# Patient Record
Sex: FEMALE | Race: WHITE | HISPANIC OR LATINO | Employment: UNEMPLOYED | URBAN - METROPOLITAN AREA
[De-identification: names, ages, dates, MRNs, and addresses within clinical notes are randomized per-mention and may not be internally consistent; named-entity substitution may affect disease eponyms.]

---

## 2018-10-29 ENCOUNTER — NON-PROVIDER VISIT (OUTPATIENT)
Dept: OBGYN | Facility: CLINIC | Age: 28
End: 2018-10-29
Payer: MEDICAID

## 2018-10-29 DIAGNOSIS — Z32.01 PREGNANCY EXAMINATION OR TEST, POSITIVE RESULT: ICD-10-CM

## 2018-10-29 LAB
INT CON NEG: NEGATIVE
INT CON POS: POSITIVE
POC URINE PREGNANCY TEST: POSITIVE

## 2018-10-29 PROCEDURE — 81025 URINE PREGNANCY TEST: CPT | Performed by: OBSTETRICS & GYNECOLOGY

## 2018-12-19 ENCOUNTER — INITIAL PRENATAL (OUTPATIENT)
Dept: OBGYN | Facility: CLINIC | Age: 28
End: 2018-12-19
Payer: MEDICAID

## 2018-12-19 ENCOUNTER — HOSPITAL ENCOUNTER (OUTPATIENT)
Facility: MEDICAL CENTER | Age: 28
End: 2018-12-19
Attending: NURSE PRACTITIONER
Payer: COMMERCIAL

## 2018-12-19 VITALS — WEIGHT: 164 LBS | DIASTOLIC BLOOD PRESSURE: 66 MMHG | SYSTOLIC BLOOD PRESSURE: 108 MMHG | BODY MASS INDEX: 30 KG/M2

## 2018-12-19 DIAGNOSIS — O09.299 HISTORY OF MACROSOMIA IN INFANT IN PRIOR PREGNANCY, CURRENTLY PREGNANT: ICD-10-CM

## 2018-12-19 DIAGNOSIS — Z34.80 SUPERVISION OF OTHER NORMAL PREGNANCY, ANTEPARTUM: ICD-10-CM

## 2018-12-19 DIAGNOSIS — Z98.891 HISTORY OF CESAREAN DELIVERY: ICD-10-CM

## 2018-12-19 DIAGNOSIS — Z34.80 SUPERVISION OF OTHER NORMAL PREGNANCY, ANTEPARTUM: Primary | ICD-10-CM

## 2018-12-19 LAB
APPEARANCE UR: CLEAR
BILIRUB UR STRIP-MCNC: NORMAL MG/DL
COLOR UR AUTO: YELLOW
GLUCOSE UR STRIP.AUTO-MCNC: NEGATIVE MG/DL
KETONES UR STRIP.AUTO-MCNC: NEGATIVE MG/DL
LEUKOCYTE ESTERASE UR QL STRIP.AUTO: NORMAL
NITRITE UR QL STRIP.AUTO: NEGATIVE
PH UR STRIP.AUTO: 7.5 [PH] (ref 5–8)
PROT UR QL STRIP: NORMAL MG/DL
RBC UR QL AUTO: NEGATIVE
SP GR UR STRIP.AUTO: 1.02
UROBILINOGEN UR STRIP-MCNC: NORMAL MG/DL

## 2018-12-19 PROCEDURE — 81002 URINALYSIS NONAUTO W/O SCOPE: CPT | Performed by: NURSE PRACTITIONER

## 2018-12-19 PROCEDURE — 59402 PR NEW OB HIGH RISK: CPT | Performed by: NURSE PRACTITIONER

## 2018-12-19 NOTE — LETTER
Cystic Fibrosis Carrier Testing  Sue Baig    The following information is about a blood test that can be done to determine if you and/or your partner carry the gene for cystic fibrosis.    WHAT IS CYSTIC FIBROSIS?  · Cystic fibrosis (CF) is an inherited disease that affects more than 25,000 American children and young adults.  · Symptoms of CF vary but include lung congestion, pneumonia, diarrhea and poor growth.  Most people with CF have severe medical problems and some die at a young age.  Others have so few symptoms they are unaware they have CF.  · CF does not affect intelligence.  · Although there is no cure for CF at this time, scientists are making progress in improving treatment and in searching for a cure.  In the past many people with CF  at a very young age.  Today, many are living into their 20’s and 30’s.    IS THERE A CHANCE MY BABY COULD HAVE CYSTIC FIBROSIS?  · You can have a child with CF even if there is no history in your family (see chart below).  · CF testing can help determine if you are a carrier and at risk to have a child with CF.  Note: if both parents are carriers, there is a 1 in 4 (25%) chance with each pregnancy that they will have a child with CF.  · Carriers have one normal CF gene and one altered CF gene.  · People with CF have two altered CF genes.  · Most people have two normal copies of the CF gene.    Approximate risk that a couple with no family history of cystic fibrosis will have a child with cystic fibrosis:    Ethnic background / Risk     couple:  1 in 2,500   couple:  1 in 15,000            couple:  1 in 8,000     American couple:  1 in 32,000     WHAT TESTING IS AVAILABLE?  · There is a blood test that can be done to find out if you or your partner is a carrier.  · It is important to understand that CF carrier testing does not detect all CF carriers.  · If the test shows that you are both CF carriers, you unborn  baby can be tested to find out if the baby has CF.    HOW MUCH DOES IT COST TO HAVE CYSTIC FIBROSIS CARRIER TESTING?  · Cost and insurance coverage for CF carrier testing vary depending upon the laboratory used and your insurance policy.  · The average cost for CF carrier testing is $300 per person.  · Your genetic counselor can provide you with more information about cystic fibrosis carrier testing.    _____  Yes, I am interested in discussing carrier testing with a genetic counselor.    _____  No, I am not interested in CF carrier testing or in receiving more information about CF carrier testing.      Client signature: ________________________________________  12/19/2018

## 2018-12-19 NOTE — PROGRESS NOTES
Pt. Here for NOB visit today.  #  First prenatal care  Pt. states  Pharmacy verified.   Chaperone offered and provided  ? Flu vaccine  ? AFP

## 2018-12-19 NOTE — PROGRESS NOTES
S:  Sue Baig is a 28 y.o.  who presents for her new OB exam.  She is 17w6d with and SUSAN of Estimated Date of Delivery: 19 by sure LMP. She has no complaints.  She is currently not working outside the home. No heavy lifting or chemical exposure. No ER visits or previous care in this pregnancy. She has had one VAVD and one c/s for FTP with macrosomia.     desires AFP.  declines CF.  Denies VB, LOF, or cramping.  Denies dysuria, vaginal DC. Reports feeling fetal movement.     Pt is  and lives with FOB.  Pregnancy is desired.      History reviewed. No pertinent past medical history.  History reviewed. No pertinent family history.  Social History     Social History   • Marital status: Single     Spouse name: N/A   • Number of children: N/A   • Years of education: N/A     Occupational History   • Not on file.     Social History Main Topics   • Smoking status: Never Smoker   • Smokeless tobacco: Never Used   • Alcohol use No   • Drug use: No   • Sexual activity: No      Comment: None     Other Topics Concern   • Not on file     Social History Narrative   • No narrative on file     OB History    Para Term  AB Living   4 2 2   1 2   SAB TAB Ectopic Molar Multiple Live Births   1         2      # Outcome Date GA Lbr Nacho/2nd Weight Sex Delivery Anes PTL Lv   4 Current            3 Term 12/05/15 39w5d  4.111 kg (9 lb 1 oz) M CS-Unspec Spinal N ISAIAH   2 SAB  10w1d    SAB         Complications: Molar pregnancy      Birth Comments: Molar pregnancy with D&C   1 Term 07 39w0d  3.572 kg (7 lb 14 oz) F Vag-Vacuum EPI N ISAIAH      Birth Comments: Pt. states no complications.           History of HSV I or II in self or partner: no  History of Thyroid problems: no    O:    Vitals:    18 1338   BP: 108/66   Weight: 74.4 kg (164 lb)      Normal prenatal physical on flow sheet prenatal physical section  Wet mount: none      A:   1.  IUP @ 17w6d per lmp        2.  S=D         3.  See problem list below        4.  History of macrosomia       Patient Active Problem List    Diagnosis Date Noted   • History of  delivery 2018   • History of macrosomia in infant in prior pregnancy, currently pregnant 2018   • History of molar pregnancy in first trimester, antepartum 2015         P:  1.  GC/CT & pap done        2.  Prenatal labs ordered - lab slip given including early glucose and AFP        3.  Discussed PNV, diet, avoidances and adequate water intake        4.  NOB packet given        5.  Return to office in 4 wks        6.  Complete OB US in 2 wks        7.  Patient desires , consent signed. Op report on file.  policy of the clinic reviewed with patient.     No orders of the defined types were placed in this encounter.

## 2018-12-19 NOTE — PROGRESS NOTES
NOB today  LMP: 08/16/2018  Patient is taking PNV  Last pap: 3 years ago, WNL no hx of abnormal paps per patient   Chaperone offered and provided.  Phone # [phone]  Pharmacy confirmed  C/o: None   Flu vaccine offered and declined.

## 2018-12-20 LAB
C TRACH DNA GENITAL QL NAA+PROBE: NEGATIVE
CYTOLOGY REG CYTOL: NORMAL
N GONORRHOEA DNA GENITAL QL NAA+PROBE: NEGATIVE
SPECIMEN SOURCE: NORMAL

## 2018-12-21 ENCOUNTER — APPOINTMENT (OUTPATIENT)
Dept: OBGYN | Facility: CLINIC | Age: 28
End: 2018-12-21

## 2019-01-09 ENCOUNTER — APPOINTMENT (OUTPATIENT)
Dept: RADIOLOGY | Facility: IMAGING CENTER | Age: 29
End: 2019-01-09
Attending: NURSE PRACTITIONER

## 2019-01-09 ENCOUNTER — DATING (OUTPATIENT)
Dept: OBGYN | Facility: CLINIC | Age: 29
End: 2019-01-09

## 2019-01-09 DIAGNOSIS — Z34.80 SUPERVISION OF OTHER NORMAL PREGNANCY, ANTEPARTUM: ICD-10-CM

## 2019-01-09 PROCEDURE — 76805 OB US >/= 14 WKS SNGL FETUS: CPT | Performed by: OBSTETRICS & GYNECOLOGY

## 2019-01-16 ENCOUNTER — ROUTINE PRENATAL (OUTPATIENT)
Dept: OBGYN | Facility: CLINIC | Age: 29
End: 2019-01-16

## 2019-01-16 VITALS — SYSTOLIC BLOOD PRESSURE: 110 MMHG | DIASTOLIC BLOOD PRESSURE: 54 MMHG | WEIGHT: 167 LBS | BODY MASS INDEX: 30.54 KG/M2

## 2019-01-16 DIAGNOSIS — Z3A.21 21 WEEKS GESTATION OF PREGNANCY: ICD-10-CM

## 2019-01-16 PROCEDURE — 90040 PR PRENATAL FOLLOW UP: CPT | Performed by: NURSE PRACTITIONER

## 2019-01-16 PROCEDURE — 90686 IIV4 VACC NO PRSV 0.5 ML IM: CPT | Performed by: NURSE PRACTITIONER

## 2019-01-16 PROCEDURE — 90471 IMMUNIZATION ADMIN: CPT | Performed by: NURSE PRACTITIONER

## 2019-01-16 NOTE — PROGRESS NOTES
Pt here today for OB follow up  Reports +FM  WT: 167 lb  BP: 110/54  Had US on 01/09/2019  Pt states no complaints concerns today  PNP, AFP and early 1 hr gtt not done yet. Pt states she will get blood work done tomorrow. Lab slips reprinted for patient and instructions given.  Desires the influenza vaccine  Good # [phone]    Influenza vaccine given. Left Deltoid. VIS given and screening check list reviewed with pt.  Influenza vaccine verified by Elizabet [place] ([place])

## 2019-01-16 NOTE — PROGRESS NOTES
SUBJECTIVE:  Pt is a 28 y.o.   at 21w6d  gestation. Presents today for follow-up prenatal care. Reports no issues at this time.  Reports fetal movement. Denies cramping/contractions, bleeding or leaking of fluid. Denies dysuria, headaches, N/V, or other issues at this time. Generally feels well today.     OBJECTIVE:  - See prenatal vitals flow  -   Vitals:    19 1440   BP: 110/54   Weight: 75.8 kg (167 lb)                 ASSESSMENT:   - IUP at 21w6d    - S=D   -   Patient Active Problem List    Diagnosis Date Noted   • History of  delivery 2018   • History of macrosomia in infant in prior pregnancy, currently pregnant 2018   • History of molar pregnancy in first trimester, antepartum 2015         PLAN:  - S/sx pregnancy and labor warning signs vs general discomforts discussed  - Fetal movements and kick counts reviewed   - Adequate hydration reinforced  - Nutrition/exercise/vitamin education; continued PNV  - Encouraged tour of LnD/childbirth education classes: contact info provided   - Reports will get labs done tomorrow   - S/p Flu vacc today  - Anticipatory guidance given  - RTC in 4 weeks for follow-up prenatal care

## 2019-02-06 ENCOUNTER — HOSPITAL ENCOUNTER (OUTPATIENT)
Dept: LAB | Facility: MEDICAL CENTER | Age: 29
End: 2019-02-06
Attending: NURSE PRACTITIONER
Payer: MEDICAID

## 2019-02-06 DIAGNOSIS — Z34.80 SUPERVISION OF OTHER NORMAL PREGNANCY, ANTEPARTUM: ICD-10-CM

## 2019-02-06 DIAGNOSIS — O09.299 HISTORY OF MACROSOMIA IN INFANT IN PRIOR PREGNANCY, CURRENTLY PREGNANT: ICD-10-CM

## 2019-02-06 LAB
ABO GROUP BLD: NORMAL
BACTERIA #/AREA URNS HPF: ABNORMAL /HPF
BLD GP AB SCN SERPL QL: NORMAL
EPI CELLS #/AREA URNS HPF: ABNORMAL /HPF
GLUCOSE 1H P 50 G GLC PO SERPL-MCNC: 175 MG/DL (ref 70–139)
HYALINE CASTS #/AREA URNS LPF: ABNORMAL /LPF
RBC # URNS HPF: ABNORMAL /HPF
RH BLD: NORMAL
WBC #/AREA URNS HPF: ABNORMAL /HPF

## 2019-02-06 PROCEDURE — 86901 BLOOD TYPING SEROLOGIC RH(D): CPT

## 2019-02-06 PROCEDURE — 81511 FTL CGEN ABNOR FOUR ANAL: CPT

## 2019-02-06 PROCEDURE — 81001 URINALYSIS AUTO W/SCOPE: CPT

## 2019-02-06 PROCEDURE — 86762 RUBELLA ANTIBODY: CPT

## 2019-02-06 PROCEDURE — 87389 HIV-1 AG W/HIV-1&-2 AB AG IA: CPT

## 2019-02-06 PROCEDURE — 85025 COMPLETE CBC W/AUTO DIFF WBC: CPT

## 2019-02-06 PROCEDURE — 86850 RBC ANTIBODY SCREEN: CPT

## 2019-02-06 PROCEDURE — 36415 COLL VENOUS BLD VENIPUNCTURE: CPT

## 2019-02-06 PROCEDURE — 86780 TREPONEMA PALLIDUM: CPT

## 2019-02-06 PROCEDURE — 86900 BLOOD TYPING SEROLOGIC ABO: CPT

## 2019-02-06 PROCEDURE — 87340 HEPATITIS B SURFACE AG IA: CPT

## 2019-02-06 PROCEDURE — 82950 GLUCOSE TEST: CPT

## 2019-02-06 PROCEDURE — 87086 URINE CULTURE/COLONY COUNT: CPT

## 2019-02-07 DIAGNOSIS — Z3A.25 25 WEEKS GESTATION OF PREGNANCY: ICD-10-CM

## 2019-02-07 LAB
APPEARANCE UR: ABNORMAL
BASOPHILS # BLD AUTO: 0.6 % (ref 0–1.8)
BASOPHILS # BLD: 0.05 K/UL (ref 0–0.12)
BILIRUB UR QL STRIP.AUTO: NEGATIVE
COLOR UR: YELLOW
EOSINOPHIL # BLD AUTO: 0.02 K/UL (ref 0–0.51)
EOSINOPHIL NFR BLD: 0.2 % (ref 0–6.9)
ERYTHROCYTE [DISTWIDTH] IN BLOOD BY AUTOMATED COUNT: 46.2 FL (ref 35.9–50)
GLUCOSE UR STRIP.AUTO-MCNC: NEGATIVE MG/DL
HBV SURFACE AG SER QL: NEGATIVE
HCT VFR BLD AUTO: 34.3 % (ref 37–47)
HGB BLD-MCNC: 10.9 G/DL (ref 12–16)
HIV 1+2 AB+HIV1 P24 AG SERPL QL IA: NON REACTIVE
IMM GRANULOCYTES # BLD AUTO: 0.07 K/UL (ref 0–0.11)
IMM GRANULOCYTES NFR BLD AUTO: 0.8 % (ref 0–0.9)
KETONES UR STRIP.AUTO-MCNC: NEGATIVE MG/DL
LEUKOCYTE ESTERASE UR QL STRIP.AUTO: ABNORMAL
LYMPHOCYTES # BLD AUTO: 1.73 K/UL (ref 1–4.8)
LYMPHOCYTES NFR BLD: 19.3 % (ref 22–41)
MCH RBC QN AUTO: 27.8 PG (ref 27–33)
MCHC RBC AUTO-ENTMCNC: 31.8 G/DL (ref 33.6–35)
MCV RBC AUTO: 87.5 FL (ref 81.4–97.8)
MICRO URNS: ABNORMAL
MONOCYTES # BLD AUTO: 0.29 K/UL (ref 0–0.85)
MONOCYTES NFR BLD AUTO: 3.2 % (ref 0–13.4)
NEUTROPHILS # BLD AUTO: 6.8 K/UL (ref 2–7.15)
NEUTROPHILS NFR BLD: 75.9 % (ref 44–72)
NITRITE UR QL STRIP.AUTO: NEGATIVE
NRBC # BLD AUTO: 0 K/UL
NRBC BLD-RTO: 0 /100 WBC
PH UR STRIP.AUTO: 7.5 [PH]
PLATELET # BLD AUTO: 267 K/UL (ref 164–446)
PMV BLD AUTO: 11.3 FL (ref 9–12.9)
PROT UR QL STRIP: NEGATIVE MG/DL
RBC # BLD AUTO: 3.92 M/UL (ref 4.2–5.4)
RBC UR QL AUTO: NEGATIVE
RUBV AB SER QL: 267.1 IU/ML
SP GR UR STRIP.AUTO: 1.02
TREPONEMA PALLIDUM IGG+IGM AB [PRESENCE] IN SERUM OR PLASMA BY IMMUNOASSAY: NON REACTIVE
UROBILINOGEN UR STRIP.AUTO-MCNC: 0.2 MG/DL
WBC # BLD AUTO: 9 K/UL (ref 4.8–10.8)

## 2019-02-08 ENCOUNTER — TELEPHONE (OUTPATIENT)
Dept: OBGYN | Facility: CLINIC | Age: 29
End: 2019-02-08

## 2019-02-08 LAB
BACTERIA UR CULT: NORMAL
SIGNIFICANT IND 70042: NORMAL
SITE SITE: NORMAL
SOURCE SOURCE: NORMAL

## 2019-02-08 NOTE — TELEPHONE ENCOUNTER
----- Message from SABINE Garcia sent at 2/7/2019  8:05 AM PST -----  Pt needs to do three hour glucose test asap.    Pt notified of abnormal 1hr gtt and need to do 3hr gtt this time. Pt instructed to fast 8-10 hrs   pt only allow to drink plain water during fasting time. Pt will call lab to schedule an appt. Advised to bring a snack for after the test is done. Pt notified will be staying in the labs for the 3hr. Pt agreed to do it on 2/9/19. Pt verbalized understanding.

## 2019-02-11 LAB
# FETUSES US: NORMAL
AFP MOM SERPL: 1
AFP SERPL-MCNC: 119 NG/ML
AGE - REPORTED: 27.9 YR
CURRENT SMOKER: NO
FAMILY MEMBER DISEASES HX: NO
GA METHOD: NORMAL
GA: NORMAL WK
HCG MOM SERPL: 0.45
HCG SERPL-ACNC: 7394 IU/L
HX OF HEREDITARY DISORDERS: NO
IDDM PATIENT QL: NO
INHIBIN A MOM SERPL: 0.66
INHIBIN A SERPL-MCNC: 203 PG/ML
INTEGRATED SCN PATIENT-IMP: NORMAL
PATHOLOGY STUDY: NORMAL
SPECIMEN DRAWN SERPL: NORMAL
U ESTRIOL MOM SERPL: 0.76
U ESTRIOL SERPL-MCNC: 2.76 NG/ML

## 2019-02-14 ENCOUNTER — ROUTINE PRENATAL (OUTPATIENT)
Dept: OBGYN | Facility: CLINIC | Age: 29
End: 2019-02-14
Payer: MEDICAID

## 2019-02-14 VITALS — SYSTOLIC BLOOD PRESSURE: 98 MMHG | DIASTOLIC BLOOD PRESSURE: 60 MMHG | BODY MASS INDEX: 31.09 KG/M2 | WEIGHT: 170 LBS

## 2019-02-14 DIAGNOSIS — Z34.82 ENCOUNTER FOR SUPERVISION OF OTHER NORMAL PREGNANCY, SECOND TRIMESTER: Primary | ICD-10-CM

## 2019-02-14 PROCEDURE — 90040 PR PRENATAL FOLLOW UP: CPT | Performed by: NURSE PRACTITIONER

## 2019-02-14 NOTE — PROGRESS NOTES
OB f/u. + fetal movement.  No VB, LOF or UC's.  Wt:170lb       BP:98/60  Good phone # [phone]  Preferred pharmacy confirmed.  3hrs GTT not done yet planing to go tomorrow.  C/o: leakage on her breast

## 2019-02-14 NOTE — PROGRESS NOTES
S) Pt is a 28 y.o.   at 26w0d  gestation. Routine prenatal care today. Pt having a little bit of milky colored discharge from her nipples occasionally.  Discussed her elevated 1 hr GTT, she is going to go tomorrow for 3 hr test fasting.   Fetal movement Normal  Cramping no  VB no  LOF no   Denies dysuria. Generally feels well today. Good self-care activities identified. Denies headaches, swelling, visual changes, or epigastric pain .     O) Last menstrual period 2018, currently breastfeeding.        Labs:       PNL: WNL       GCT: 175, needs 3 hr       AFP: normal       GBS: N/A       Pertinent ultrasound - 19 RJ 17.70, survey WNL, c/w previous dating           A) IUP at 26w0d       S=D         Patient Active Problem List    Diagnosis Date Noted   • History of  delivery 2018   • History of macrosomia in infant in prior pregnancy, currently pregnant 2018   • History of molar pregnancy in first trimester, antepartum 2015          SVE: deferred         TDAP: no       FLU: yes        BTL: no       : no       C/S Consent: no       IOL or C/S scheduled: no       LAST PAP: 18 negative         P) s/s ptl vs general discomforts. Fetal movements reviewed. General ed and anticipatory guidance. Nutrition/exercise/vitamin. Plans breast Plans pp contraception- Condoms    To do 3 hr GTT tomorrow.    RTC 2 weeks or PRN

## 2019-02-28 ENCOUNTER — HOSPITAL ENCOUNTER (OUTPATIENT)
Dept: LAB | Facility: MEDICAL CENTER | Age: 29
End: 2019-02-28
Attending: NURSE PRACTITIONER
Payer: MEDICAID

## 2019-02-28 DIAGNOSIS — Z3A.25 25 WEEKS GESTATION OF PREGNANCY: ICD-10-CM

## 2019-02-28 LAB
GLUCOSE 1H P CHAL SERPL-MCNC: 125 MG/DL (ref 65–180)
GLUCOSE 2H P CHAL SERPL-MCNC: 76 MG/DL (ref 65–155)
GLUCOSE 3H P CHAL SERPL-MCNC: 75 MG/DL (ref 65–140)
GLUCOSE BS SERPL-MCNC: 84 MG/DL (ref 65–95)

## 2019-02-28 PROCEDURE — 82951 GLUCOSE TOLERANCE TEST (GTT): CPT

## 2019-02-28 PROCEDURE — 82952 GTT-ADDED SAMPLES: CPT

## 2019-02-28 PROCEDURE — 36415 COLL VENOUS BLD VENIPUNCTURE: CPT

## 2019-03-01 ENCOUNTER — ROUTINE PRENATAL (OUTPATIENT)
Dept: OBGYN | Facility: CLINIC | Age: 29
End: 2019-03-01
Payer: MEDICAID

## 2019-03-01 VITALS — BODY MASS INDEX: 30.54 KG/M2 | DIASTOLIC BLOOD PRESSURE: 60 MMHG | SYSTOLIC BLOOD PRESSURE: 104 MMHG | WEIGHT: 167 LBS

## 2019-03-01 DIAGNOSIS — R12 HEARTBURN IN PREGNANCY IN THIRD TRIMESTER: ICD-10-CM

## 2019-03-01 DIAGNOSIS — O09.93 SUPERVISION OF HIGH RISK PREGNANCY IN THIRD TRIMESTER: ICD-10-CM

## 2019-03-01 DIAGNOSIS — O26.893 HEARTBURN IN PREGNANCY IN THIRD TRIMESTER: ICD-10-CM

## 2019-03-01 PROCEDURE — 90715 TDAP VACCINE 7 YRS/> IM: CPT | Performed by: PHYSICIAN ASSISTANT

## 2019-03-01 PROCEDURE — 90471 IMMUNIZATION ADMIN: CPT | Performed by: PHYSICIAN ASSISTANT

## 2019-03-01 PROCEDURE — 90040 PR PRENATAL FOLLOW UP: CPT | Performed by: PHYSICIAN ASSISTANT

## 2019-03-01 NOTE — LETTER
"Count Your Baby's Movements  Another step to a healthy delivery    Sesar Hong             Dept: [phone]    How Many Weeks Pregnant 28w1d    Date to Begin Counting: 3/1/19              How to use this chart    One way for your physician to keep track of your baby's health is by knowing how often the baby moves (or \"kicks\") in your womb.  You can help your physician to do this by using this chart every day.    Every day, you should see how many hours it takes for your baby to move 10 times.  Start in the morning, as soon as you get up.    · First, write down the time your baby moves until you get to 10.  · Check off one box every time your baby moves until you get to 10.  · Write down the time you finished counting in the last column.  · Total how long it took to count up all 10 movements.  · Finally, fill in the box that shows how long this took.  After counting 10 movements, you no longer have to count any more that day.  The next morning, just start counting again as soon as you get up.    What should you call a \"movement\"?  It is hard to say, because it will feel different from one mother to another and from one pregnancy to the next.  The important thing is that you count the movements the same way throughout your pregnancy.  If you have more questions, you should ask your physician.    Count carefully every day!  SAMPLE:  Week 28    How many hours did it take to feel 10 movements?       Start  Time     1     2     3     4     5     6     7     8     9     10   Finish Time   Mon 8:20 ·  ·  ·  ·  ·  ·  ·  ·  ·  ·  11:40   Tue Wed Thu Fri               Sat               Sun                 IMPORTANT: You should contact your physician if it takes more than two hours for you to feel 10 movements.  Each morning, write down the time and start to count the movements of your baby.  Keep track by checking off one box every time you feel one movement.  When you have felt " "10 \"kicks\", write down the time you finished counting in the last column.  Then fill in the   box (over the check norris) for the number of hours it took.  Be sure to read the complete instructions on the previous page.            "

## 2019-03-02 NOTE — PROGRESS NOTES
Pt has no complaints with cramping, UCs, Vb, LOF. +FM - FKC sheet given today. TDaP given today. 1hr , but 3hr GTT wnl - pt notified of results. Pt considering BTL, but unsure, so will let us knwo next visit. Wants TOLAC. PTL precautions reviewed. Pt has had worsening heartburn, info given, pt to try Zantac prn. RTC 2-3 wk or sooner prn.

## 2019-03-02 NOTE — NON-PROVIDER
TDap given to patient. R  Deltoid. Screening checklist reviewed with patient. VIS given. Verified by TC

## 2019-03-02 NOTE — PROGRESS NOTES
OB f/u. + fetal movement.  No VB, LOF or UC's.  Wt: 167lb      BP:104/60  Good phone # [phone]  Preferred pharmacy confirmed.  Kick count sheet given today.  TDap today  BTL not sure will let us know next visit

## 2019-03-22 ENCOUNTER — ROUTINE PRENATAL (OUTPATIENT)
Dept: OBGYN | Facility: CLINIC | Age: 29
End: 2019-03-22
Payer: MEDICAID

## 2019-03-22 VITALS — BODY MASS INDEX: 31.28 KG/M2 | DIASTOLIC BLOOD PRESSURE: 64 MMHG | SYSTOLIC BLOOD PRESSURE: 120 MMHG | WEIGHT: 171 LBS

## 2019-03-22 DIAGNOSIS — O09.93 SUPERVISION OF HIGH RISK PREGNANCY IN THIRD TRIMESTER: Primary | ICD-10-CM

## 2019-03-22 PROCEDURE — 90040 PR PRENATAL FOLLOW UP: CPT | Performed by: NURSE PRACTITIONER

## 2019-03-22 NOTE — PROGRESS NOTES
OB F/U  Denies VB, LOF, or UC  +FM  Phone#: [phone]  Pharmacy Confirmed.  C/O: possible dc or LOF, patient unsure   3hr WNL  BTL, declined

## 2019-03-22 NOTE — PROGRESS NOTES
S) Pt is a 28 y.o.   at 31w1d  gestation. Routine prenatal care today. Pt concerned as having some increased moisture in her underwear in the morning and she noticed increased discharge when she cleans.  Denies constant leaking or gushes when she is coughing, standing sitting.  She has not had to wear a pad.  Reassurance provided increased discharge is normal.    Fetal movement Normal  Cramping no  VB no  LOF no   Denies dysuria. Generally feels well today. Good self-care activities identified. Denies headaches, swelling, visual changes, or epigastric pain .     O) Blood pressure 120/64, weight 77.6 kg (171 lb), last menstrual period 2018, currently breastfeeding.        Labs:       PNL: WNL       GCT: elevated 1 hr, normal 3 hr       AFP: normal       GBS: N/A       Pertinent ultrasound -19 RJ 17.70, survey WNL, c/w previous dating            A) IUP at 31w1d       S=D         Patient Active Problem List    Diagnosis Date Noted   • Supervision of high risk pregnancy in third trimester 2019   • Heartburn in pregnancy in third trimester 2019   • History of C/S x 1 - desires TOLAC, considering BTL 2018   • History of macrosomia in infant in prior pregnancy, currently pregnant 2018   • History of molar pregnancy in first trimester, antepartum 2015          SVE: deferred         TDAP: no       FLU: yes        BTL: no       : no       C/S Consent: no       IOL or C/S scheduled: no       LAST PAP: 18 negative         P) s/s ptl vs general discomforts. Fetal movements reviewed. General ed and anticipatory guidance. Nutrition/exercise/vitamin. Plans breast Plans pp contraception- condoms  Plan for IOL at 39 weeks d/t hx macrosomia  RTC 2 weeks or PRN

## 2019-04-05 ENCOUNTER — ROUTINE PRENATAL (OUTPATIENT)
Dept: OBGYN | Facility: CLINIC | Age: 29
End: 2019-04-05
Payer: MEDICAID

## 2019-04-05 VITALS — SYSTOLIC BLOOD PRESSURE: 108 MMHG | WEIGHT: 170 LBS | BODY MASS INDEX: 31.09 KG/M2 | DIASTOLIC BLOOD PRESSURE: 64 MMHG

## 2019-04-05 DIAGNOSIS — Z98.891 HISTORY OF CESAREAN DELIVERY: ICD-10-CM

## 2019-04-05 DIAGNOSIS — O09.93 SUPERVISION OF HIGH RISK PREGNANCY IN THIRD TRIMESTER: ICD-10-CM

## 2019-04-05 DIAGNOSIS — O26.893 HEARTBURN IN PREGNANCY IN THIRD TRIMESTER: ICD-10-CM

## 2019-04-05 DIAGNOSIS — R12 HEARTBURN IN PREGNANCY IN THIRD TRIMESTER: ICD-10-CM

## 2019-04-05 DIAGNOSIS — O09.299 HISTORY OF MACROSOMIA IN INFANT IN PRIOR PREGNANCY, CURRENTLY PREGNANT: ICD-10-CM

## 2019-04-05 PROCEDURE — 90040 PR PRENATAL FOLLOW UP: CPT | Performed by: OBSTETRICS & GYNECOLOGY

## 2019-04-05 NOTE — PROGRESS NOTES
S: Pt presents for routine OB follow up.  Patient is doing well without any complaints.  Reports good fetal movements.  No contractions, vaginal bleeding, or leakage of fluid.  Patient has had a vaginal delivery followed by  and desires trial of labor after  in this pregnancy.  Patient was counseled on risks.  Patient understands that if she does not go into labor by her due date that she will have a scheduled repeat  and patient agrees.  We discussed GBS culture at follow-up visit.  Questions answered.    O: /64   Wt 77.1 kg (170 lb)   LMP 2018   BMI 31.09 kg/m²   Patients' weight gain, fluid intake and exercise level discussed.  Vitals, fundal height , fetal position, and FHR reviewed on flowsheet    Lab:No results found for this or any previous visit (from the past 336 hour(s)).    A/P:  28 y.o.  at 33w1d presents for routine obstetric follow-up.  Size equals dates  Encounter Diagnoses   Name Primary?   • Supervision of high risk pregnancy in third trimester    • Heartburn in pregnancy in third trimester    • History of macrosomia in infant in prior pregnancy, currently pregnant    • History of C/S x 1 - desires TOLAC, considering BTL        1.  Continue prenatal vitamins.  2.  Fetal kick counts daily.  3.  Exercise at least 30 minutes daily.  4.  Drink at least 2L of water daily  5.  Pregnancy and  labor precautions were discussed  6.  Follow-up in 2 weeks.  7.  GBS follow-up visit  8.  Precautions and plan of care were reviewed

## 2019-04-05 NOTE — PROGRESS NOTES
Pt here today for OB follow up  Pt states no complaints   Reports +FM  Good # [phone]  Pharmacy Confirmed.

## 2019-04-19 ENCOUNTER — ROUTINE PRENATAL (OUTPATIENT)
Dept: OBGYN | Facility: CLINIC | Age: 29
End: 2019-04-19
Payer: MEDICAID

## 2019-04-19 ENCOUNTER — HOSPITAL ENCOUNTER (OUTPATIENT)
Facility: MEDICAL CENTER | Age: 29
End: 2019-04-19
Attending: NURSE PRACTITIONER
Payer: MEDICAID

## 2019-04-19 VITALS — SYSTOLIC BLOOD PRESSURE: 100 MMHG | WEIGHT: 172 LBS | DIASTOLIC BLOOD PRESSURE: 62 MMHG | BODY MASS INDEX: 31.46 KG/M2

## 2019-04-19 DIAGNOSIS — O09.93 ENCOUNTER FOR SUPERVISION OF HIGH RISK PREGNANCY IN THIRD TRIMESTER, ANTEPARTUM: ICD-10-CM

## 2019-04-19 DIAGNOSIS — O09.93 ENCOUNTER FOR SUPERVISION OF HIGH RISK PREGNANCY IN THIRD TRIMESTER, ANTEPARTUM: Primary | ICD-10-CM

## 2019-04-19 PROCEDURE — 87150 DNA/RNA AMPLIFIED PROBE: CPT

## 2019-04-19 PROCEDURE — 87081 CULTURE SCREEN ONLY: CPT

## 2019-04-19 PROCEDURE — 90040 PR PRENATAL FOLLOW UP: CPT | Performed by: NURSE PRACTITIONER

## 2019-04-19 NOTE — PROGRESS NOTES
S:  Pt is  at 35w1d here for routine OB follow up.  Reports occas CTX.  Reports good FM.  Denies VB, LOF, RUCs, or vaginal DC.     O:  Please see above vitals.        FHTs: 138        Fundal ht: 35 cm.        S=D        Fetal position: vertex.    A:  IUP at 35w1d  Patient Active Problem List    Diagnosis Date Noted   • Supervision of high risk pregnancy in third trimester 2019   • Heartburn in pregnancy in third trimester 2019   • History of C/S x 1 - desires TOLAC, considering BTL 2018   • History of macrosomia in infant in prior pregnancy, currently pregnant 2018   • Hx of molar pregnancy, antepartum 2015       P:  1.  GBS obtained.          2.  Labor precautions given.  Instructions given on where to go.  Pt receptive to              education.          3.  Questions answered.          4.  D/w pt policies about GBS.        5.  Continue FKCs.          6.  Encouraged adequate water intake        7.  F/u 1 wk.

## 2019-04-19 NOTE — PROGRESS NOTES
Pt here today for OB follow up  Pt states having contractions off and on   Reports +FM  Good # [phone]  Pharmacy Confirmed.  Chaperone offered and not indicated.   GBS today.

## 2019-04-22 LAB — GP B STREP DNA SPEC QL NAA+PROBE: NEGATIVE

## 2019-04-26 ENCOUNTER — ROUTINE PRENATAL (OUTPATIENT)
Dept: OBGYN | Facility: CLINIC | Age: 29
End: 2019-04-26
Payer: MEDICAID

## 2019-04-26 VITALS — SYSTOLIC BLOOD PRESSURE: 92 MMHG | DIASTOLIC BLOOD PRESSURE: 60 MMHG | WEIGHT: 170 LBS | BODY MASS INDEX: 31.09 KG/M2

## 2019-04-26 DIAGNOSIS — O09.93 SUPERVISION OF HIGH RISK PREGNANCY IN THIRD TRIMESTER: Primary | ICD-10-CM

## 2019-04-26 PROCEDURE — 90040 PR PRENATAL FOLLOW UP: CPT | Performed by: NURSE PRACTITIONER

## 2019-04-26 NOTE — PROGRESS NOTES
Pt here today for OB follow up  Pt states no complaints  Reports +  Good # [phone]  Pharmacy Confirmed.  Chaperone offered and provided.

## 2019-04-26 NOTE — PROGRESS NOTES
S) Pt is a 28 y.o.   at 36w1d  gestation. Routine prenatal care today. Pt without concerns today.    Fetal movement Normal  Cramping no  VB no  LOF no   Denies dysuria. Generally feels well today. Good self-care activities identified. Denies headaches, swelling, visual changes, or epigastric pain .     O) There were no vitals taken for this visit.        Labs:       PNL: WNL       GCT: elvated 1 hr, normal 3hr       AFP: normal       GBS: N/A       Pertinent ultrasound -  19 RJ 17.70, survey WNL, c/w previous dating            A) IUP at 36w1d       S=D         Patient Active Problem List    Diagnosis Date Noted   • Supervision of high risk pregnancy in third trimester 2019   • Heartburn in pregnancy in third trimester 2019   • History of C/S x 1 - desires TOLAC, considering BTL 2018   • History of macrosomia in infant in prior pregnancy, currently pregnant 2018   • Hx of molar pregnancy, antepartum 2015          SVE: deferred         TDAP: no       FLU: yes        BTL: no       : no       C/S Consent: no       IOL or C/S scheduled: no       LAST PAP: 18 negative         P) s/s ptl vs general discomforts. Fetal movements reviewed. General ed and anticipatory guidance. Nutrition/exercise/vitamin. Plans breast Plans pp contraception- BTL  Continue PNV.   Discussed GBS neg  Encouraged walking   RTC 1 week or PRN

## 2019-05-02 ENCOUNTER — ROUTINE PRENATAL (OUTPATIENT)
Dept: OBGYN | Facility: CLINIC | Age: 29
End: 2019-05-02
Payer: MEDICAID

## 2019-05-02 VITALS — WEIGHT: 172 LBS | BODY MASS INDEX: 31.46 KG/M2 | DIASTOLIC BLOOD PRESSURE: 64 MMHG | SYSTOLIC BLOOD PRESSURE: 112 MMHG

## 2019-05-02 DIAGNOSIS — Z98.891 HISTORY OF CESAREAN DELIVERY: ICD-10-CM

## 2019-05-02 DIAGNOSIS — O09.93 SUPERVISION OF HIGH RISK PREGNANCY IN THIRD TRIMESTER: Primary | ICD-10-CM

## 2019-05-02 PROCEDURE — 90040 PR PRENATAL FOLLOW UP: CPT | Performed by: NURSE PRACTITIONER

## 2019-05-02 NOTE — PROGRESS NOTES
Pt here today for OB follow up  Negative GBS, pt aware  Reports +FM  WT: 172 lb  BP: 112/64  Pt states he hips have been  Hurting. States no other complaints.   Good # [phone]

## 2019-05-02 NOTE — PROGRESS NOTES
S) Pt is a 28 y.o.   at 37w0d  gestation. Routine prenatal care today. No complaints today. Discussed SVE at next visit. Possible membrane sweep. Labor precautions reviewed, all questions answered. GBS negative results discussed.    Fetal movement Normal  Cramping no  VB no  LOF no   Denies dysuria. Generally feels well today. Good self-care activities identified. Denies headaches, swelling, visual changes, or epigastric pain .     O) /64   Wt 78 kg (172 lb)         Labs:       PNL: WNL       GCT: 175, but 3 hour WNL        AFP: Normal       GBS: negative       Pertinent ultrasound -        19- Survey WNL, RJ 17.70cm, c/w prev dating    A) IUP at 37w0d       S=D         Patient Active Problem List    Diagnosis Date Noted   • Supervision of high risk pregnancy in third trimester 2019   • Heartburn in pregnancy in third trimester 2019   • History of C/S x 1 - desires TOLAC, considering BTL 2018   • History of macrosomia in infant in prior pregnancy, currently pregnant 2018   • Hx of molar pregnancy, antepartum 2015          SVE: deferred       Chaperone offered: n/a         TDAP: yes       FLU: yes        BTL: no       : yes       C/S Consent: no       IOL or C/S scheduled: no- referral placed today for repeat       LAST PAP: 18- Negative         P) s/s ptl vs general discomforts. Fetal movements reviewed. General ed and anticipatory guidance. Nutrition/exercise/vitamin. Plans breast Plans pp contraception- unsure  Continue PNV.

## 2019-05-09 ENCOUNTER — ROUTINE PRENATAL (OUTPATIENT)
Dept: OBGYN | Facility: CLINIC | Age: 29
End: 2019-05-09
Payer: MEDICAID

## 2019-05-09 VITALS — WEIGHT: 170 LBS | BODY MASS INDEX: 31.09 KG/M2 | DIASTOLIC BLOOD PRESSURE: 64 MMHG | SYSTOLIC BLOOD PRESSURE: 110 MMHG

## 2019-05-09 DIAGNOSIS — O09.93 SUPERVISION OF HIGH RISK PREGNANCY IN THIRD TRIMESTER: Primary | ICD-10-CM

## 2019-05-09 PROCEDURE — 90040 PR PRENATAL FOLLOW UP: CPT | Performed by: NURSE PRACTITIONER

## 2019-05-09 NOTE — PROGRESS NOTES
S) Pt is a 28 y.o.   at 38w0d  gestation. Routine prenatal care today. Pt without concerns today.  Really hoping to go into labour.    Fetal movement Normal  Cramping no  VB no  LOF no   Denies dysuria. Generally feels well today. Good self-care activities identified. Denies headaches, swelling, visual changes, or epigastric pain .     O) /64   Wt 77.1 kg (170 lb)         Labs:       PNL: WNL       GCT: elevated 1 hr, normal 3 hr       AFP: normal       GBS: negative       Pertinent ultrasound - 19 RJ 17.70, survey WNL, c/w previous dating            A) IUP at 38w0d       S=D         Patient Active Problem List    Diagnosis Date Noted   • Supervision of high risk pregnancy in third trimester 2019   • Heartburn in pregnancy in third trimester 2019   • History of C/S x 1 - desires TOLAC, considering BTL 2018   • History of macrosomia in infant in prior pregnancy, currently pregnant 2018   • Hx of molar pregnancy, antepartum 2015          SVE: closed/50/-3 external os 3cm         TDAP: yes       FLU: yes        BTL: yes       : yes       C/S Consent: yes       IOL or C/S scheduled: yes - Lois will call tomorrow with date       LAST PAP: 18 negative         P) Labour precautions discussed.   Fetal movements reviewed. General ed and anticipatory guidance. Nutrition/exercise/vitamin. Plans breast Plans pp contraception- unsure    Encouraged walking daily.  RTC 1 week or PRN

## 2019-05-09 NOTE — PROGRESS NOTES
Pt. Here for OB/FU. Reports Good FM.   Good # [phone]  Pt states having contractions that come and go.   Informed pt that Rachel our surgery scheduler will be calling her tomorrow 5/10 with C/S date.   Pharmacy verified.   Chaperone offered not needed.   GBS neg

## 2019-05-17 ENCOUNTER — ROUTINE PRENATAL (OUTPATIENT)
Dept: OBGYN | Facility: CLINIC | Age: 29
End: 2019-05-17
Payer: MEDICAID

## 2019-05-17 VITALS — DIASTOLIC BLOOD PRESSURE: 78 MMHG | SYSTOLIC BLOOD PRESSURE: 120 MMHG | BODY MASS INDEX: 32.19 KG/M2 | WEIGHT: 176 LBS

## 2019-05-17 DIAGNOSIS — Z98.891 HISTORY OF CESAREAN DELIVERY: ICD-10-CM

## 2019-05-17 PROCEDURE — 90040 PR PRENATAL FOLLOW UP: CPT | Performed by: OBSTETRICS & GYNECOLOGY

## 2019-05-17 NOTE — PROGRESS NOTES
Pt here today for OB follow up / Pre OP  Reports +FM  WT: 176 lb  BP: 120/78  Pt states had having brownish off and on  X 4 days, states no spotting today. Pt also reports having cramping and sporadic contractions. States no other complaints.   C/s scheduled for 5/24/19 at 0930. C/s check scheduled for 5/31//19 at 0815. Pt notified.   Keegan # [phone]

## 2019-05-17 NOTE — PROGRESS NOTES
OB Followup;    39w1d    Patient Active Problem List    Diagnosis Date Noted   • Supervision of high risk pregnancy in third trimester 2019   • Heartburn in pregnancy in third trimester 2019   • History of C/S x 1 - desires TOLAC, considering BTL 2018   • History of macrosomia in infant in prior pregnancy, currently pregnant 2018   • Hx of molar pregnancy, antepartum 2015       Vitals:    19 1412   BP: 120/78       Patient presents for followup of OB care. Currently doing well . Good fetal movement no leakage of fluid no contractions or vaginal bleeding        Size equals dates, normal fetal heart rate      Patient is preop for repeat  section on 2019 at 0930 hrs. if she is unsuccessful by  prior to that date.    Preoperative counseling performed with medical assistant present  Discussed the risks of pain bleeding infection damage to any or all internal organs including but not limited to bowel intestines bladder uterus tubes nerves blood vessels skin baby possible wound infection possible uterine infection possible blood transfusion with inherent risk of AIDS hepatitis.  All questions    Labor precautions given  Increase oral hydration  Discussed proper weight gain during pregnancy  Continue prenatal vitamins  Increase water intake  Reviewed our group's policy on prenatal visits with all providers in the group    Followup in  1 weeks

## 2019-05-17 NOTE — PROGRESS NOTES
Chief complaint;  This patient is a 28 y.o. female , Patient's last menstrual period was 2018. presents complaining of dysfunctional uterine bleeding.    Review of systems-denies; chest pain, shortness of breath, fever, chills, abdominal pain  Past OB history-  OB History    Para Term  AB Living   4 2 2   1 2   SAB TAB Ectopic Molar Multiple Live Births   1         2      # Outcome Date GA Lbr Nacho/2nd Weight Sex Delivery Anes PTL Lv   4 Current            3 Term 12/05/15 39w5d  4.111 kg (9 lb 1 oz) M CS-Unspec Spinal N ISAIAH   2 SAB  10w1d    SAB         Complications: Molar pregnancy      Birth Comments: Molar pregnancy with D&C   1 Term 07 39w0d  3.572 kg (7 lb 14 oz) F Vag-Vacuum EPI N ISAIAH      Birth Comments: Pt. states no complications.         Past surgical history-   Past Surgical History:   Procedure Laterality Date   • PRIMARY C SECTION  2015    Procedure: PRIMARY C SECTION;  Surgeon: Lindsey Stein M.D.;  Location: LABOR AND DELIVERY;  Service:    • DILATION AND CURETTAGE  3/14/2013    Performed by Kj Castrejon M.D. at SURGERY [facility] ORS   • FOOT SURGERY      right foot surgery age 7     Past medical history- No past medical history on file.  Allergies- Patient has no known allergies.  Present medications-   Outpatient Encounter Prescriptions as of 2019   Medication Sig Dispense Refill   • Prenatal MV-Min-Fe Fum-FA-DHA (PRENATAL 1 PO) Take  by mouth.       • oxycodone-acetaminophen (PERCOCET) 5-325 MG Tab Take 1 Tab by mouth every four hours as needed for Moderate Pain ((Pain Scale 4-6)). (Patient not taking: Reported on 2018) 30 Tab 0   • ibuprofen (MOTRIN) 800 MG Tab Take 1 Tab by mouth every 8 hours as needed (Cramping). (Patient not taking: Reported on 2018) 30 Tab 3   • ferrous fumarate (PAKO-SEQUEL) 50 MG Tab CR Take 1 Tab by mouth with dinner. (Patient not taking: Reported on 2018) 30 Tab 3   • docusate sodium 100 MG Cap  Take 100 mg by mouth 2 times a day as needed for Constipation. (Patient not taking: Reported on 12/19/2018) 60 Cap 3     No facility-administered encounter medications on file as of 5/17/2019.      Family history- no history of breast or ovarian cancer  Social history-   Social History     Social History   • Marital status: Single     Spouse name: N/A   • Number of children: N/A   • Years of education: N/A     Occupational History   • Not on file.     Social History Main Topics   • Smoking status: Never Smoker   • Smokeless tobacco: Never Used   • Alcohol use No   • Drug use: No   • Sexual activity: No      Comment: None     Other Topics Concern   • Not on file     Social History Narrative   • No narrative on file         Physical examination;   Alert and oriented x3  General-well-developed well-nourished female in no apparent distress  There were no vitals filed for this visit.  Skin is warm and dry   HEENT-normocephalic,nontraumatic,PERRLA,EOMI  Throat- no edema or erythema  Neck-supple  Cardiovascular-regular rate and rhythm, normal S1-S2 without murmurs or gallops  Lungs-clear to auscultation bilaterally  Back-negative CVA tenderness  Abdomen-nondistended positive bowel sounds soft nontender without masses or hepatosplenomegaly  Pelvic examination;  External genitalia-without lesions  Vagina-no blood, no discharge  Cervix-closed,no gross lesions  Uterus-  *** weeks size, nontender  Adnexa- without mass or tenderness  Extremities-without cyanosis clubbing or edema  Neurologic-grossly intact    Transvaginal ultrasound; as performed and read by myself; ***    Impression;  Dysfunctional uterine bleeding- ***    Plan;   SS- ***  Labs today   Patient to followup ***      *** Minutes total spent with the patient in face-to-face contact,5 minutes of total time utilized to perform  Ultrasound, greater than 50% of the time has been spent on counseling and coordination of care. Many questions answered regarding possible  miscarriage.

## 2019-05-19 ENCOUNTER — APPOINTMENT (OUTPATIENT)
Dept: RADIOLOGY | Facility: MEDICAL CENTER | Age: 29
End: 2019-05-19
Attending: STUDENT IN AN ORGANIZED HEALTH CARE EDUCATION/TRAINING PROGRAM
Payer: MEDICAID

## 2019-05-19 ENCOUNTER — HOSPITAL ENCOUNTER (INPATIENT)
Facility: MEDICAL CENTER | Age: 29
LOS: 3 days | End: 2019-05-22
Attending: OBSTETRICS & GYNECOLOGY | Admitting: OBSTETRICS & GYNECOLOGY
Payer: MEDICAID

## 2019-05-19 ENCOUNTER — ANESTHESIA (OUTPATIENT)
Dept: OBGYN | Facility: MEDICAL CENTER | Age: 29
End: 2019-05-19
Payer: MEDICAID

## 2019-05-19 ENCOUNTER — ANESTHESIA EVENT (OUTPATIENT)
Dept: OBGYN | Facility: MEDICAL CENTER | Age: 29
End: 2019-05-19
Payer: MEDICAID

## 2019-05-19 LAB — HOLDING TUBE BB 8507: NORMAL

## 2019-05-19 PROCEDURE — 85027 COMPLETE CBC AUTOMATED: CPT

## 2019-05-19 PROCEDURE — 304964 HCHG RECOVERY ROOM TIME 1HR: Performed by: OBSTETRICS & GYNECOLOGY

## 2019-05-19 PROCEDURE — 700111 HCHG RX REV CODE 636 W/ 250 OVERRIDE (IP): Performed by: ANESTHESIOLOGY

## 2019-05-19 PROCEDURE — 59514 CESAREAN DELIVERY ONLY: CPT

## 2019-05-19 PROCEDURE — 700111 HCHG RX REV CODE 636 W/ 250 OVERRIDE (IP)

## 2019-05-19 PROCEDURE — 302135 SEQUENTIAL COMPRESSION MACHINE: Performed by: OBSTETRICS & GYNECOLOGY

## 2019-05-19 PROCEDURE — 770002 HCHG ROOM/CARE - OB PRIVATE (112)

## 2019-05-19 PROCEDURE — 306828 HCHG ANES-TIME GENERAL: Performed by: OBSTETRICS & GYNECOLOGY

## 2019-05-19 PROCEDURE — 700105 HCHG RX REV CODE 258: Performed by: OBSTETRICS & GYNECOLOGY

## 2019-05-19 PROCEDURE — 700102 HCHG RX REV CODE 250 W/ 637 OVERRIDE(OP): Performed by: ANESTHESIOLOGY

## 2019-05-19 PROCEDURE — 305385 HCHG SURGICAL SERVICES 1/4 HOUR: Performed by: OBSTETRICS & GYNECOLOGY

## 2019-05-19 PROCEDURE — 36415 COLL VENOUS BLD VENIPUNCTURE: CPT

## 2019-05-19 PROCEDURE — 59514 CESAREAN DELIVERY ONLY: CPT | Mod: AS | Performed by: NURSE PRACTITIONER

## 2019-05-19 PROCEDURE — 700111 HCHG RX REV CODE 636 W/ 250 OVERRIDE (IP): Performed by: OBSTETRICS & GYNECOLOGY

## 2019-05-19 PROCEDURE — 700105 HCHG RX REV CODE 258

## 2019-05-19 PROCEDURE — 700101 HCHG RX REV CODE 250: Performed by: ANESTHESIOLOGY

## 2019-05-19 PROCEDURE — 76816 OB US FOLLOW-UP PER FETUS: CPT

## 2019-05-19 PROCEDURE — 700111 HCHG RX REV CODE 636 W/ 250 OVERRIDE (IP): Performed by: STUDENT IN AN ORGANIZED HEALTH CARE EDUCATION/TRAINING PROGRAM

## 2019-05-19 PROCEDURE — 85025 COMPLETE CBC W/AUTO DIFF WBC: CPT | Mod: 91

## 2019-05-19 PROCEDURE — 700105 HCHG RX REV CODE 258: Performed by: ANESTHESIOLOGY

## 2019-05-19 RX ORDER — DIPHENHYDRAMINE HYDROCHLORIDE 50 MG/ML
25 INJECTION INTRAMUSCULAR; INTRAVENOUS EVERY 6 HOURS PRN
Status: DISCONTINUED | OUTPATIENT
Start: 2019-05-19 | End: 2019-05-20

## 2019-05-19 RX ORDER — SODIUM CHLORIDE, SODIUM LACTATE, POTASSIUM CHLORIDE, CALCIUM CHLORIDE 600; 310; 30; 20 MG/100ML; MG/100ML; MG/100ML; MG/100ML
INJECTION, SOLUTION INTRAVENOUS CONTINUOUS
Status: DISCONTINUED | OUTPATIENT
Start: 2019-05-19 | End: 2019-05-22 | Stop reason: HOSPADM

## 2019-05-19 RX ORDER — OXYCODONE HYDROCHLORIDE 5 MG/1
5 TABLET ORAL EVERY 4 HOURS PRN
Status: DISCONTINUED | OUTPATIENT
Start: 2019-05-19 | End: 2019-05-19

## 2019-05-19 RX ORDER — OXYCODONE HYDROCHLORIDE 5 MG/1
5 TABLET ORAL EVERY 4 HOURS PRN
Status: DISCONTINUED | OUTPATIENT
Start: 2019-05-19 | End: 2019-05-20

## 2019-05-19 RX ORDER — KETOROLAC TROMETHAMINE 30 MG/ML
INJECTION, SOLUTION INTRAMUSCULAR; INTRAVENOUS
Status: COMPLETED
Start: 2019-05-19 | End: 2019-05-19

## 2019-05-19 RX ORDER — ACETAMINOPHEN 500 MG
1000 TABLET ORAL EVERY 6 HOURS
Status: COMPLETED | OUTPATIENT
Start: 2019-05-19 | End: 2019-05-20

## 2019-05-19 RX ORDER — KETOROLAC TROMETHAMINE 30 MG/ML
30 INJECTION, SOLUTION INTRAMUSCULAR; INTRAVENOUS ONCE
Status: COMPLETED | OUTPATIENT
Start: 2019-05-19 | End: 2019-05-19

## 2019-05-19 RX ORDER — CEFAZOLIN SODIUM 1 G/3ML
INJECTION, POWDER, FOR SOLUTION INTRAMUSCULAR; INTRAVENOUS PRN
Status: DISCONTINUED | OUTPATIENT
Start: 2019-05-19 | End: 2019-05-19 | Stop reason: SURG

## 2019-05-19 RX ORDER — HYDROMORPHONE HYDROCHLORIDE 1 MG/ML
0.2 INJECTION, SOLUTION INTRAMUSCULAR; INTRAVENOUS; SUBCUTANEOUS
Status: DISCONTINUED | OUTPATIENT
Start: 2019-05-19 | End: 2019-05-20

## 2019-05-19 RX ORDER — MORPHINE SULFATE 0.5 MG/ML
INJECTION, SOLUTION EPIDURAL; INTRATHECAL; INTRAVENOUS PRN
Status: DISCONTINUED | OUTPATIENT
Start: 2019-05-19 | End: 2019-05-19 | Stop reason: SURG

## 2019-05-19 RX ORDER — KETOROLAC TROMETHAMINE 30 MG/ML
30 INJECTION, SOLUTION INTRAMUSCULAR; INTRAVENOUS EVERY 6 HOURS
Status: DISPENSED | OUTPATIENT
Start: 2019-05-19 | End: 2019-05-20

## 2019-05-19 RX ORDER — OXYCODONE HYDROCHLORIDE 10 MG/1
10 TABLET ORAL EVERY 4 HOURS PRN
Status: DISCONTINUED | OUTPATIENT
Start: 2019-05-19 | End: 2019-05-20

## 2019-05-19 RX ORDER — SODIUM CHLORIDE, SODIUM GLUCONATE, SODIUM ACETATE, POTASSIUM CHLORIDE AND MAGNESIUM CHLORIDE 526; 502; 368; 37; 30 MG/100ML; MG/100ML; MG/100ML; MG/100ML; MG/100ML
500 INJECTION, SOLUTION INTRAVENOUS CONTINUOUS
Status: DISCONTINUED | OUTPATIENT
Start: 2019-05-19 | End: 2019-05-19

## 2019-05-19 RX ORDER — HYDROMORPHONE HYDROCHLORIDE 1 MG/ML
0.4 INJECTION, SOLUTION INTRAMUSCULAR; INTRAVENOUS; SUBCUTANEOUS
Status: DISCONTINUED | OUTPATIENT
Start: 2019-05-19 | End: 2019-05-20

## 2019-05-19 RX ORDER — SIMETHICONE 80 MG
80 TABLET,CHEWABLE ORAL 4 TIMES DAILY PRN
Status: DISCONTINUED | OUTPATIENT
Start: 2019-05-19 | End: 2019-05-22 | Stop reason: HOSPADM

## 2019-05-19 RX ORDER — SODIUM CHLORIDE, SODIUM LACTATE, POTASSIUM CHLORIDE, CALCIUM CHLORIDE 600; 310; 30; 20 MG/100ML; MG/100ML; MG/100ML; MG/100ML
INJECTION, SOLUTION INTRAVENOUS CONTINUOUS
Status: DISCONTINUED | OUTPATIENT
Start: 2019-05-19 | End: 2019-05-19

## 2019-05-19 RX ORDER — ONDANSETRON 2 MG/ML
4 INJECTION INTRAMUSCULAR; INTRAVENOUS
Status: DISCONTINUED | OUTPATIENT
Start: 2019-05-19 | End: 2019-05-19 | Stop reason: HOSPADM

## 2019-05-19 RX ORDER — METHYLERGONOVINE MALEATE 0.2 MG/ML
0.2 INJECTION INTRAVENOUS
Status: DISCONTINUED | OUTPATIENT
Start: 2019-05-19 | End: 2019-05-22 | Stop reason: HOSPADM

## 2019-05-19 RX ORDER — SODIUM CHLORIDE, SODIUM GLUCONATE, SODIUM ACETATE, POTASSIUM CHLORIDE AND MAGNESIUM CHLORIDE 526; 502; 368; 37; 30 MG/100ML; MG/100ML; MG/100ML; MG/100ML; MG/100ML
1500 INJECTION, SOLUTION INTRAVENOUS ONCE
Status: COMPLETED | OUTPATIENT
Start: 2019-05-19 | End: 2019-05-19

## 2019-05-19 RX ORDER — DOCUSATE SODIUM 100 MG/1
100 CAPSULE, LIQUID FILLED ORAL 2 TIMES DAILY PRN
Status: DISCONTINUED | OUTPATIENT
Start: 2019-05-19 | End: 2019-05-22 | Stop reason: HOSPADM

## 2019-05-19 RX ORDER — BUPIVACAINE HYDROCHLORIDE 7.5 MG/ML
INJECTION, SOLUTION INTRASPINAL PRN
Status: DISCONTINUED | OUTPATIENT
Start: 2019-05-19 | End: 2019-05-19 | Stop reason: SURG

## 2019-05-19 RX ORDER — IBUPROFEN 600 MG/1
600 TABLET ORAL EVERY 6 HOURS PRN
Status: DISCONTINUED | OUTPATIENT
Start: 2019-05-19 | End: 2019-05-19

## 2019-05-19 RX ORDER — CITRIC ACID/SODIUM CITRATE 334-500MG
30 SOLUTION, ORAL ORAL ONCE
Status: COMPLETED | OUTPATIENT
Start: 2019-05-19 | End: 2019-05-19

## 2019-05-19 RX ORDER — ONDANSETRON 2 MG/ML
4 INJECTION INTRAMUSCULAR; INTRAVENOUS EVERY 6 HOURS PRN
Status: DISCONTINUED | OUTPATIENT
Start: 2019-05-19 | End: 2019-05-20

## 2019-05-19 RX ORDER — MISOPROSTOL 200 UG/1
800 TABLET ORAL
Status: DISCONTINUED | OUTPATIENT
Start: 2019-05-19 | End: 2019-05-19 | Stop reason: HOSPADM

## 2019-05-19 RX ORDER — MEPERIDINE HYDROCHLORIDE 25 MG/ML
12.5 INJECTION INTRAMUSCULAR; INTRAVENOUS; SUBCUTANEOUS
Status: DISCONTINUED | OUTPATIENT
Start: 2019-05-19 | End: 2019-05-19 | Stop reason: HOSPADM

## 2019-05-19 RX ORDER — ONDANSETRON 2 MG/ML
4 INJECTION INTRAMUSCULAR; INTRAVENOUS EVERY 6 HOURS PRN
Status: DISCONTINUED | OUTPATIENT
Start: 2019-05-19 | End: 2019-05-19

## 2019-05-19 RX ORDER — METOCLOPRAMIDE HYDROCHLORIDE 5 MG/ML
10 INJECTION INTRAMUSCULAR; INTRAVENOUS ONCE
Status: COMPLETED | OUTPATIENT
Start: 2019-05-19 | End: 2019-05-19

## 2019-05-19 RX ORDER — ONDANSETRON 4 MG/1
4 TABLET, ORALLY DISINTEGRATING ORAL EVERY 6 HOURS PRN
Status: DISCONTINUED | OUTPATIENT
Start: 2019-05-19 | End: 2019-05-19

## 2019-05-19 RX ORDER — DIPHENHYDRAMINE HYDROCHLORIDE 50 MG/ML
12.5 INJECTION INTRAMUSCULAR; INTRAVENOUS EVERY 6 HOURS PRN
Status: DISCONTINUED | OUTPATIENT
Start: 2019-05-19 | End: 2019-05-20

## 2019-05-19 RX ORDER — MISOPROSTOL 200 UG/1
600 TABLET ORAL
Status: DISCONTINUED | OUTPATIENT
Start: 2019-05-19 | End: 2019-05-22 | Stop reason: HOSPADM

## 2019-05-19 RX ORDER — SODIUM CHLORIDE, SODIUM GLUCONATE, SODIUM ACETATE, POTASSIUM CHLORIDE AND MAGNESIUM CHLORIDE 526; 502; 368; 37; 30 MG/100ML; MG/100ML; MG/100ML; MG/100ML; MG/100ML
INJECTION, SOLUTION INTRAVENOUS
Status: DISCONTINUED | OUTPATIENT
Start: 2019-05-19 | End: 2019-05-19 | Stop reason: SURG

## 2019-05-19 RX ORDER — DIPHENHYDRAMINE HYDROCHLORIDE 50 MG/ML
12.5 INJECTION INTRAMUSCULAR; INTRAVENOUS
Status: DISCONTINUED | OUTPATIENT
Start: 2019-05-19 | End: 2019-05-19 | Stop reason: HOSPADM

## 2019-05-19 RX ORDER — SODIUM CHLORIDE, SODIUM LACTATE, POTASSIUM CHLORIDE, CALCIUM CHLORIDE 600; 310; 30; 20 MG/100ML; MG/100ML; MG/100ML; MG/100ML
INJECTION, SOLUTION INTRAVENOUS PRN
Status: DISCONTINUED | OUTPATIENT
Start: 2019-05-19 | End: 2019-05-22 | Stop reason: HOSPADM

## 2019-05-19 RX ORDER — HALOPERIDOL 5 MG/ML
1 INJECTION INTRAMUSCULAR
Status: DISCONTINUED | OUTPATIENT
Start: 2019-05-19 | End: 2019-05-19 | Stop reason: HOSPADM

## 2019-05-19 RX ORDER — SODIUM CHLORIDE, SODIUM GLUCONATE, SODIUM ACETATE, POTASSIUM CHLORIDE AND MAGNESIUM CHLORIDE 526; 502; 368; 37; 30 MG/100ML; MG/100ML; MG/100ML; MG/100ML; MG/100ML
INJECTION, SOLUTION INTRAVENOUS
Status: COMPLETED
Start: 2019-05-19 | End: 2019-05-19

## 2019-05-19 RX ORDER — SODIUM CHLORIDE, SODIUM LACTATE, POTASSIUM CHLORIDE, CALCIUM CHLORIDE 600; 310; 30; 20 MG/100ML; MG/100ML; MG/100ML; MG/100ML
INJECTION, SOLUTION INTRAVENOUS
Status: DISCONTINUED
Start: 2019-05-19 | End: 2019-05-19

## 2019-05-19 RX ORDER — VITAMIN A ACETATE, BETA CAROTENE, ASCORBIC ACID, CHOLECALCIFEROL, .ALPHA.-TOCOPHEROL ACETATE, DL-, THIAMINE MONONITRATE, RIBOFLAVIN, NIACINAMIDE, PYRIDOXINE HYDROCHLORIDE, FOLIC ACID, CYANOCOBALAMIN, CALCIUM CARBONATE, FERROUS FUMARATE, ZINC OXIDE, CUPRIC OXIDE 3080; 12; 120; 400; 1; 1.84; 3; 20; 22; 920; 25; 200; 27; 10; 2 [IU]/1; UG/1; MG/1; [IU]/1; MG/1; MG/1; MG/1; MG/1; MG/1; [IU]/1; MG/1; MG/1; MG/1; MG/1; MG/1
1 TABLET, FILM COATED ORAL EVERY MORNING
Status: DISCONTINUED | OUTPATIENT
Start: 2019-05-20 | End: 2019-05-22 | Stop reason: HOSPADM

## 2019-05-19 RX ADMIN — FAMOTIDINE 20 MG: 10 INJECTION INTRAVENOUS at 19:50

## 2019-05-19 RX ADMIN — Medication 2000 ML/HR: at 21:20

## 2019-05-19 RX ADMIN — KETOROLAC TROMETHAMINE 30 MG: 30 INJECTION, SOLUTION INTRAMUSCULAR; INTRAVENOUS at 22:45

## 2019-05-19 RX ADMIN — SODIUM CHLORIDE, POTASSIUM CHLORIDE, SODIUM LACTATE AND CALCIUM CHLORIDE: 600; 310; 30; 20 INJECTION, SOLUTION INTRAVENOUS at 17:00

## 2019-05-19 RX ADMIN — BUPIVACAINE HYDROCHLORIDE IN DEXTROSE 1.7 ML: 7.5 INJECTION, SOLUTION SUBARACHNOID at 20:45

## 2019-05-19 RX ADMIN — Medication 125 ML/HR: at 22:47

## 2019-05-19 RX ADMIN — MORPHINE SULFATE 0.5 MG: 0.5 INJECTION, SOLUTION EPIDURAL; INTRATHECAL; INTRAVENOUS at 20:45

## 2019-05-19 RX ADMIN — KETOROLAC TROMETHAMINE 30 MG: 30 INJECTION, SOLUTION INTRAMUSCULAR at 22:45

## 2019-05-19 RX ADMIN — CEFAZOLIN 1 G: 330 INJECTION, POWDER, FOR SOLUTION INTRAMUSCULAR; INTRAVENOUS at 20:45

## 2019-05-19 RX ADMIN — SODIUM CHLORIDE, SODIUM GLUCONATE, SODIUM ACETATE, POTASSIUM CHLORIDE AND MAGNESIUM CHLORIDE 1500 ML: 526; 502; 368; 37; 30 INJECTION, SOLUTION INTRAVENOUS at 19:43

## 2019-05-19 RX ADMIN — OXYTOCIN 1000 ML: 10 INJECTION, SOLUTION INTRAMUSCULAR; INTRAVENOUS at 21:21

## 2019-05-19 RX ADMIN — METOCLOPRAMIDE 10 MG: 5 INJECTION, SOLUTION INTRAMUSCULAR; INTRAVENOUS at 19:51

## 2019-05-19 RX ADMIN — SODIUM CITRATE AND CITRIC ACID MONOHYDRATE 30 ML: 500; 334 SOLUTION ORAL at 19:51

## 2019-05-19 RX ADMIN — SODIUM CHLORIDE, POTASSIUM CHLORIDE, SODIUM LACTATE AND CALCIUM CHLORIDE: 600; 310; 30; 20 INJECTION, SOLUTION INTRAVENOUS at 18:26

## 2019-05-19 RX ADMIN — Medication 1 MILLI-UNITS/MIN: at 16:56

## 2019-05-19 RX ADMIN — SODIUM CHLORIDE, SODIUM GLUCONATE, SODIUM ACETATE, POTASSIUM CHLORIDE AND MAGNESIUM CHLORIDE: 526; 502; 368; 37; 30 INJECTION, SOLUTION INTRAVENOUS at 20:45

## 2019-05-19 ASSESSMENT — LIFESTYLE VARIABLES
EVER_SMOKED: NEVER
ALCOHOL_USE: NO

## 2019-05-19 ASSESSMENT — PATIENT HEALTH QUESTIONNAIRE - PHQ9
1. LITTLE INTEREST OR PLEASURE IN DOING THINGS: NOT AT ALL
2. FEELING DOWN, DEPRESSED, IRRITABLE, OR HOPELESS: NOT AT ALL
SUM OF ALL RESPONSES TO PHQ9 QUESTIONS 1 AND 2: 0

## 2019-05-19 NOTE — PROGRESS NOTES
counseling performed with Arielle ZEPEDA present-discussed the risks of uterine rupture which may result in neurologic sequela to the infant including cerebral palsy and possible fetal death.  Possible hysterectomy possible hemorrhage the risk of uterine rupture is 1%.  Patient has signed consent form and all questions were answered in detail.    Estimated fetal weight on ultrasound just now 7 pounds 2 ounces    Contractions every 2 to 4 minutes    Grade 1 fetal heart rate trace    Patient may need minimal Pitocin augmentation

## 2019-05-19 NOTE — H&P
History and Physical      Sue Fraser is a 28 y.o. year old female  at 39w3d dated by LMP/2nd Tri US who presents due to leakage of fluids.  Patient reports that at approximately 1320, she felt a large gush of fluid.  She reports she has been feeling a few contractions since yesterday.  She has a history of one vaginal delivery of a 7lb 14 oz baby which required vacuum assistance, and a history of a  for a 9lb1oz ounce baby due to failure to progress.  She desires a trial of labor after .    PNC with the [facility]PC starting at 17 weks    Subjective:   positive  For CTXS.   negative Feels pain   positive for LOF  negative for vaginal bleeding.   positive for fetal movement    ROS: Patient denies fever, chills, nausea, vomiting , headache, visual disturbance, swelling of hands/face and dysuria.    PMH: None    Past Surgical History:   Procedure Laterality Date   • PRIMARY C SECTION  2015    Procedure: PRIMARY C SECTION;  Surgeon: Lindsey Stein M.D.;  Location: LABOR AND DELIVERY;  Service:    • DILATION AND CURETTAGE  3/14/2013    Performed by Kj Castrejon M.D. at SURGERY [facility] ORS   • FOOT SURGERY      right foot surgery age 7     OB History    Para Term  AB Living   4 2 2   1 2   SAB TAB Ectopic Molar Multiple Live Births   1         2      # Outcome Date GA Lbr Nacho/2nd Weight Sex Delivery Anes PTL Lv   4 Current            3 Term 12/05/15 39w5d  4.111 kg (9 lb 1 oz) M CS-Unspec Spinal N ISAIAH   2 SAB  10w1d    SAB         Complications: Molar pregnancy      Birth Comments: Molar pregnancy with D&C   1 Term 07 39w0d  3.572 kg (7 lb 14 oz) F Vag-Vacuum EPI N ISAIAH      Birth Comments: Pt. states no complications.         GYN History:  Denies history of herpes    Social History     Social History   • Marital status: Single     Spouse name: N/A   • Number of children: N/A   • Years of education: N/A     Occupational History   • Not on file.      Social History Main Topics   • Smoking status: Never Smoker   • Smokeless tobacco: Never Used   • Alcohol use No   • Drug use: No   • Sexual activity: No      Comment: None     Other Topics Concern   • Not on file     Social History Narrative   • No narrative on file     Allergies: Patient has no known allergies.  No current facility-administered medications on file prior to encounter.      Current Outpatient Prescriptions on File Prior to Encounter   Medication Sig Dispense Refill   • oxycodone-acetaminophen (PERCOCET) 5-325 MG Tab Take 1 Tab by mouth every four hours as needed for Moderate Pain ((Pain Scale 4-6)). (Patient not taking: Reported on 12/19/2018) 30 Tab 0   • ibuprofen (MOTRIN) 800 MG Tab Take 1 Tab by mouth every 8 hours as needed (Cramping). (Patient not taking: Reported on 12/19/2018) 30 Tab 3   • ferrous fumarate (PAKO-SEQUEL) 50 MG Tab CR Take 1 Tab by mouth with dinner. (Patient not taking: Reported on 12/19/2018) 30 Tab 3   • docusate sodium 100 MG Cap Take 100 mg by mouth 2 times a day as needed for Constipation. (Patient not taking: Reported on 12/19/2018) 60 Cap 3   • Prenatal MV-Min-Fe Fum-FA-DHA (PRENATAL 1 PO) Take  by mouth.             Objective:      /83   Pulse 100   Ht 1.524 m (5')   Wt 79.8 kg (176 lb)     General: No acute distress, resting comfortably in bed.  HEENT: normocephalic, nontraumatic, EOMI  Cardiovascular: Heart RRR with no murmurs, rubs or gallops. Distal Pulses 2+  Respiratory: symmetric chest expansion, lungs CTAB, with no wheezes, rales, rhonci  Abdomen: gravid, nontender  Musculoskeletal: strength 5/5 in four extremities  Neuro: non focal with no numbness, tingling or changes in sensation  EFW: 3900g  Byng: Uterine Contractions Q4-5 minutes.   FHRM: Baseline 130s, Accels to 160s, no decels, moderate variability  Presentation: cephalic  Cervix:  3cm/70%/-2 leaking copious fluid with thick meconium per RN exam      Lab Review  Lab:   Blood type: O +    HBsAg: non-reactive   HIV: non-reactive   GC: negative   Chlamydia: negative    Rubella: immune    GBS: neg  1 hr GTT:125    Recent Labs      19   1046   ABOGROUP  O   RUBELLAIGG  267.10   URINECULT  Mixed skin augustina >100,000 cfu/mL   HEPBSAG  Negative         Assessment/Plan:   Sue Fraser is a 28 y.o. year old female  at 39w3d dated by LMP/2nd Tri US who presents with SROM with thick meconium. Desires TOLAC.      - Admit to L&D  - Anticipate    - IOL: will start pitocin  - GBS negative, PNL wnl

## 2019-05-20 LAB
BASOPHILS # BLD AUTO: 0.3 % (ref 0–1.8)
BASOPHILS # BLD AUTO: 0.3 % (ref 0–1.8)
BASOPHILS # BLD: 0.02 K/UL (ref 0–0.12)
BASOPHILS # BLD: 0.02 K/UL (ref 0–0.12)
EOSINOPHIL # BLD AUTO: 0.02 K/UL (ref 0–0.51)
EOSINOPHIL # BLD AUTO: 0.02 K/UL (ref 0–0.51)
EOSINOPHIL NFR BLD: 0.3 % (ref 0–6.9)
EOSINOPHIL NFR BLD: 0.3 % (ref 0–6.9)
ERYTHROCYTE [DISTWIDTH] IN BLOOD BY AUTOMATED COUNT: 46.2 FL (ref 35.9–50)
ERYTHROCYTE [DISTWIDTH] IN BLOOD BY AUTOMATED COUNT: 46.3 FL (ref 35.9–50)
ERYTHROCYTE [DISTWIDTH] IN BLOOD BY AUTOMATED COUNT: 47.5 FL (ref 35.9–50)
HCT VFR BLD AUTO: 29.4 % (ref 37–47)
HCT VFR BLD AUTO: 30.5 % (ref 37–47)
HCT VFR BLD AUTO: 34 % (ref 37–47)
HGB BLD-MCNC: 8.8 G/DL (ref 12–16)
HGB BLD-MCNC: 9.2 G/DL (ref 12–16)
HGB BLD-MCNC: 9.9 G/DL (ref 12–16)
IMM GRANULOCYTES # BLD AUTO: 0.02 K/UL (ref 0–0.11)
IMM GRANULOCYTES NFR BLD AUTO: 0.3 % (ref 0–0.9)
LYMPHOCYTES # BLD AUTO: 1.28 K/UL (ref 1–4.8)
LYMPHOCYTES # BLD AUTO: 1.37 K/UL (ref 1–4.8)
LYMPHOCYTES NFR BLD: 18 % (ref 22–41)
LYMPHOCYTES NFR BLD: 21.4 % (ref 22–41)
MCH RBC QN AUTO: 22.4 PG (ref 27–33)
MCH RBC QN AUTO: 23.2 PG (ref 27–33)
MCH RBC QN AUTO: 23.5 PG (ref 27–33)
MCHC RBC AUTO-ENTMCNC: 29.1 G/DL (ref 33.6–35)
MCHC RBC AUTO-ENTMCNC: 29.9 G/DL (ref 33.6–35)
MCHC RBC AUTO-ENTMCNC: 30.2 G/DL (ref 33.6–35)
MCV RBC AUTO: 76.9 FL (ref 81.4–97.8)
MCV RBC AUTO: 77 FL (ref 81.4–97.8)
MCV RBC AUTO: 78.6 FL (ref 81.4–97.8)
MONOCYTES # BLD AUTO: 0.33 K/UL (ref 0–0.85)
MONOCYTES # BLD AUTO: 0.35 K/UL (ref 0–0.85)
MONOCYTES NFR BLD AUTO: 4.6 % (ref 0–13.4)
MONOCYTES NFR BLD AUTO: 5.5 % (ref 0–13.4)
NEUTROPHILS # BLD AUTO: 4.3 K/UL (ref 2–7.15)
NEUTROPHILS # BLD AUTO: 5.84 K/UL (ref 2–7.15)
NEUTROPHILS NFR BLD: 72.2 % (ref 44–72)
NEUTROPHILS NFR BLD: 76.8 % (ref 44–72)
NRBC # BLD AUTO: 0 K/UL
NRBC BLD-RTO: 0 /100 WBC
PLATELET # BLD AUTO: 156 K/UL (ref 164–446)
PLATELET # BLD AUTO: 179 K/UL (ref 164–446)
PLATELET # BLD AUTO: 208 K/UL (ref 164–446)
PMV BLD AUTO: 12 FL (ref 9–12.9)
PMV BLD AUTO: 12.6 FL (ref 9–12.9)
PMV BLD AUTO: 13.1 FL (ref 9–12.9)
RBC # BLD AUTO: 3.74 M/UL (ref 4.2–5.4)
RBC # BLD AUTO: 3.96 M/UL (ref 4.2–5.4)
RBC # BLD AUTO: 4.42 M/UL (ref 4.2–5.4)
WBC # BLD AUTO: 6 K/UL (ref 4.8–10.8)
WBC # BLD AUTO: 7.6 K/UL (ref 4.8–10.8)
WBC # BLD AUTO: 9.8 K/UL (ref 4.8–10.8)

## 2019-05-20 PROCEDURE — A9270 NON-COVERED ITEM OR SERVICE: HCPCS | Performed by: OBSTETRICS & GYNECOLOGY

## 2019-05-20 PROCEDURE — 36415 COLL VENOUS BLD VENIPUNCTURE: CPT

## 2019-05-20 PROCEDURE — 700105 HCHG RX REV CODE 258: Performed by: OBSTETRICS & GYNECOLOGY

## 2019-05-20 PROCEDURE — 700102 HCHG RX REV CODE 250 W/ 637 OVERRIDE(OP): Performed by: ANESTHESIOLOGY

## 2019-05-20 PROCEDURE — 85027 COMPLETE CBC AUTOMATED: CPT

## 2019-05-20 PROCEDURE — 770002 HCHG ROOM/CARE - OB PRIVATE (112)

## 2019-05-20 PROCEDURE — A9270 NON-COVERED ITEM OR SERVICE: HCPCS | Performed by: ANESTHESIOLOGY

## 2019-05-20 PROCEDURE — 700111 HCHG RX REV CODE 636 W/ 250 OVERRIDE (IP): Performed by: ANESTHESIOLOGY

## 2019-05-20 PROCEDURE — 700102 HCHG RX REV CODE 250 W/ 637 OVERRIDE(OP): Performed by: OBSTETRICS & GYNECOLOGY

## 2019-05-20 PROCEDURE — 700112 HCHG RX REV CODE 229: Performed by: OBSTETRICS & GYNECOLOGY

## 2019-05-20 RX ORDER — OXYCODONE HYDROCHLORIDE AND ACETAMINOPHEN 5; 325 MG/1; MG/1
1 TABLET ORAL EVERY 4 HOURS PRN
Status: DISCONTINUED | OUTPATIENT
Start: 2019-05-20 | End: 2019-05-22 | Stop reason: HOSPADM

## 2019-05-20 RX ORDER — OXYCODONE HYDROCHLORIDE 10 MG/1
10 TABLET ORAL EVERY 4 HOURS PRN
Status: DISCONTINUED | OUTPATIENT
Start: 2019-05-20 | End: 2019-05-22 | Stop reason: HOSPADM

## 2019-05-20 RX ORDER — IBUPROFEN 600 MG/1
600 TABLET ORAL EVERY 6 HOURS PRN
Status: DISCONTINUED | OUTPATIENT
Start: 2019-05-20 | End: 2019-05-22 | Stop reason: HOSPADM

## 2019-05-20 RX ORDER — ACETAMINOPHEN 325 MG/1
325 TABLET ORAL EVERY 4 HOURS PRN
Status: DISCONTINUED | OUTPATIENT
Start: 2019-05-20 | End: 2019-05-22 | Stop reason: HOSPADM

## 2019-05-20 RX ORDER — MORPHINE SULFATE 4 MG/ML
4 INJECTION, SOLUTION INTRAMUSCULAR; INTRAVENOUS
Status: DISCONTINUED | OUTPATIENT
Start: 2019-05-20 | End: 2019-05-22 | Stop reason: HOSPADM

## 2019-05-20 RX ADMIN — SIMETHICONE CHEW TAB 80 MG 80 MG: 80 TABLET ORAL at 00:18

## 2019-05-20 RX ADMIN — KETOROLAC TROMETHAMINE 30 MG: 30 INJECTION, SOLUTION INTRAMUSCULAR; INTRAVENOUS at 05:31

## 2019-05-20 RX ADMIN — DOCUSATE SODIUM 100 MG: 100 CAPSULE, LIQUID FILLED ORAL at 00:18

## 2019-05-20 RX ADMIN — ACETAMINOPHEN 1000 MG: 500 TABLET ORAL at 11:54

## 2019-05-20 RX ADMIN — DIPHENHYDRAMINE HYDROCHLORIDE 12.5 MG: 50 INJECTION INTRAMUSCULAR; INTRAVENOUS at 00:19

## 2019-05-20 RX ADMIN — ACETAMINOPHEN 1000 MG: 500 TABLET ORAL at 00:18

## 2019-05-20 RX ADMIN — ACETAMINOPHEN 1000 MG: 500 TABLET ORAL at 05:31

## 2019-05-20 RX ADMIN — SODIUM CHLORIDE, POTASSIUM CHLORIDE, SODIUM LACTATE AND CALCIUM CHLORIDE 1 ML: 600; 310; 30; 20 INJECTION, SOLUTION INTRAVENOUS at 05:37

## 2019-05-20 RX ADMIN — KETOROLAC TROMETHAMINE 30 MG: 30 INJECTION, SOLUTION INTRAMUSCULAR; INTRAVENOUS at 18:18

## 2019-05-20 RX ADMIN — Medication 1 TABLET: at 05:31

## 2019-05-20 RX ADMIN — ACETAMINOPHEN 1000 MG: 500 TABLET ORAL at 18:18

## 2019-05-20 RX ADMIN — ONDANSETRON 4 MG: 2 INJECTION INTRAMUSCULAR; INTRAVENOUS at 05:31

## 2019-05-20 RX ADMIN — KETOROLAC TROMETHAMINE 30 MG: 30 INJECTION, SOLUTION INTRAMUSCULAR; INTRAVENOUS at 11:54

## 2019-05-20 NOTE — LACTATION NOTE
This note was copied from a baby's chart.  Mother reports that she is breastfeeding her  without difficulty or discomfort.

## 2019-05-20 NOTE — PROGRESS NOTES
0900 Assessment completed. Fundus firm, lochia light. ABD incision with dressing in place. Plan of care reviewed, verbalized understanding. Denies pain at this time, will call if pain meds needed. Ambulated to restroom, tolerated well.   1400 Alford removed.   1530 Patient self ambulated to restroom. Voided 100ml without discomfort.

## 2019-05-20 NOTE — PROGRESS NOTES
1900-Report from ERA Shrestha RN. Pt resting comfortably in bed. POC discussed  1911-DIANNA Fields CNM at bedside, SVE by provider  1934-POC discussed with Dr. Frazier and pt, since SVE unchanged, c/s called. Pt verbalized understanding and informed consent obtained. Started preop tasks  2028-Call from Meredith in hematology. Verbal lab results read, Hgb 9.2, Hct 30.5, Platelets 179. Results read back to Meredith in hematology  2044-in OR1  2119-Delivery of viable female infant, apgars 7/9  2158-in PACU, pt stable  2300-Pt transferred to  via [facility]. Report given to Rachel ZEPEDA

## 2019-05-20 NOTE — CARE PLAN
Problem: Pain  Goal: Alleviation of Pain or a reduction in pain to the patient's comfort goal  Outcome: PROGRESSING AS EXPECTED  Pain POC discussed. Pt coping through contractions. Epidural discussed.    Problem: Risk for Infection, Impaired Wound Healing  Goal: Remain free from signs and symptoms of infection  Outcome: PROGRESSING AS EXPECTED  Pt afebrile, no s/s of infection

## 2019-05-20 NOTE — PROGRESS NOTES
1530- report received from JAMEY Mandujano RN, accepted care of pt. Pt does not desire an epidural at this time. Orders for US for EFW to determine if pt with have c/s or TOLAC.   1602- US at bedside  1628- US results reported to Dr. Rose, Dr. Frazier notified of EFW. Orders for pt to TOLAC.   1643- Dr. Frazier at bedside to consent pt for TOLAC.

## 2019-05-20 NOTE — CARE PLAN
Problem: Altered physiologic condition related to postoperative  delivery  Goal: Patient physiologically stable as evidenced by normal lochia, palpable uterine involution and vital signs within normal limits  Outcome: PROGRESSING AS EXPECTED  Fundus firm, lochia light     Problem: Alteration in comfort related to surgical incision and/or after birth pains  Goal: Patient is able to ambulate, care for self and infant with acceptable pain level  Outcome: PROGRESSING AS EXPECTED  Patient ambulating, caring for self and infant.

## 2019-05-20 NOTE — PROGRESS NOTES
SECTION POSTPARTUM PROGRESS NOTE    PATIENT ID:  NAME:  Sue Fraser  MRN:               [id number]  YOB: 1990     28 y.o. female  at 39w3d POD#1 s/p RLTCS for Failed       Subjective: No acute events. Tolerating po Well. Ambulating minimally. Lochia moderate but improving. Voiding, but no Flatus or BM yet.    Objective:    Vitals:    19 0200 19 0300 19 0400 19 0500   BP: 125/73      Pulse: 92 86 70 81   Resp: 18 15 16 18   Temp: 36.9 °C (98.4 °F)      TempSrc: Temporal      SpO2: 96% 98% 94% 97%   Weight:       Height:         General: No acute distress, resting comfortably in bed.  HEENT: normocephalic, nontraumatic, PERRLA, EOMI  Cardiovascular: Heart RRR with no murmurs, rubs or gallops. Distal Pulses 2+  Respiratory: symmetric chest expansion, lungs CTA bilaterally with no wheezes rales or rhonci  Abdomen: soft, mildly tender around incision which is clean, dry and intact, fundus firm, +BS  Genitourinary: lochia light, denies excessive vaginal bleeding  Musculoskeletal: strength 5/5 in four extremities  Neuro: non focal with no numbness, tingling or changes in sensation      No results for input(s): WBC, RBC, HEMOGLOBIN, HEMATOCRIT, MCV, MCH, RDW, PLATELETCT, MPV, NEUTSPOLYS, LYMPHOCYTES, MONOCYTES, EOSINOPHILS, BASOPHILS, RBCMORPHOLO in the last 72 hours.  No results for input(s): SODIUM, POTASSIUM, CHLORIDE, CO2, GLUCOSE, BUN, CPKTOTAL in the last 72 hours.    Current Meds:   Current Facility-Administered Medications   Medication Dose Frequency Provider Last Rate Last Dose   • lactated ringers (LR) infusion   Continuous Eulalio Frazier M.D.       • LR infusion   Continuous Eulalio Frazier M.D.       • oxytocin (PITOCIN) infusion (for postpartum)   mL/hr Continuous Eulalio Frazier M.D. 125 mL/hr at 19 2315 125 mL/hr at 19 2315   • acetaminophen (TYLENOL) tablet 1,000 mg  1,000 mg Q6HR Aleksander Keyes M.D.   1,000 mg at 19  31   • ketorolac (TORADOL) injection 30 mg  30 mg Q6HRS Aleksander Keyes M.D.   30 mg at 19 0531   • oxyCODONE immediate-release (ROXICODONE) tablet 5 mg  5 mg Q4HRS PRN Aleksander Keyes M.D.       • oxyCODONE immediate release (ROXICODONE) tablet 10 mg  10 mg Q4HRS PRN Aleksander Keyes M.D.       • HYDROmorphone pf (DILAUDID) injection 0.2 mg  0.2 mg Q2HRS PRN Aleksander Keyes M.D.       • HYDROmorphone pf (DILAUDID) injection 0.4 mg  0.4 mg Q2HRS PRN Aleksander Keyes M.D.       • ondansetron (ZOFRAN) syringe/vial injection 4 mg  4 mg Q6HRS PRN Aleksander Keyes M.D.   4 mg at 1931   • diphenhydrAMINE (BENADRYL) injection 12.5 mg  12.5 mg Q6HRS PRN Aleksander Keyes M.D.       • diphenhydrAMINE (BENADRYL) injection 12.5 mg  12.5 mg Q6HRS PRN Aleksander Keyes M.D.   12.5 mg at 19 0019    Or   • diphenhydrAMINE (BENADRYL) injection 25 mg  25 mg Q6HRS PRN Aleksander Keyes M.D.        Or   • naloxone (NARCAN) 0.4 mg in NS 1,000 mL infusion  0.4 mg PRN Aleksander Keyes M.D.       • LR infusion   PRN Eulalio Frazier M.D. 125 mL/hr at 19 0537 1 mL at 1937   • PRN oxytocin (PITOCIN) (20 Units/1000 mL) PRN for excessive uterine bleeding - See Admin Instr  125-999 mL/hr Once PRN Eulalio Frazier M.D.       • miSOPROStol (CYTOTEC) tablet 600 mcg  600 mcg Once PRN Eulalio Frazier M.D.       • methylergonovine (METHERGINE) injection 0.2 mg  0.2 mg Once PRN Eulalio Frazier M.D.       • docusate sodium (COLACE) capsule 100 mg  100 mg BID PRN Eulalio Frazier M.D.   100 mg at 198   • simethicone (MYLICON) chewable tab 80 mg  80 mg 4X/DAY PRN Eulalio Frazier M.D.   80 mg at 198   • prenatal plus vitamin (STUARTNATAL 1+1) 27-1 MG tablet 1 Tab  1 Tab QAM Eulalio Frazier M.D.   1 Tab at 19 0531     Last reviewed on 2019  8:06 PM by Nasrin Christian R.N.        Assessment:  28 y.o. female  at 39w3d POD#1 s/p RLTCS for failed      Plan:   1. Routine post-op care  2. Encourage  ambulation  3. Encourage breastfeeding  4. Disposition: Likely DC home on POD#3

## 2019-05-20 NOTE — OP REPORT
DATE OF SERVICE:  2019    PREOPERATIVE DIAGNOSES:  Intrauterine pregnancy at 39+ weeks with arrest of   dilatation secondary to cephalopelvic disproportion with history of previous    section, failed vaginal birth after  section.    POSTOPERATIVE DIAGNOSES:  Intrauterine pregnancy at 39+ weeks with arrest of   dilatation secondary to cephalopelvic disproportion with history of previous    section, failed vaginal birth after  section.    SURGEON:  Eulalio Frazier MD    ASSISTANT:  Zita Fields CNM.    PROCEDURE:  Repeat low transverse uterine  section.    COMPLICATIONS:  None.    ESTIMATED BLOOD LOSS:  500 mL    DRAINS:  Alford catheter.    SPECIMENS:  Cord gas sent to pathology department.    ANESTHESIA:  Spinal.    ANESTHESIOLOGIST:  Aleksander Keyes MD    INDICATIONS:  This patient is a 28-year-old female  4, para 2-0-1-2 at   39+ weeks intrauterine pregnancy.  The patient's OB history is complicated by   previous  section for arrest of dilatation.  The patient was admitted   3-4 cm dilated with no further dilatation despite adequate labor over several   hours.  She has a history of previous successful vaginal delivery prior to her    section.    FINDINGS:  Cephalic presentation, meconium stained amniotic fluid.  Cord gas   results are pending.  Apgar scores 8 and 9, normal pelvis.  Infant's weight   3910 g.    OPERATION:  After adequately being counseled, the patient was taken to the   operating room and placed in supine position.  Spinal anesthetic was placed.    Fetal heart tones were normal before and after placement of spinal.  She was   prepped and draped in the usual sterile fashion.  Alford catheter placed.  A   Pfannenstiel skin incision was made over the previous one, taken down to the   fascia.  The fascia was incised with scalpel and the fascial incision taken   laterally on both sides with Perry scissors.  Rectus fascia was dissected  off   the underlying rectus muscles both superiorly and inferiorly and the rectus   muscles were split in the midline.  The peritoneal cavity was entered bluntly   with digits and the peritoneal incision taken superiorly and inferiorly with   Metzenbaum scissors.  Bladder blade placed over the bladder.  Next, a bladder   flap was developed both sharply and bluntly and a bladder blade replaced over   the bladder.  Next, a low transverse uterine incision was made with a scalpel.    The infant was delivered, bulb suctioned, umbilical cord clamped and cut,   and the infant handed off to pediatrics.  Placenta was removed from the   uterus.  Uterine cavity was cleansed with a moist laparotomy sponge.  Uterus   was exteriorized and the uterine incision was closed in 2 layers using 0   Vicryl in a running locked fashion.  Second layer was an imbricating layer.    Next, hemostasis was confirmed.  The uterus was returned to abdominal pelvic   cavity and hemostasis confirmed in all surgical sites.  Peritoneal lining was   closed using running nonlocked stitch of 0 Vicryl.  Rectus muscles were   reapproximated using interrupted stitch of 0 Vicryl and the rectus fascia   closed using running nonlocked stitch of 0 Vicryl.  Subcutaneous tissues were   irrigated and suctioned, electrocautery used for hemostasis.  Several   subcuticular stitches of 0 Vicryl used to reapproximate subcutaneous tissues.    Skin was closed using surgical staples and a pressure dressing was applied.    The patient was taken to recovery room in good condition.  No complications   noted.       ____________________________________     MD GLYNN CHAPPELL / LUISANA    DD:  05/19/2019 22:00:26  DT:  05/19/2019 22:37:42    D#:  [id number]  Job#:  [id number]

## 2019-05-20 NOTE — PROGRESS NOTES
2315: Patient oriented to room and unit. Report received from Nasrin Labor and Delivery RN. ID bands verified. Assessment complete.

## 2019-05-20 NOTE — ANESTHESIA PROCEDURE NOTES
Spinal Block  Performed by: JOVITA GOMEZ  Authorized by: JOVITA GOMEZ     Reason for Block: primary anesthetic    patient identified, IV checked, site marked, risks and benefits discussed, surgical consent, monitors and equipment checked, pre-op evaluation and timeout performed    Patient Position:  Sitting  Prep: ChloraPrep, patient draped and sterile technique    Monitoring:  Blood pressure, continuous pulse oximetry and heart rate  Approach:  Midline  Location:  L4-5  Injection Technique:  Single-shot  Skin infiltration:  Lidocaine  Strength:  1%  Dose:  3ml  Needle Type:  Pencan  Needle Gauge:  25 G  CSF flowing pre/post injection:  Yes  Sensory Level:  T6

## 2019-05-20 NOTE — CARE PLAN
Problem: Altered physiologic condition related to postoperative  delivery  Goal: Patient physiologically stable as evidenced by normal lochia, palpable uterine involution and vital signs within normal limits  Outcome: PROGRESSING AS EXPECTED  Patient stable and resting comfortably. Physical assessment WDL. (See flowsheet).Vital signs WDL for postpartum state.    Problem: Potential for postpartum infection related to surgical incision, compromised uterine condition, urinary tract or respiratory compromise  Goal: Patient will be afebrile and free from signs and symptoms of infection  Outcome: PROGRESSING AS EXPECTED  No signs or symptoms of infection.

## 2019-05-20 NOTE — ANESTHESIA PREPROCEDURE EVALUATION
Relevant Problems   (+) History of C/S x 1 - desires TOLAC, considering BTL       Physical Exam    Airway   Mallampati: II  TM distance: >3 FB  Neck ROM: full       Cardiovascular - normal exam  Rhythm: regular  Rate: normal  (-) murmur     Dental - normal exam         Pulmonary - normal exam  Breath sounds clear to auscultation     Abdominal    Neurological - normal exam                 Anesthesia Plan    ASA 3 - emergent   ASA physical status emergent criteria: non-reassuring fetal heart tones    Plan - spinal   Neuraxial block will be primary anesthetic  (Pregnancy)          Postoperative Plan: Postoperative administration of opioids is intended.    Pertinent diagnostic labs and testing reviewed    Informed Consent:    Anesthetic plan and risks discussed with patient.

## 2019-05-20 NOTE — ANESTHESIA POSTPROCEDURE EVALUATION
Patient: Sue Fraser    Procedure Summary     Date:  19 Room / Location:  [facility] OR 01 / LABOR AND DELIVERY    Anesthesia Start:   Anesthesia Stop:      Procedure:   SECTION, REPEAT (Abdomen) Diagnosis:       Delivery of pregnancy by  section      (same, del)    Surgeon:  Eulalio Frazier M.D. Responsible Provider:  Aleksander Keyes M.D.    Anesthesia Type:  spinal ASA Status:  3 - Emergent          Final Anesthesia Type: spinal  Last vitals  BP   Blood Pressure: 110/77    Temp   37.6 °C (99.6 °F)    Pulse   Pulse: 82   Resp        SpO2          Anesthesia Post Evaluation    Patient location during evaluation: PACU  Patient participation: complete - patient participated  Level of consciousness: awake and alert    Airway patency: patent  Anesthetic complications: no  Cardiovascular status: hemodynamically stable  Respiratory status: acceptable  Hydration status: euvolemic    PONV: none

## 2019-05-20 NOTE — ANESTHESIA TIME REPORT
Addended by: DONNELL DÍAZ on: 1/4/2019 02:24 PM     Modules accepted: Orders     Anesthesia Start and Stop Event Times     Date Time Event    2019 Anesthesia Start      Anesthesia Stop        Responsible Staff  19    Name Role Begin End    Aleksander Keyes M.D. Anesth 2045        Preop Diagnosis (Free Text):  Pre-op Diagnosis     Failed TOLAC         Preop Diagnosis (Codes):  Diagnosis Information     Diagnosis Code(s): Delivery of pregnancy by  section [O82]        Post op Diagnosis  Pregnancy      Premium Reason  E. Weekend    Comments:

## 2019-05-20 NOTE — ANESTHESIA QCDR
2019 [facility] Clinical Data Registry (for Quality Improvement)     Postoperative nausea/vomiting risk protocol (Adult = 18 yrs and Pediatric 3-17 yrs)- (430 and 463)  General inhalation anesthetic (NOT TIVA) with PONV risk factors: No  Provision of anti-emetic therapy with at least 2 different classes of agents: N/A  Patient DID NOT receive anti-emetic therapy and reason is documented in Medical Record: N/A    Multimodal Pain Management- (AQI59)  Patient undergoing Elective Surgery (i.e. Outpatient, or ASC, or Prescheduled Surgery prior to Hospital Admission): Yes  Use of Multimodal Pain Management, two or more drugs and/or interventions, NOT including systemic opioids: Yes   Exception: Documented allergy to multiple classes of analgesics:  N/A    PACU assessment of acute postoperative pain prior to Anesthesia Care End- Applies to Patients Age = 18- (ABG7)  Initial PACU pain score is which of the following: < 7/10  Patient unable to report pain score: N/A    Post-anesthetic transfer of care checklist/protocol to PACU/ICU- (426 and 427)  Upon conclusion of case, patient transferred to which of the following locations: PACU/Non-ICU  Use of transfer checklist/protocol: Yes  Exclusion: Service Performed in Patient Hospital Room (and thus did not require transfer): N/A    PACU Reintubation- (AQI31)  General anesthesia requiring endotracheal intubation (ETT) along with subsequent extubation in OR or PACU: No  Required reintubation in the PACU: N/A  Extubation was a planned trial documented in the medical record prior to removal of the original airway device: N/A    Unplanned admission to ICU related to anesthesia service up through end of PACU care- (MD51)  Unplanned admission to ICU (not initially anticipated at anesthesia start time): No

## 2019-05-20 NOTE — PROGRESS NOTES
Cervix 3 to 4 cm / 70% effaced no change x5 hours    Discussed options with patient including continued labor versus repeat  section.  Patient opts for repeat  section.    Prep of counseling performed; discussed the risks of pain bleeding infection damage to any or all internal organs including but not limited to bowel intestines bladder uterus tubes ovaries nerves blood vessels skin and baby possible wound hematoma or infection possible blood transfusion with inherent risk of AIDS hepatitis and possible hysterectomy.  Patient states she understands all the risks she is fluent in English.    .

## 2019-05-21 PROCEDURE — 700102 HCHG RX REV CODE 250 W/ 637 OVERRIDE(OP): Performed by: OBSTETRICS & GYNECOLOGY

## 2019-05-21 PROCEDURE — A9270 NON-COVERED ITEM OR SERVICE: HCPCS | Performed by: NURSE PRACTITIONER

## 2019-05-21 PROCEDURE — A9270 NON-COVERED ITEM OR SERVICE: HCPCS | Performed by: OBSTETRICS & GYNECOLOGY

## 2019-05-21 PROCEDURE — 700102 HCHG RX REV CODE 250 W/ 637 OVERRIDE(OP): Performed by: NURSE PRACTITIONER

## 2019-05-21 PROCEDURE — 770002 HCHG ROOM/CARE - OB PRIVATE (112)

## 2019-05-21 RX ORDER — FERROUS SULFATE 325(65) MG
325 TABLET ORAL
Status: DISCONTINUED | OUTPATIENT
Start: 2019-05-21 | End: 2019-05-22 | Stop reason: HOSPADM

## 2019-05-21 RX ADMIN — IBUPROFEN 600 MG: 600 TABLET ORAL at 19:31

## 2019-05-21 RX ADMIN — OXYCODONE HYDROCHLORIDE AND ACETAMINOPHEN 1 TABLET: 5; 325 TABLET ORAL at 23:38

## 2019-05-21 RX ADMIN — OXYCODONE HYDROCHLORIDE AND ACETAMINOPHEN 1 TABLET: 5; 325 TABLET ORAL at 19:31

## 2019-05-21 RX ADMIN — OXYCODONE HYDROCHLORIDE AND ACETAMINOPHEN 1 TABLET: 5; 325 TABLET ORAL at 07:58

## 2019-05-21 RX ADMIN — Medication 1 TABLET: at 06:38

## 2019-05-21 RX ADMIN — FERROUS SULFATE TAB 325 MG (65 MG ELEMENTAL FE) 325 MG: 325 (65 FE) TAB at 07:58

## 2019-05-21 RX ADMIN — IBUPROFEN 600 MG: 600 TABLET ORAL at 07:58

## 2019-05-21 ASSESSMENT — EDINBURGH POSTNATAL DEPRESSION SCALE (EPDS)
I HAVE BEEN SO UNHAPPY THAT I HAVE BEEN CRYING: NO, NEVER
I HAVE BEEN ABLE TO LAUGH AND SEE THE FUNNY SIDE OF THINGS: AS MUCH AS I ALWAYS COULD
I HAVE BLAMED MYSELF UNNECESSARILY WHEN THINGS WENT WRONG: NO, NEVER
I HAVE LOOKED FORWARD WITH ENJOYMENT TO THINGS: AS MUCH AS I EVER DID
I HAVE FELT SAD OR MISERABLE: NO, NOT AT ALL
THINGS HAVE BEEN GETTING ON TOP OF ME: NO, I HAVE BEEN COPING AS WELL AS EVER
I HAVE FELT SCARED OR PANICKY FOR NO GOOD REASON: NO, NOT AT ALL
I HAVE BEEN ANXIOUS OR WORRIED FOR NO GOOD REASON: NO, NOT AT ALL
THE THOUGHT OF HARMING MYSELF HAS OCCURRED TO ME: NEVER
I HAVE BEEN SO UNHAPPY THAT I HAVE HAD DIFFICULTY SLEEPING: NOT AT ALL

## 2019-05-21 NOTE — PROGRESS NOTES
1900: Report received from Dasha ZEPEDA. Patient stable.    2000:Assessment complete.   GI    PEG cancelled as no palliative consult as per hospital policy  no clinical changes  awake but not aware  NGT remains in place, on TF  no n/v or evidence of bleeding per RN   +BM    ROS: not able to obtain    Physical Exam:  Vital Signs Last 24 Hrs  T(C): 37.2 (06 May 2019 16:46), Max: 37.2 (06 May 2019 16:46)  T(F): 98.9 (06 May 2019 16:46), Max: 98.9 (06 May 2019 16:46)  HR: 78 (06 May 2019 16:46) (76 - 104)  BP: 127/74 (06 May 2019 16:46) (106/93 - 127/74)  BP(mean): --  RR: 18 (06 May 2019 16:46) (18 - 20)  SpO2: 98% (06 May 2019 16:46) (94% - 98%)    · Constitutional	detailed exam  · Constitutional Details	moaning, eyes open  NGT in place  · Eyes	detailed exam  · Eyes Details	PERRL  · ENMT	detailed exam  · Neck	detailed exam  · Neck Details	normal  · Back	No deformity or limitation of movement  · Respiratory	detailed exam  · Respiratory Details	respirations labored; rhonchi  -GI: NTND no mass, +BS normal  · Neurological	detailed exam  · Neurological Details	responds to pain  · Skin	detailed exam  · Skin Details	warm and dry  · Musculoskeletal	No joint pain, swelling or deformity; no limitation of movement

## 2019-05-21 NOTE — LACTATION NOTE
This note was copied from a baby's chart.  Physical assessment of baby and mother provided. Introduction to basics of initiating breastfeeding shown at this time to include posture, angle of latch, hand expression, skin to skin and normal  feeding patterns and expectations.    We were able to achieve a latch that mother reports was much more comfortable.

## 2019-05-21 NOTE — PROGRESS NOTES
Obstetrics & Gynecology Post-Delivery Progress Note    Date of Service  2019    28 y.o.  2 s/p , Low Transverse  Failed TOLAC  Delivery date: 19  Breastfeeding: Yes    Events  No events    Subjective  Pain: Yes,  controlled  Bleeding: lochia minimal  PO's: taking regular diet  Voiding: without difficulty  Ambulating: yes  Feeding: breastfeeding well    Objective  Temp:  [36.6 °C (97.8 °F)-37.2 °C (98.9 °F)] 37.2 °C (98.9 °F)  Pulse:  [74-88] 80  Resp:  [16-20] 17  BP: (109-118)/(59-70) 118/68  SpO2:  [93 %-98 %] 96 %    Physical Exam  General: well and resting  Chest/Breasts: nipples intact and breasts soft  Fundus: firm and below umbilicus  Incision: dressing clean, dry, intact  Perineum: well approximated  Extremities: symmetric    Lab Results   Component Value Date    RBC 3.74 (L) 2019    ABOGROUP O 2019    RH POS 2019     Immunization History   Administered Date(s) Administered   • Influenza Vaccine Quad Inj (Pf) 2019   • Influenza Vaccine Quad Inj (Preserved) 2015   • Tdap Vaccine 2015, 2019       Assessment/Plan  Sue Fraser is a 28 y.o.  postop day 2 s/p , Low Transverse .    1. Post care: meeting all goals  2. Hemodynamics: stable  3. Pain: controlled  4. PNL:   Lab Results   Component Value Date    RH POS 2019    RUBELLAIGG 267.10 2019     5. Method of Feeding: plans to breastfeed  6. Method of Contraception: Will follow up  7. Vaccinations:   Immunization History   Administered Date(s) Administered   • Influenza Vaccine Quad Inj (Pf) 2019   • Influenza Vaccine Quad Inj (Preserved) 2015   • Tdap Vaccine 2015, 2019     8. Disposition: likely home postop day 3    No new Assessment & Plan notes have been filed under this hospital service since the last note was generated.  Service: Obstetrics & Gynecology       VTE prophylaxis: patient is ambulatory    Zita Fields D.N.P.

## 2019-05-22 VITALS
TEMPERATURE: 97.6 F | RESPIRATION RATE: 16 BRPM | OXYGEN SATURATION: 98 % | BODY MASS INDEX: 34.55 KG/M2 | HEIGHT: 60 IN | WEIGHT: 176 LBS | HEART RATE: 63 BPM | SYSTOLIC BLOOD PRESSURE: 107 MMHG | DIASTOLIC BLOOD PRESSURE: 79 MMHG

## 2019-05-22 PROCEDURE — 700102 HCHG RX REV CODE 250 W/ 637 OVERRIDE(OP): Performed by: OBSTETRICS & GYNECOLOGY

## 2019-05-22 PROCEDURE — A9270 NON-COVERED ITEM OR SERVICE: HCPCS | Performed by: NURSE PRACTITIONER

## 2019-05-22 PROCEDURE — 700102 HCHG RX REV CODE 250 W/ 637 OVERRIDE(OP): Performed by: NURSE PRACTITIONER

## 2019-05-22 PROCEDURE — A9270 NON-COVERED ITEM OR SERVICE: HCPCS | Performed by: OBSTETRICS & GYNECOLOGY

## 2019-05-22 RX ORDER — OXYCODONE HYDROCHLORIDE AND ACETAMINOPHEN 5; 325 MG/1; MG/1
1 TABLET ORAL EVERY 4 HOURS PRN
Qty: 15 TAB | Refills: 0 | Status: SHIPPED | OUTPATIENT
Start: 2019-05-22 | End: 2019-06-05

## 2019-05-22 RX ORDER — FERROUS SULFATE 325(65) MG
325 TABLET ORAL
Qty: 30 TAB | Refills: 5 | Status: SHIPPED | OUTPATIENT
Start: 2019-05-22 | End: 2020-11-19

## 2019-05-22 RX ORDER — PSEUDOEPHEDRINE HCL 30 MG
100 TABLET ORAL 2 TIMES DAILY PRN
Qty: 30 CAP | Refills: 5 | Status: SHIPPED | OUTPATIENT
Start: 2019-05-22 | End: 2020-11-19

## 2019-05-22 RX ORDER — IBUPROFEN 800 MG/1
800 TABLET ORAL EVERY 6 HOURS PRN
Qty: 30 TAB | Refills: 5 | Status: ON HOLD | OUTPATIENT
Start: 2019-05-22 | End: 2020-09-05

## 2019-05-22 RX ADMIN — IBUPROFEN 600 MG: 600 TABLET ORAL at 10:19

## 2019-05-22 RX ADMIN — OXYCODONE HYDROCHLORIDE AND ACETAMINOPHEN 1 TABLET: 5; 325 TABLET ORAL at 05:58

## 2019-05-22 RX ADMIN — IBUPROFEN 600 MG: 600 TABLET ORAL at 02:15

## 2019-05-22 RX ADMIN — FERROUS SULFATE TAB 325 MG (65 MG ELEMENTAL FE) 325 MG: 325 (65 FE) TAB at 10:18

## 2019-05-22 RX ADMIN — Medication 1 TABLET: at 05:58

## 2019-05-22 RX ADMIN — OXYCODONE HYDROCHLORIDE AND ACETAMINOPHEN 1 TABLET: 5; 325 TABLET ORAL at 10:19

## 2019-05-22 NOTE — PROGRESS NOTES
Assessment complete. Fundus firm, lochia light. VSS. Tolerating diet. Voiding without difficulty. Incision dressing CDI. Pt states passing gas. Pain controlled with prn medications per mar. Will offer pain medications as they become available.  POC discussed with pt. Encouraged to call with needs. Call light in place.

## 2019-05-22 NOTE — CARE PLAN
Problem: Altered physiologic condition related to postoperative  delivery  Goal: Patient physiologically stable as evidenced by normal lochia, palpable uterine involution and vital signs within normal limits  Outcome: PROGRESSING AS EXPECTED  Fundus firm, lochia light    Problem: Alteration in comfort related to surgical incision and/or after birth pains  Goal: Patient is able to ambulate, care for self and infant with acceptable pain level  Outcome: PROGRESSING AS EXPECTED  Patient ambulating, caring for self and infant

## 2019-05-22 NOTE — LACTATION NOTE
Met with MOB for a lactation follow up visit.  Lactation assistance offered, but MOB declined.  MOB stated infant is latching deep onto the breast and is feeding well. MOB denied pain and tissue damage to nipples and areolas with latch.    MOB stated is not interested in applying for the [facility] program.    MOB informed of the outpatient lactation assistance available to her through the [facility] and was invited to attend the Breastfeeding [place].    Breastfeeding Plan:  Continue to breastfeed on demand per feeding cues and within three hours from the last feed for a minimum of 8-12 times in a 24 hour period.    MOB verbalized understanding of all information provided and denied having any further questions at this time.  Encouraged MOB to call for lactation assistance as needed.

## 2019-05-22 NOTE — PROGRESS NOTES
0830 Assessment completed, fundus firm, lochia light. ABD incision with staples open to air intact. No signs of infection. Plan of care reviewed, verbalized understanding.   1330 KRYSTAL Alcocer called, an order for staple removal received.   1345 ABD staples removed, steri strips placed, tolerated well. Patient verbalized understanding of discharge instructions reviewed by discharge RN. Verbalized understanding of medication script provided.   1510 Patient left facility on wheelchair escorted by staff.

## 2019-05-22 NOTE — DISCHARGE SUMMARY
Discharge Summary:      Sue Fraser      Admit Date:   2019  Discharge Date:  2019     Admitting diagnosis:  Pregnancy  Labor and delivery, indication for care  Discharge Diagnosis: Status post  for repeat.  Pregnancy Complications: none  Tubal Ligation:  no        History:  History reviewed. No pertinent past medical history.  OB History    Para Term  AB Living   4 3 3   1 3   SAB TAB Ectopic Molar Multiple Live Births   1       0 3      # Outcome Date GA Lbr Nacho/2nd Weight Sex Delivery Anes PTL Lv   4 Term 19 39w3d  3.91 kg (8 lb 9.9 oz) F CS-LTranv Spinal N ISAIAH   3 Term 12/05/15 39w5d  4.111 kg (9 lb 1 oz) M CS-Unspec Spinal N ISAIAH   2 SAB  10w1d    SAB         Complications: Molar pregnancy      Birth Comments: Molar pregnancy with D&C   1 Term 07 39w0d  3.572 kg (7 lb 14 oz) F Vag-Vacuum EPI N ISAIAH      Birth Comments: Pt. states no complications.            Patient has no known allergies.  Patient Active Problem List    Diagnosis Date Noted   • Delivery of pregnancy by  section 2019   • Supervision of high risk pregnancy in third trimester 2019   • Heartburn in pregnancy in third trimester 2019   • History of C/S x 1 - desires TOLAC, considering BTL 2018   • History of macrosomia in infant in prior pregnancy, currently pregnant 2018   • Hx of molar pregnancy, antepartum 2015        Hospital Course:   28 y.o. , now para 3, was admitted with the above mentioned diagnosis, underwent Active Labor,  for repeat. Patient postpartum course was unremarkable, with progressive advancement in diet , ambulation and toleration of oral analgesia. Patient without complaints today and desires discharge.      Vitals:    19 1400 19 1800 19 0600 19 1800   BP: 109/70 118/66 118/68 122/81   Pulse: 76 84 80 84   Resp: 16 16 17 18   Temp: 36.6 °C (97.8 °F) 36.7 °C (98 °F) 37.2 °C (98.9 °F)  36.3 °C (97.4 °F)   TempSrc: Temporal Temporal Temporal Oral   SpO2: 96% 95% 96% 95%   Weight:       Height:           Current Facility-Administered Medications   Medication Dose   • ferrous sulfate tablet 325 mg  325 mg   • ibuprofen (MOTRIN) tablet 600 mg  600 mg   • acetaminophen (TYLENOL) tablet 325 mg  325 mg   • oxyCODONE-acetaminophen (PERCOCET) 5-325 MG per tablet 1 Tab  1 Tab   • oxyCODONE immediate release (ROXICODONE) tablet 10 mg  10 mg   • morphine (pf) 4 mg/ml injection 4 mg  4 mg   • lactated ringers (LR) infusion     • LR infusion     • oxytocin (PITOCIN) infusion (for postpartum)   mL/hr   • LR infusion     • PRN oxytocin (PITOCIN) (20 Units/1000 mL) PRN for excessive uterine bleeding - See Admin Instr  125-999 mL/hr   • miSOPROStol (CYTOTEC) tablet 600 mcg  600 mcg   • methylergonovine (METHERGINE) injection 0.2 mg  0.2 mg   • docusate sodium (COLACE) capsule 100 mg  100 mg   • simethicone (MYLICON) chewable tab 80 mg  80 mg   • prenatal plus vitamin (STUARTNATAL 1+1) 27-1 MG tablet 1 Tab  1 Tab       Exam:  Breast Exam: negative  Abdomen: Abdomen soft, non-tender. BS normal. No masses,  No organomegaly  Fundus Non Tender: yes  Incision: dry and intact  Perineum: perineum intact  Extremity: extremities, peripheral pulses and reflexes normal, no edema, redness or tenderness in the calves or thighs     Labs:  Recent Labs      05/19/19   1530  05/19/19   1946  05/20/19   0535   WBC  6.0  7.6  9.8   RBC  4.42  3.96*  3.74*   HEMOGLOBIN  9.9*  9.2*  8.8*   HEMATOCRIT  34.0*  30.5*  29.4*   MCV  76.9*  77.0*  78.6*   MCH  22.4*  23.2*  23.5*   MCHC  29.1*  30.2*  29.9*   RDW  46.2  46.3  47.5   PLATELETCT  208  179  156*   MPV  12.0  12.6  13.1*        Activity:   Discharge to home  Pelvic Rest x 6 weeks    Assessment:  normal postpartum course  Discharge Assessment: No areas of skin breakdown/redness; surgical incision intact/healing     Follow up: .[facility] or [facility] in 5 weeks for  vaginal ; 1 week for incision check.  Declines BCM at this time.       Discharge Meds:   Current Outpatient Prescriptions   Medication Sig Dispense Refill   • oxyCODONE-acetaminophen (PERCOCET) 5-325 MG Tab Take 1 Tab by mouth every four hours as needed for up to 14 days. 15 Tab 0   • ibuprofen (MOTRIN) 800 MG Tab Take 1 Tab by mouth every 6 hours as needed (For cramping after delivery; do not give if patient is receiving ketorolac (Toradol)). 30 Tab 5   • docusate sodium 100 MG Cap Take 100 mg by mouth 2 times a day as needed for Constipation. 30 Cap 5   • ferrous sulfate 325 (65 Fe) MG tablet Take 1 Tab by mouth every morning with breakfast. 30 Tab 5       TICO Garcia.

## 2019-05-22 NOTE — PROGRESS NOTES
0436 Assessment completed. Fundus firm, lochia light. ABD incision with staples intact, no signs of infection. Plan of care reviewed, verbalized understanding. Patient will call if pain med intervention needed.

## 2019-05-22 NOTE — DISCHARGE INSTRUCTIONS
POSTPARTUM DISCHARGE INSTRUCTIONS FOR MOM    YOB: 1990   Age: 28 y.o.               Admit Date: 2019     Discharge Date: 2019  Attending Doctor:  Eulalio Frazier M.D.                  Allergies:  Patient has no known allergies.    Discharged to home by car. Discharged via wheelchair, hospital escort: Yes.  Special equipment needed: Not Applicable  Belongings with: Personal  Be sure to schedule a follow-up appointment with your primary care doctor or any specialists as instructed.     Discharge Plan:   Diet Plan: Discussed  Activity Level: Discussed  Confirmed Follow up Appointment: Appointment Scheduled  Confirmed Symptoms Management: Discussed  Medication Reconciliation Updated: Yes  Influenza Vaccine Indication: Not indicated: Previously immunized this influenza season and > 8 years of age    REASONS TO CALL YOUR OBSTETRICIAN:  1.   Persistent fever or shaking chills (Temperature higher than 100.4)  2.   Heavy bleeding (soaking more than 1 pad per hour); Passing clots  3.   Foul odor from vagina  4.   Mastitis (Breast infection; breast pain, chills, fever, redness)  5.   Urinary pain, burning or frequency  6.   Episiotomy infection  7.   Abdominal incision infection  8.   Severe depression longer than 24 hours    HAND WASHING  · Prior to handling the baby.  · Before breastfeeding or bottle feeding baby.  · After using the bathroom or changing the baby's diaper.    WOUND CARE  Ask your physician for additional care instructions.  In general:    ·  Incision:      · Keep clean and dry.    · Do NOT lift anything heavier than your baby for up to 6 weeks.    · There should not be any opening or pus.      VAGINAL CARE  · Nothing inside vagina for 6 weeks: no sexual intercourse, tampons or douching.  · Bleeding may continue for 2-4 weeks.  Amount may vary.    · Call your physician for heavy bleeding which means soaking more than 1 pad per hour    BIRTH CONTROL  · It is possible to become  "pregnant at any time after delivery and while breastfeeding.  · Plan to discuss a method of birth control with your physician at your follow up visit. visit.    DIET AND ELIMINATION  · Eating more fiber (bran cereal, fruits, and vegetables) and drinking plenty of fluids will help to avoid constipation.  · Urinary frequency after childbirth is normal.    POSTPARTUM BLUES  During the first few days after birth, you may experience a sense of the \"blues\" which may include impatience, irritability or even crying.  These feeling come and go quickly.  However, as many as 1 in 10 women experience emotional symptoms known as postpartum depression.    Postpartum depression:  May start as early as the second or third day after delivery or take several weeks or months to develop.  Symptoms of \"blues\" are present, but are more intense:  Crying spells; loss of appetite; feelings of hopelessness or loss of control; fear of touching the baby; over concern or no concern at all about the baby; little or no concern about your own appearance/caring for yourself; and/or inability to sleep or excessive sleeping.  Contact your physician if you are experiencing any of these symptoms.    Crisis Hotline:  · [place] Crisis Hotline:  0-078-ZQIIXDU  Or 1-191.330.1984  · [place] Crisis Hotline:  1-229.510.1703  Or 487-709-9609    PREVENTING SHAKEN BABY:  If you are angry or stressed, PUT THE BABY IN THE CRIB, step away, take some deep breaths, and wait until you are calm to care for the baby.  DO NOT SHAKE THE BABY.  You are not alone, call a supporter for help.    · Crisis Call Center 24/7 crisis line 681-628-8673 or 1-122.834.7892  · You can also text them, text \"ANSWER\" to 454132    QUIT SMOKING/TOBACCO USE:  I understand the use of any tobacco products increases my chance of suffering from future heart disease and could cause other illnesses which may shorten my life. Quitting the use of tobacco products is the single most important thing I " can do to improve my health. For further information on smoking / tobacco cessation call a Toll Free Quit Line at 1-727.802.9619 (*National [employer] [place]) or 1-352.859.1610 (American Lung Association) or you can access the web based program at www.lungusa.org.    · [place] Tobacco Users Help Line:  (703) 170-3688       Toll Free: 1-323.363.8312  · Quit Tobacco Program [facility] (956)009-6577    DEPRESSION / SUICIDE RISK:  As you are discharged from this [facility], it is important to learn how to keep safe from harming yourself.    Recognize the warning signs:  · Abrupt changes in personality, positive or negative- including increase in energy   · Giving away possessions  · Change in eating patterns- significant weight changes-  positive or negative  · Change in sleeping patterns- unable to sleep or sleeping all the time   · Unwillingness or inability to communicate  · Depression  · Unusual sadness, discouragement and loneliness  · Talk of wanting to die  · Neglect of personal appearance   · Rebelliousness- reckless behavior  · Withdrawal from people/activities they love  · Confusion- inability to concentrate     If you or a loved one observes any of these behaviors or has concerns about self-harm, here's what you can do:  · Talk about it- your feelings and reasons for harming yourself  · Remove any means that you might use to hurt yourself (examples: pills, rope, extension cords, firearm)  · Get professional help from the community (Mental Health, Substance Abuse, psychological counseling)  · Do not be alone:Call your Safe Contact- someone whom you trust who will be there for you.  · Call your local CRISIS HOTLINE 521-9025 or 427-996-9495  · Call your local Children's Mobile Crisis Response Team [place] (026) 682-0280 or www.[employer]  · Call the toll free National Suicide Prevention Hotlines   · National Suicide Prevention Lifeline 090-015-UWUU (4595)  · National  [facility] 800-SUICIDE (463-4157)    DISCHARGE SURVEY:  Thank you for choosing [facility].  We hope we provided you with very good care.  You may be receiving a survey in the mail.  Please fill it out.  Your opinion is valuable to us.    ADDITIONAL EDUCATIONAL MATERIALS GIVEN TO PATIENT:        My signature on this form indicates that:  1.  I have reviewed and understand the above information  2.  My questions regarding this information have been answered to my satisfaction.  3.  I have formulated a plan with my discharge nurse to obtain my prescribed medication for home.

## 2019-05-31 ENCOUNTER — POST PARTUM (OUTPATIENT)
Dept: OBGYN | Facility: CLINIC | Age: 29
End: 2019-05-31
Payer: MEDICAID

## 2019-05-31 VITALS — DIASTOLIC BLOOD PRESSURE: 68 MMHG | BODY MASS INDEX: 28.12 KG/M2 | SYSTOLIC BLOOD PRESSURE: 104 MMHG | WEIGHT: 144 LBS

## 2019-05-31 DIAGNOSIS — Z09 POSTOP CHECK: ICD-10-CM

## 2019-05-31 PROCEDURE — 99024 POSTOP FOLLOW-UP VISIT: CPT | Performed by: OBSTETRICS & GYNECOLOGY

## 2019-05-31 NOTE — PROGRESS NOTES
Pt here for c/s check. Pain well control with ibuprofen and percocet. Patient states she having drainage on the right side of the incision.    Good phone# [phone]  Pharmacy verified

## 2019-07-12 ENCOUNTER — POST PARTUM (OUTPATIENT)
Dept: OBGYN | Facility: CLINIC | Age: 29
End: 2019-07-12
Payer: MEDICAID

## 2019-07-12 VITALS — WEIGHT: 143 LBS | DIASTOLIC BLOOD PRESSURE: 60 MMHG | BODY MASS INDEX: 27.93 KG/M2 | SYSTOLIC BLOOD PRESSURE: 100 MMHG

## 2019-07-12 PROBLEM — R12 HEARTBURN IN PREGNANCY IN THIRD TRIMESTER: Status: RESOLVED | Noted: 2019-03-01 | Resolved: 2019-07-12

## 2019-07-12 PROBLEM — Z98.891 HISTORY OF CESAREAN DELIVERY: Status: RESOLVED | Noted: 2018-12-19 | Resolved: 2019-07-12

## 2019-07-12 PROBLEM — O26.893 HEARTBURN IN PREGNANCY IN THIRD TRIMESTER: Status: RESOLVED | Noted: 2019-03-01 | Resolved: 2019-07-12

## 2019-07-12 PROBLEM — O09.93 SUPERVISION OF HIGH RISK PREGNANCY IN THIRD TRIMESTER: Status: RESOLVED | Noted: 2019-03-01 | Resolved: 2019-07-12

## 2019-07-12 PROCEDURE — 0503F POSTPARTUM CARE VISIT: CPT | Performed by: NURSE PRACTITIONER

## 2019-07-12 ASSESSMENT — EDINBURGH POSTNATAL DEPRESSION SCALE (EPDS)
I HAVE BEEN ABLE TO LAUGH AND SEE THE FUNNY SIDE OF THINGS: AS MUCH AS I ALWAYS COULD
THINGS HAVE BEEN GETTING ON TOP OF ME: NO, I HAVE BEEN COPING AS WELL AS EVER
I HAVE BLAMED MYSELF UNNECESSARILY WHEN THINGS WENT WRONG: NOT VERY OFTEN
I HAVE FELT SCARED OR PANICKY FOR NO GOOD REASON: NO, NOT AT ALL
I HAVE FELT SAD OR MISERABLE: NO, NOT AT ALL
TOTAL SCORE: 1
I HAVE BEEN SO UNHAPPY THAT I HAVE HAD DIFFICULTY SLEEPING: NOT AT ALL
I HAVE BEEN ANXIOUS OR WORRIED FOR NO GOOD REASON: NO, NOT AT ALL
I HAVE LOOKED FORWARD WITH ENJOYMENT TO THINGS: AS MUCH AS I EVER DID
I HAVE BEEN SO UNHAPPY THAT I HAVE BEEN CRYING: NO, NEVER
THE THOUGHT OF HARMING MYSELF HAS OCCURRED TO ME: NEVER

## 2019-07-12 ASSESSMENT — ENCOUNTER SYMPTOMS
PSYCHIATRIC NEGATIVE: 1
CONSTITUTIONAL NEGATIVE: 1
CARDIOVASCULAR NEGATIVE: 1
MUSCULOSKELETAL NEGATIVE: 1
GASTROINTESTINAL NEGATIVE: 1
NEUROLOGICAL NEGATIVE: 1
RESPIRATORY NEGATIVE: 1
EYES NEGATIVE: 1

## 2019-07-12 NOTE — PROGRESS NOTES
Subjective:    Sue Fraser is a 29 y.o. female who presents for her postpartum exam 6 weeks following repeat c/s for FTP. Her prenatal course was complicated by history of c/s. She denies dysuria, vaginal bleeding, odor, itching or breast problems. She is breast and bottlefeeding. She desires condoms for her birth control method. Reports x1 sex prior to this appointment. Eating a regular diet without difficulty. Bowel movement are Normal.  Having some midabdominal pain that goes through to lower back once a week Stopped bleeding 1 week ago. Patient Denies Incisional pain, drainage or redness. Patient denies postpartum depression.   Review of Systems   Constitutional: Negative.    HENT: Negative.    Eyes: Negative.    Respiratory: Negative.    Cardiovascular: Negative.    Gastrointestinal: Negative.    Genitourinary: Negative.    Musculoskeletal: Negative.    Skin: Negative.    Neurological: Negative.    Endo/Heme/Allergies: Negative.    Psychiatric/Behavioral: Negative.    All other systems reviewed and are negative.      Problem List     Patient Active Problem List    Diagnosis Date Noted   • Delivery of pregnancy by  section 2019   • Supervision of high risk pregnancy in third trimester 2019   • Heartburn in pregnancy in third trimester 2019   • History of C/S x 1 - desires TOLAC, considering BTL 2018   • History of macrosomia in infant in prior pregnancy, currently pregnant 2018   • Hx of molar pregnancy, antepartum 2015       Objective  EPDS:  1  See PE  Lab: H&H at d/c: 8.8/29.4  /60   Wt 64.9 kg (143 lb)   LMP 2018   BMI 27.93 kg/m²   Physical Exam   Constitutional: She is oriented to person, place, and time and well-developed, well-nourished, and in no distress.   HENT:   Head: Normocephalic and atraumatic.   Nose: Nose normal.   Eyes: Pupils are equal, round, and reactive to light. Conjunctivae and EOM are normal.   Neck: Normal range  of motion. Neck supple. No thyromegaly present.   Cardiovascular: Normal rate, regular rhythm, normal heart sounds and intact distal pulses.    Pulmonary/Chest: Effort normal and breath sounds normal.   Abdominal: Soft. Bowel sounds are normal. She exhibits no distension. There is no tenderness.   Musculoskeletal: Normal range of motion.   Neurological: She is alert and oriented to person, place, and time. Gait normal.   Skin: Skin is warm and dry.   Psychiatric: Mood, memory, affect and judgment normal.   Nursing note and vitals reviewed.    Assessment:    1. PP care of lactating women   2. Exam WNL   3. Pap WNL on 12/2018  4. Desires condoms for contraception       Plan:    1. Breastfeeding support   2. Continue PNV   3. Contraceptive counseling - follow up w health dept,  [facility], or [facility] for any other needs  4. Encouraged condom use   5. Discussed diet, exercise and resumption of sexual activity   6. Preconception guidance for next pregnancy if applicable. C/s risk factors. Folic acid for all women of childbearing age.   7. Well woman exam yearly  8. Discussed pregnancy spacing of 2 years.

## 2019-07-12 NOTE — PROGRESS NOTES
Pt here today for postpartum exam.  Operation Date: 05/19/2019  Currently breast feeding and some formula  BCM: condoms, [facility] and [facility].   LMP: not yet  WT: 143 lb  BP:  100/60  Pt states sometimes she feels a pain on her upper belly and radiates to her lower back. Pt states no other complaints or concerns.   Good ph: [phone]

## 2020-08-31 ENCOUNTER — APPOINTMENT (OUTPATIENT)
Dept: RADIOLOGY | Facility: MEDICAL CENTER | Age: 30
DRG: 831 | End: 2020-08-31
Attending: ADVANCED PRACTICE MIDWIFE

## 2020-08-31 ENCOUNTER — HOSPITAL ENCOUNTER (INPATIENT)
Facility: MEDICAL CENTER | Age: 30
LOS: 4 days | DRG: 831 | End: 2020-09-05
Attending: OBSTETRICS & GYNECOLOGY | Admitting: OBSTETRICS & GYNECOLOGY

## 2020-08-31 DIAGNOSIS — U07.1 COVID-19 VIRUS DETECTED: ICD-10-CM

## 2020-08-31 DIAGNOSIS — K85.10 ACUTE BILIARY PANCREATITIS WITHOUT INFECTION OR NECROSIS: ICD-10-CM

## 2020-08-31 LAB
ABO GROUP BLD: NORMAL
ALBUMIN SERPL BCP-MCNC: 3.7 G/DL (ref 3.2–4.9)
ALBUMIN/GLOB SERPL: 1.1 G/DL
ALP SERPL-CCNC: 150 U/L (ref 30–99)
ALT SERPL-CCNC: 31 U/L (ref 2–50)
AMYLASE SERPL-CCNC: 1949 U/L (ref 20–103)
ANION GAP SERPL CALC-SCNC: 17 MMOL/L (ref 7–16)
AST SERPL-CCNC: 34 U/L (ref 12–45)
BASOPHILS # BLD AUTO: 0.3 % (ref 0–1.8)
BASOPHILS # BLD: 0.03 K/UL (ref 0–0.12)
BILIRUB SERPL-MCNC: 1.4 MG/DL (ref 0.1–1.5)
BLD GP AB SCN SERPL QL: NORMAL
BUN SERPL-MCNC: 7 MG/DL (ref 8–22)
CALCIUM SERPL-MCNC: 9 MG/DL (ref 8.5–10.5)
CHLORIDE SERPL-SCNC: 102 MMOL/L (ref 96–112)
CO2 SERPL-SCNC: 19 MMOL/L (ref 20–33)
CREAT SERPL-MCNC: 0.56 MG/DL (ref 0.5–1.4)
EOSINOPHIL # BLD AUTO: 0 K/UL (ref 0–0.51)
EOSINOPHIL NFR BLD: 0 % (ref 0–6.9)
ERYTHROCYTE [DISTWIDTH] IN BLOOD BY AUTOMATED COUNT: 52.8 FL (ref 35.9–50)
GLOBULIN SER CALC-MCNC: 3.3 G/DL (ref 1.9–3.5)
GLUCOSE SERPL-MCNC: 116 MG/DL (ref 65–99)
HBV SURFACE AG SER QL: ABNORMAL
HCT VFR BLD AUTO: 36.9 % (ref 37–47)
HCV AB SER QL: ABNORMAL
HGB BLD-MCNC: 11.7 G/DL (ref 12–16)
HIV 1+2 AB+HIV1 P24 AG SERPL QL IA: NORMAL
IMM GRANULOCYTES # BLD AUTO: 0.05 K/UL (ref 0–0.11)
IMM GRANULOCYTES NFR BLD AUTO: 0.5 % (ref 0–0.9)
LIPASE SERPL-CCNC: 2784 U/L (ref 11–82)
LYMPHOCYTES # BLD AUTO: 1.04 K/UL (ref 1–4.8)
LYMPHOCYTES NFR BLD: 9.5 % (ref 22–41)
MCH RBC QN AUTO: 28.3 PG (ref 27–33)
MCHC RBC AUTO-ENTMCNC: 31.7 G/DL (ref 33.6–35)
MCV RBC AUTO: 89.1 FL (ref 81.4–97.8)
MONOCYTES # BLD AUTO: 0.18 K/UL (ref 0–0.85)
MONOCYTES NFR BLD AUTO: 1.6 % (ref 0–13.4)
NEUTROPHILS # BLD AUTO: 9.65 K/UL (ref 2–7.15)
NEUTROPHILS NFR BLD: 88.1 % (ref 44–72)
NRBC # BLD AUTO: 0 K/UL
NRBC BLD-RTO: 0 /100 WBC
PLATELET # BLD AUTO: 205 K/UL (ref 164–446)
PMV BLD AUTO: 11.6 FL (ref 9–12.9)
POTASSIUM SERPL-SCNC: 3.2 MMOL/L (ref 3.6–5.5)
PROT SERPL-MCNC: 7 G/DL (ref 6–8.2)
RBC # BLD AUTO: 4.14 M/UL (ref 4.2–5.4)
RH BLD: NORMAL
RUBV AB SER QL: 96.9 IU/ML
SODIUM SERPL-SCNC: 138 MMOL/L (ref 135–145)
TREPONEMA PALLIDUM IGG+IGM AB [PRESENCE] IN SERUM OR PLASMA BY IMMUNOASSAY: ABNORMAL
WBC # BLD AUTO: 11 K/UL (ref 4.8–10.8)

## 2020-08-31 PROCEDURE — 86850 RBC ANTIBODY SCREEN: CPT

## 2020-08-31 PROCEDURE — 86780 TREPONEMA PALLIDUM: CPT

## 2020-08-31 PROCEDURE — 96374 THER/PROPH/DIAG INJ IV PUSH: CPT

## 2020-08-31 PROCEDURE — 83690 ASSAY OF LIPASE: CPT

## 2020-08-31 PROCEDURE — 86762 RUBELLA ANTIBODY: CPT

## 2020-08-31 PROCEDURE — 86901 BLOOD TYPING SEROLOGIC RH(D): CPT

## 2020-08-31 PROCEDURE — 86900 BLOOD TYPING SEROLOGIC ABO: CPT

## 2020-08-31 PROCEDURE — 36415 COLL VENOUS BLD VENIPUNCTURE: CPT

## 2020-08-31 PROCEDURE — A9270 NON-COVERED ITEM OR SERVICE: HCPCS | Performed by: ADVANCED PRACTICE MIDWIFE

## 2020-08-31 PROCEDURE — 82150 ASSAY OF AMYLASE: CPT

## 2020-08-31 PROCEDURE — 87340 HEPATITIS B SURFACE AG IA: CPT

## 2020-08-31 PROCEDURE — 87389 HIV-1 AG W/HIV-1&-2 AB AG IA: CPT

## 2020-08-31 PROCEDURE — 85025 COMPLETE CBC W/AUTO DIFF WBC: CPT

## 2020-08-31 PROCEDURE — 76805 OB US >/= 14 WKS SNGL FETUS: CPT

## 2020-08-31 PROCEDURE — 700102 HCHG RX REV CODE 250 W/ 637 OVERRIDE(OP): Performed by: ADVANCED PRACTICE MIDWIFE

## 2020-08-31 PROCEDURE — 76705 ECHO EXAM OF ABDOMEN: CPT

## 2020-08-31 PROCEDURE — 700105 HCHG RX REV CODE 258: Performed by: ADVANCED PRACTICE MIDWIFE

## 2020-08-31 PROCEDURE — 80053 COMPREHEN METABOLIC PANEL: CPT

## 2020-08-31 PROCEDURE — 86803 HEPATITIS C AB TEST: CPT

## 2020-08-31 PROCEDURE — 700111 HCHG RX REV CODE 636 W/ 250 OVERRIDE (IP): Performed by: ADVANCED PRACTICE MIDWIFE

## 2020-08-31 RX ORDER — MORPHINE SULFATE 4 MG/ML
4 INJECTION, SOLUTION INTRAMUSCULAR; INTRAVENOUS 4 TIMES DAILY PRN
Status: DISCONTINUED | OUTPATIENT
Start: 2020-08-31 | End: 2020-09-01

## 2020-08-31 RX ORDER — ONDANSETRON 4 MG/1
4 TABLET, ORALLY DISINTEGRATING ORAL ONCE
Status: COMPLETED | OUTPATIENT
Start: 2020-08-31 | End: 2020-08-31

## 2020-08-31 RX ORDER — SODIUM CHLORIDE, SODIUM LACTATE, POTASSIUM CHLORIDE, CALCIUM CHLORIDE 600; 310; 30; 20 MG/100ML; MG/100ML; MG/100ML; MG/100ML
INJECTION, SOLUTION INTRAVENOUS CONTINUOUS
Status: DISCONTINUED | OUTPATIENT
Start: 2020-08-31 | End: 2020-09-06 | Stop reason: HOSPADM

## 2020-08-31 RX ORDER — MORPHINE SULFATE 10 MG/5ML
4 SOLUTION ORAL EVERY 6 HOURS PRN
Status: DISCONTINUED | OUTPATIENT
Start: 2020-08-31 | End: 2020-08-31

## 2020-08-31 RX ORDER — HYDROXYZINE 50 MG/1
50 TABLET, FILM COATED ORAL 3 TIMES DAILY PRN
Status: DISCONTINUED | OUTPATIENT
Start: 2020-08-31 | End: 2020-09-06 | Stop reason: HOSPADM

## 2020-08-31 RX ORDER — ONDANSETRON 2 MG/ML
4 INJECTION INTRAMUSCULAR; INTRAVENOUS EVERY 6 HOURS PRN
Status: DISCONTINUED | OUTPATIENT
Start: 2020-08-31 | End: 2020-09-06 | Stop reason: HOSPADM

## 2020-08-31 RX ORDER — MORPHINE SULFATE 4 MG/ML
INJECTION, SOLUTION INTRAMUSCULAR; INTRAVENOUS
Status: DISCONTINUED
Start: 2020-08-31 | End: 2020-08-31

## 2020-08-31 RX ADMIN — ONDANSETRON 4 MG: 4 TABLET, ORALLY DISINTEGRATING ORAL at 20:46

## 2020-08-31 RX ADMIN — LIDOCAINE HYDROCHLORIDE 30 ML: 20 SOLUTION OROPHARYNGEAL at 21:52

## 2020-08-31 RX ADMIN — MORPHINE SULFATE 4 MG: 4 INJECTION INTRAVENOUS at 23:36

## 2020-08-31 RX ADMIN — SODIUM CHLORIDE, POTASSIUM CHLORIDE, SODIUM LACTATE AND CALCIUM CHLORIDE 1000 ML: 600; 310; 30; 20 INJECTION, SOLUTION INTRAVENOUS at 23:24

## 2020-08-31 RX ADMIN — ONDANSETRON 4 MG: 2 INJECTION INTRAMUSCULAR; INTRAVENOUS at 23:23

## 2020-08-31 ASSESSMENT — PAIN SCALES - GENERAL: PAINLEVEL: 10 - WORST POSSIBLE PAIN

## 2020-08-31 ASSESSMENT — PAIN DESCRIPTION - PAIN TYPE: TYPE: ACUTE PAIN

## 2020-08-31 NOTE — LETTER
9/8/2020               Sue Hong Evelio  [address]  [address]  [facility] [address]        Dear Sue (MR#[id number]),      This letter is to inform you that your COVID-19 test result is POSITIVE.  This means that the virus that causes COVID-19 was found in your sample.      A review of your test during your recent visit requires that we notify you of the following:    Your nasal swab was POSITIVE for the novel coronavirus (COVID-19).     The Health Department will be in contact with you soon.      You are encouraged to continue to quarantine and self-isolate according to the CDC guidance unless otherwise directed by the Health Department.  Per the CDC, you should continue to quarantine until: At least 3 days (72 hours) have passed since your fever resolved without the use of fever-reducing medications and you had improvement in your cough and shortness of breath.  You should also remain quarantined until at least 10 days have passed since your symptoms first appeared.    Once any symptoms have resolved, it may be possible to donate plasma to help others that are currently ill with COVID-19. To learn more and apply, please contact the  at [phone] or via e-mail at [email or web address].    For any further questions regarding COVID-19, please contact the [facility] at [phone].  Thank you for your cooperation in the matter.      Sincerely,      [facility]

## 2020-09-01 PROBLEM — E87.6 HYPOKALEMIA: Status: ACTIVE | Noted: 2020-09-01

## 2020-09-01 PROBLEM — Z3A.30 30 WEEKS GESTATION OF PREGNANCY: Status: ACTIVE | Noted: 2020-09-01

## 2020-09-01 PROBLEM — K85.10 ACUTE BILIARY PANCREATITIS WITHOUT INFECTION OR NECROSIS: Status: ACTIVE | Noted: 2020-09-01

## 2020-09-01 LAB
ALBUMIN SERPL BCP-MCNC: 2.9 G/DL (ref 3.2–4.9)
ALBUMIN SERPL BCP-MCNC: 3.3 G/DL (ref 3.2–4.9)
ALBUMIN/GLOB SERPL: 1.1 G/DL
ALBUMIN/GLOB SERPL: 1.1 G/DL
ALP SERPL-CCNC: 120 U/L (ref 30–99)
ALP SERPL-CCNC: 132 U/L (ref 30–99)
ALT SERPL-CCNC: 32 U/L (ref 2–50)
ALT SERPL-CCNC: 34 U/L (ref 2–50)
AMYLASE SERPL-CCNC: 1083 U/L (ref 20–103)
ANION GAP SERPL CALC-SCNC: 12 MMOL/L (ref 7–16)
ANION GAP SERPL CALC-SCNC: 16 MMOL/L (ref 7–16)
APPEARANCE UR: CLEAR
APTT PPP: 28.8 SEC (ref 24.7–36)
AST SERPL-CCNC: 24 U/L (ref 12–45)
AST SERPL-CCNC: 25 U/L (ref 12–45)
BACTERIA #/AREA URNS HPF: NEGATIVE /HPF
BASOPHILS # BLD AUTO: 0.1 % (ref 0–1.8)
BASOPHILS # BLD AUTO: 0.2 % (ref 0–1.8)
BASOPHILS # BLD: 0.01 K/UL (ref 0–0.12)
BASOPHILS # BLD: 0.02 K/UL (ref 0–0.12)
BILIRUB SERPL-MCNC: 1.4 MG/DL (ref 0.1–1.5)
BILIRUB SERPL-MCNC: 2 MG/DL (ref 0.1–1.5)
BILIRUB UR QL STRIP.AUTO: ABNORMAL
BUN SERPL-MCNC: 6 MG/DL (ref 8–22)
BUN SERPL-MCNC: 8 MG/DL (ref 8–22)
CALCIUM SERPL-MCNC: 8 MG/DL (ref 8.5–10.5)
CALCIUM SERPL-MCNC: 8.5 MG/DL (ref 8.5–10.5)
CHLORIDE SERPL-SCNC: 102 MMOL/L (ref 96–112)
CHLORIDE SERPL-SCNC: 104 MMOL/L (ref 96–112)
CO2 SERPL-SCNC: 19 MMOL/L (ref 20–33)
CO2 SERPL-SCNC: 20 MMOL/L (ref 20–33)
COLOR UR: ABNORMAL
COVID ORDER STATUS COVID19: NORMAL
CREAT SERPL-MCNC: 0.38 MG/DL (ref 0.5–1.4)
CREAT SERPL-MCNC: 0.51 MG/DL (ref 0.5–1.4)
EOSINOPHIL # BLD AUTO: 0 K/UL (ref 0–0.51)
EOSINOPHIL # BLD AUTO: 0 K/UL (ref 0–0.51)
EOSINOPHIL NFR BLD: 0 % (ref 0–6.9)
EOSINOPHIL NFR BLD: 0 % (ref 0–6.9)
EPI CELLS #/AREA URNS HPF: ABNORMAL /HPF
ERYTHROCYTE [DISTWIDTH] IN BLOOD BY AUTOMATED COUNT: 50.6 FL (ref 35.9–50)
ERYTHROCYTE [DISTWIDTH] IN BLOOD BY AUTOMATED COUNT: 52.1 FL (ref 35.9–50)
FIBRINOGEN PPP-MCNC: 392 MG/DL (ref 215–460)
GLOBULIN SER CALC-MCNC: 2.6 G/DL (ref 1.9–3.5)
GLOBULIN SER CALC-MCNC: 3 G/DL (ref 1.9–3.5)
GLUCOSE SERPL-MCNC: 102 MG/DL (ref 65–99)
GLUCOSE SERPL-MCNC: 120 MG/DL (ref 65–99)
GLUCOSE UR STRIP.AUTO-MCNC: NEGATIVE MG/DL
HCT VFR BLD AUTO: 30.1 % (ref 37–47)
HCT VFR BLD AUTO: 33.6 % (ref 37–47)
HGB BLD-MCNC: 10.8 G/DL (ref 12–16)
HGB BLD-MCNC: 9.7 G/DL (ref 12–16)
HYALINE CASTS #/AREA URNS LPF: ABNORMAL /LPF
IMM GRANULOCYTES # BLD AUTO: 0.06 K/UL (ref 0–0.11)
IMM GRANULOCYTES # BLD AUTO: 0.06 K/UL (ref 0–0.11)
IMM GRANULOCYTES NFR BLD AUTO: 0.6 % (ref 0–0.9)
IMM GRANULOCYTES NFR BLD AUTO: 0.6 % (ref 0–0.9)
INR PPP: 1.02 (ref 0.87–1.13)
KETONES UR STRIP.AUTO-MCNC: >=160 MG/DL
LEUKOCYTE ESTERASE UR QL STRIP.AUTO: NEGATIVE
LIPASE SERPL-CCNC: 996 U/L (ref 11–82)
LYMPHOCYTES # BLD AUTO: 1.02 K/UL (ref 1–4.8)
LYMPHOCYTES # BLD AUTO: 1.15 K/UL (ref 1–4.8)
LYMPHOCYTES NFR BLD: 10.6 % (ref 22–41)
LYMPHOCYTES NFR BLD: 11.8 % (ref 22–41)
MCH RBC QN AUTO: 28.3 PG (ref 27–33)
MCH RBC QN AUTO: 28.5 PG (ref 27–33)
MCHC RBC AUTO-ENTMCNC: 32.1 G/DL (ref 33.6–35)
MCHC RBC AUTO-ENTMCNC: 32.2 G/DL (ref 33.6–35)
MCV RBC AUTO: 87.8 FL (ref 81.4–97.8)
MCV RBC AUTO: 88.7 FL (ref 81.4–97.8)
MICRO URNS: ABNORMAL
MONOCYTES # BLD AUTO: 0.19 K/UL (ref 0–0.85)
MONOCYTES # BLD AUTO: 0.37 K/UL (ref 0–0.85)
MONOCYTES NFR BLD AUTO: 2 % (ref 0–13.4)
MONOCYTES NFR BLD AUTO: 3.8 % (ref 0–13.4)
NEUTROPHILS # BLD AUTO: 8.18 K/UL (ref 2–7.15)
NEUTROPHILS # BLD AUTO: 8.35 K/UL (ref 2–7.15)
NEUTROPHILS NFR BLD: 83.7 % (ref 44–72)
NEUTROPHILS NFR BLD: 86.6 % (ref 44–72)
NITRITE UR QL STRIP.AUTO: POSITIVE
NRBC # BLD AUTO: 0 K/UL
NRBC # BLD AUTO: 0 K/UL
NRBC BLD-RTO: 0 /100 WBC
NRBC BLD-RTO: 0 /100 WBC
PH UR STRIP.AUTO: 6 [PH] (ref 5–8)
PLATELET # BLD AUTO: 173 K/UL (ref 164–446)
PLATELET # BLD AUTO: 197 K/UL (ref 164–446)
PMV BLD AUTO: 11.1 FL (ref 9–12.9)
PMV BLD AUTO: 11.1 FL (ref 9–12.9)
POTASSIUM SERPL-SCNC: 3.4 MMOL/L (ref 3.6–5.5)
POTASSIUM SERPL-SCNC: 3.7 MMOL/L (ref 3.6–5.5)
PROT SERPL-MCNC: 5.5 G/DL (ref 6–8.2)
PROT SERPL-MCNC: 6.3 G/DL (ref 6–8.2)
PROT UR QL STRIP: 100 MG/DL
PROTHROMBIN TIME: 13.7 SEC (ref 12–14.6)
RBC # BLD AUTO: 3.43 M/UL (ref 4.2–5.4)
RBC # BLD AUTO: 3.79 M/UL (ref 4.2–5.4)
RBC # URNS HPF: ABNORMAL /HPF
RBC UR QL AUTO: NEGATIVE
SARS-COV-2 RDRP RESP QL NAA+PROBE: DETECTED
SODIUM SERPL-SCNC: 134 MMOL/L (ref 135–145)
SODIUM SERPL-SCNC: 139 MMOL/L (ref 135–145)
SP GR UR STRIP.AUTO: 1.03
SPECIMEN SOURCE: ABNORMAL
URATE SERPL-MCNC: 4 MG/DL (ref 1.9–8.2)
UROBILINOGEN UR STRIP.AUTO-MCNC: 1 MG/DL
WBC # BLD AUTO: 9.6 K/UL (ref 4.8–10.8)
WBC # BLD AUTO: 9.8 K/UL (ref 4.8–10.8)
WBC #/AREA URNS HPF: ABNORMAL /HPF

## 2020-09-01 PROCEDURE — 84550 ASSAY OF BLOOD/URIC ACID: CPT

## 2020-09-01 PROCEDURE — A9270 NON-COVERED ITEM OR SERVICE: HCPCS | Performed by: HOSPITALIST

## 2020-09-01 PROCEDURE — 700102 HCHG RX REV CODE 250 W/ 637 OVERRIDE(OP): Performed by: OBSTETRICS & GYNECOLOGY

## 2020-09-01 PROCEDURE — 87491 CHLMYD TRACH DNA AMP PROBE: CPT

## 2020-09-01 PROCEDURE — 99222 1ST HOSP IP/OBS MODERATE 55: CPT | Performed by: ADVANCED PRACTICE MIDWIFE

## 2020-09-01 PROCEDURE — U0004 COV-19 TEST NON-CDC HGH THRU: HCPCS

## 2020-09-01 PROCEDURE — A9270 NON-COVERED ITEM OR SERVICE: HCPCS | Performed by: OBSTETRICS & GYNECOLOGY

## 2020-09-01 PROCEDURE — 700102 HCHG RX REV CODE 250 W/ 637 OVERRIDE(OP): Performed by: ADVANCED PRACTICE MIDWIFE

## 2020-09-01 PROCEDURE — 96376 TX/PRO/DX INJ SAME DRUG ADON: CPT

## 2020-09-01 PROCEDURE — 80053 COMPREHEN METABOLIC PANEL: CPT

## 2020-09-01 PROCEDURE — 85025 COMPLETE CBC W/AUTO DIFF WBC: CPT

## 2020-09-01 PROCEDURE — 700105 HCHG RX REV CODE 258: Performed by: OBSTETRICS & GYNECOLOGY

## 2020-09-01 PROCEDURE — 700105 HCHG RX REV CODE 258: Performed by: ADVANCED PRACTICE MIDWIFE

## 2020-09-01 PROCEDURE — 700111 HCHG RX REV CODE 636 W/ 250 OVERRIDE (IP): Performed by: STUDENT IN AN ORGANIZED HEALTH CARE EDUCATION/TRAINING PROGRAM

## 2020-09-01 PROCEDURE — 83690 ASSAY OF LIPASE: CPT

## 2020-09-01 PROCEDURE — 87591 N.GONORRHOEAE DNA AMP PROB: CPT

## 2020-09-01 PROCEDURE — 700111 HCHG RX REV CODE 636 W/ 250 OVERRIDE (IP): Performed by: ADVANCED PRACTICE MIDWIFE

## 2020-09-01 PROCEDURE — 99232 SBSQ HOSP IP/OBS MODERATE 35: CPT | Performed by: OBSTETRICS & GYNECOLOGY

## 2020-09-01 PROCEDURE — 82150 ASSAY OF AMYLASE: CPT

## 2020-09-01 PROCEDURE — 85384 FIBRINOGEN ACTIVITY: CPT

## 2020-09-01 PROCEDURE — 700102 HCHG RX REV CODE 250 W/ 637 OVERRIDE(OP): Performed by: HOSPITALIST

## 2020-09-01 PROCEDURE — C9803 HOPD COVID-19 SPEC COLLECT: HCPCS | Performed by: OBSTETRICS & GYNECOLOGY

## 2020-09-01 PROCEDURE — 99253 IP/OBS CNSLTJ NEW/EST LOW 45: CPT | Performed by: HOSPITALIST

## 2020-09-01 PROCEDURE — 85730 THROMBOPLASTIN TIME PARTIAL: CPT

## 2020-09-01 PROCEDURE — A9270 NON-COVERED ITEM OR SERVICE: HCPCS | Performed by: ADVANCED PRACTICE MIDWIFE

## 2020-09-01 PROCEDURE — 770002 HCHG ROOM/CARE - OB PRIVATE (112)

## 2020-09-01 PROCEDURE — 85610 PROTHROMBIN TIME: CPT

## 2020-09-01 PROCEDURE — 81001 URINALYSIS AUTO W/SCOPE: CPT

## 2020-09-01 PROCEDURE — 36415 COLL VENOUS BLD VENIPUNCTURE: CPT

## 2020-09-01 PROCEDURE — 96375 TX/PRO/DX INJ NEW DRUG ADDON: CPT

## 2020-09-01 PROCEDURE — 87086 URINE CULTURE/COLONY COUNT: CPT

## 2020-09-01 PROCEDURE — 59025 FETAL NON-STRESS TEST: CPT

## 2020-09-01 RX ORDER — MORPHINE SULFATE 4 MG/ML
4 INJECTION, SOLUTION INTRAMUSCULAR; INTRAVENOUS EVERY 4 HOURS PRN
Status: DISCONTINUED | OUTPATIENT
Start: 2020-09-01 | End: 2020-09-06 | Stop reason: HOSPADM

## 2020-09-01 RX ORDER — SODIUM CHLORIDE, SODIUM LACTATE, POTASSIUM CHLORIDE, AND CALCIUM CHLORIDE .6; .31; .03; .02 G/100ML; G/100ML; G/100ML; G/100ML
400 INJECTION, SOLUTION INTRAVENOUS ONCE
Status: ACTIVE | OUTPATIENT
Start: 2020-09-01 | End: 2020-09-02

## 2020-09-01 RX ORDER — PROMETHAZINE HYDROCHLORIDE 25 MG/1
25 TABLET ORAL EVERY 6 HOURS PRN
Status: DISCONTINUED | OUTPATIENT
Start: 2020-09-01 | End: 2020-09-06 | Stop reason: HOSPADM

## 2020-09-01 RX ORDER — ACETAMINOPHEN 325 MG/1
650 TABLET ORAL EVERY 4 HOURS PRN
Status: DISCONTINUED | OUTPATIENT
Start: 2020-09-01 | End: 2020-09-06 | Stop reason: HOSPADM

## 2020-09-01 RX ORDER — METOCLOPRAMIDE HYDROCHLORIDE 5 MG/ML
10 INJECTION INTRAMUSCULAR; INTRAVENOUS EVERY 6 HOURS
Status: DISCONTINUED | OUTPATIENT
Start: 2020-09-01 | End: 2020-09-02

## 2020-09-01 RX ORDER — POTASSIUM CHLORIDE 20 MEQ/1
40 TABLET, EXTENDED RELEASE ORAL ONCE
Status: COMPLETED | OUTPATIENT
Start: 2020-09-01 | End: 2020-09-01

## 2020-09-01 RX ADMIN — ACETAMINOPHEN 650 MG: 325 TABLET, FILM COATED ORAL at 17:06

## 2020-09-01 RX ADMIN — SODIUM CHLORIDE, POTASSIUM CHLORIDE, SODIUM LACTATE AND CALCIUM CHLORIDE: 600; 310; 30; 20 INJECTION, SOLUTION INTRAVENOUS at 14:45

## 2020-09-01 RX ADMIN — FAMOTIDINE 20 MG: 10 INJECTION INTRAVENOUS at 09:45

## 2020-09-01 RX ADMIN — ACETAMINOPHEN 650 MG: 325 TABLET, FILM COATED ORAL at 10:46

## 2020-09-01 RX ADMIN — SODIUM CHLORIDE, POTASSIUM CHLORIDE, SODIUM LACTATE AND CALCIUM CHLORIDE: 600; 310; 30; 20 INJECTION, SOLUTION INTRAVENOUS at 09:57

## 2020-09-01 RX ADMIN — METOCLOPRAMIDE 10 MG: 5 INJECTION, SOLUTION INTRAMUSCULAR; INTRAVENOUS at 17:01

## 2020-09-01 RX ADMIN — MORPHINE SULFATE 4 MG: 4 INJECTION INTRAVENOUS at 06:28

## 2020-09-01 RX ADMIN — SODIUM CHLORIDE, POTASSIUM CHLORIDE, SODIUM LACTATE AND CALCIUM CHLORIDE: 600; 310; 30; 20 INJECTION, SOLUTION INTRAVENOUS at 19:18

## 2020-09-01 RX ADMIN — HYDROXYZINE HYDROCHLORIDE 50 MG: 50 TABLET, FILM COATED ORAL at 01:41

## 2020-09-01 RX ADMIN — POTASSIUM CHLORIDE 40 MEQ: 1500 TABLET, EXTENDED RELEASE ORAL at 20:51

## 2020-09-01 RX ADMIN — ONDANSETRON 4 MG: 2 INJECTION INTRAMUSCULAR; INTRAVENOUS at 07:32

## 2020-09-01 RX ADMIN — SODIUM CHLORIDE, POTASSIUM CHLORIDE, SODIUM LACTATE AND CALCIUM CHLORIDE 1000 ML: 600; 310; 30; 20 INJECTION, SOLUTION INTRAVENOUS at 06:31

## 2020-09-01 RX ADMIN — METOCLOPRAMIDE 10 MG: 5 INJECTION, SOLUTION INTRAMUSCULAR; INTRAVENOUS at 09:39

## 2020-09-01 ASSESSMENT — PATIENT HEALTH QUESTIONNAIRE - PHQ9
2. FEELING DOWN, DEPRESSED, IRRITABLE, OR HOPELESS: NOT AT ALL
SUM OF ALL RESPONSES TO PHQ9 QUESTIONS 1 AND 2: 0
1. LITTLE INTEREST OR PLEASURE IN DOING THINGS: NOT AT ALL

## 2020-09-01 ASSESSMENT — PAIN DESCRIPTION - PAIN TYPE: TYPE: REFERRED PAIN

## 2020-09-01 NOTE — NON-PROVIDER
PATIENT ID:  NAME:  Sue Fraser  MRN:               [id number]  YOB: 1990    CC:  Epigastric pain and n/v    HPI:  Sue Fraser is a 30 y.o. female  at 30w0d by [facility].  Patient presents complaining of no uterine contractions, with no loss of fluid.  Positive fetal movement.  Denies vaginal bleeding.  Pregnancy has been complicated by no prenatal care and short interval between pregnancies. Last delivery 19 by . She reports severe back pain radiating around her right side to her sternum. Reports RUQ tenderness. States this pain has happened in the past but cant recall how often or when it last happened. Reports vomiting today, but can't recall how many times. Patient reports pain began upon awakening today but was worsened after eating eggs. Pain is worsened when reclining. Patient reports the pain usually is resolved by the vomiting, buyt was not resolved this time. Also reports taking tylenol with no relief.    ROS: Patient denies any fever chills, headache, chest pain, shortness of breath, or dysuria or unusual swelling of hands or feet.     Prenatal Care: None obtained, has appointment 2020 with [facility]    Prenatal Labs:   HepBsAg: pending HIV: pending Rubella: pending   RPR: pending PAP: wnl on 2018 GBS: not done   GC/CT: pending O+ / Ab pending Quad Screen: not done     Recent Labs     20  2123   WBC 11.0*   RBC 4.14*   HEMOGLOBIN 11.7*   HEMATOCRIT 36.9*   MCV 89.1   MCH 28.3   RDW 52.8*   PLATELETCT 205   MPV 11.6   NEUTSPOLYS 88.10*   LYMPHOCYTES 9.50*   MONOCYTES 1.60   EOSINOPHILS 0.00   BASOPHILS 0.30     No results for input(s): SODIUM, POTASSIUM, CHLORIDE, CO2, GLUCOSE, BUN, CPKTOTAL in the last 72 hours.       IMAGING:  OB ultrasound in process, results pending     POB Hx:  OB History    Para Term  AB Living   5 3 3   1 3   SAB TAB Ectopic Molar Multiple Live Births   1       0 3      # Outcome Date GA  Lbr Nacho/2nd Weight Sex Delivery Anes PTL Lv   5 Current            4 Term 19 39w3d  3.91 kg (8 lb 9.9 oz) F CS-LTranv Spinal N ISAIAH   3 Term 12/05/15 39w5d  4.111 kg (9 lb 1 oz) M CS-Unspec Spinal N ISAIAH   2 SAB  10w1d    SAB         Birth Comments: Molar pregnancy with D&C      Complications: Molar pregnancy   1 Term 07 39w0d  3.572 kg (7 lb 14 oz) F Vag-Vacuum EPI N ISAIAH      Birth Comments: Pt. states no complications.        PMH/Problem List:    No past medical history on file.  Patient Active Problem List    Diagnosis Date Noted   • Delivery of pregnancy by  section 2019   • History of macrosomia in infant in prior pregnancy, currently pregnant 2018   • Hx of molar pregnancy, antepartum 2015       Current Outpatient Medications:  No current facility-administered medications on file prior to encounter.      Current Outpatient Medications on File Prior to Encounter   Medication Sig Dispense Refill   • ibuprofen (MOTRIN) 800 MG Tab Take 1 Tab by mouth every 6 hours as needed (For cramping after delivery; do not give if patient is receiving ketorolac (Toradol)). 30 Tab 5   • docusate sodium 100 MG Cap Take 100 mg by mouth 2 times a day as needed for Constipation. 30 Cap 5   • ferrous sulfate 325 (65 Fe) MG tablet Take 1 Tab by mouth every morning with breakfast. 30 Tab 5   • Prenatal MV-Min-Fe Fum-FA-DHA (PRENATAL 1 PO) Take  by mouth.           PSH:    Past Surgical History:   Procedure Laterality Date   • REPEAT C SECTION Bilateral 2019    Procedure:  SECTION, REPEAT;  Surgeon: Eulalio Frazier M.D.;  Location: LABOR AND DELIVERY;  Service: Labor and Delivery   • PRIMARY C SECTION  2015    Procedure: PRIMARY C SECTION;  Surgeon: Lindsey Stein M.D.;  Location: LABOR AND DELIVERY;  Service:    • DILATION AND CURETTAGE  3/14/2013    Performed by Kj Castrejon M.D. at [facility] ORS   • FOOT SURGERY      right foot surgery age 7        Allergies:   No Known Allergies      PHYSICAL EXAM:  Vitals:    20   BP: 112/61   Pulse: 85   Resp: 16   Temp: 35.9 °C (96.6 °F)   TempSrc: Temporal   SpO2: 98%   Weight: 73.9 kg (163 lb)   Height: 1.524 m (5')     Temp (24hrs), Av.9 °C (96.6 °F), Min:35.9 °C (96.6 °F), Max:35.9 °C (96.6 °F)    General: Mild distress, lying back for US.  HEENT: normocephalic, nontraumatic, PERRLA, EOMI  Cardiovascular: Heart RRR with no murmurs, rubs or gallops. Distal Pulses 2+  Respiratory: symmetric chest expansion, lungs CTA bilaterally with no wheezes rales or  Rhonci, respirations labored and shallow  Abdomen: gravid, tender, positive Rose's sign  Musculoskeletal: strength 5/5 in four extremities  Neuro: non focal with no numbness, tingling or changes in sensation    SVE: not indicated  [place]: Q3-4 minutes, non-palpable; EFM: 160bpm with accels to 185bpm    A  1. 29 yo  with IUP 30w0d by SLMP  2. Cat I FHR tracing   3. Back and RUQ pain  4. No prenatal care      P  1. Prenatal labs and full OB US ordered  2. Give GI cocktail for pain  3. RUQ US  4. Zofran PRN, LR IV  5. Admit to OBS after consultation with EZRA Workman

## 2020-09-01 NOTE — PROGRESS NOTES
Progress Note  30 y.o.  female at 30w1d , with C/O of upper Abdominal pain , N/V , and RUQ pan , post eating eggs . Patient with prior pain , after eating in past .Patient dx as   Subjective: Less epigastric pain , N/V under control     Objective Data:  Recent Labs     20  0426   WBC 11.0* 9.6   RBC 4.14* 3.79*   HEMOGLOBIN 11.7* 10.8*   HEMATOCRIT 36.9* 33.6*   MCV 89.1 88.7   MCH 28.3 28.5   MCHC 31.7* 32.1*   RDW 52.8* 52.1*   PLATELETCT 205 197   MPV 11.6 11.1     Recent Labs     20  0426   SODIUM 138 139   POTASSIUM 3.2* 3.7   CHLORIDE 102 104   CO2 19* 19*   GLUCOSE 116* 120*   BUN 7* 8   CREATININE 0.56 0.51   CALCIUM 9.0 8.5     Results for WILKINSONCARON MCFADDEN LOS S (MRN [id number]) as of 2020 16:35   Ref. Range 2020 04:26 2020 09:15 2020 14:40   AST(SGOT) Latest Ref Range: 12 - 45 U/L 25  24   ALT(SGPT) Latest Ref Range: 2 - 50 U/L 34  32   Alkaline Phosphatase Latest Ref Range: 30 - 99 U/L 132 (H)  120 (H)   Total Bilirubin Latest Ref Range: 0.1 - 1.5 mg/dL 1.4  2.0 (H)   Albumin Latest Ref Range: 3.2 - 4.9 g/dL 3.3  2.9 (L)   Total Protein Latest Ref Range: 6.0 - 8.2 g/dL 6.3  5.5 (L)   Globulin Latest Ref Range: 1.9 - 3.5 g/dL 3.0  2.6   A-G Ratio Latest Units: g/dL 1.1  1.1   Lipase Latest Ref Range: 11 - 82 U/L 996 (H)     Amylase Latest Ref Range: 20 - 103 U/L 1083 (H)       Vitals:    20 0700 20 0800 20 0809 20 1208   BP:   111/63 108/58   Pulse:   72 71   Resp:  20  18   Temp:  36.1 °C (97 °F)  36.7 °C (98 °F)   TempSrc: Temporal Temporal  Temporal   SpO2:       Weight:       Height:             Intake/Output Summary (Last 24 hours) at 2020 1541  Last data filed at 2020 1300  Gross per 24 hour   Intake --   Output 100 ml   Net -100 ml       Current Facility-Administered Medications   Medication Dose Route Frequency Provider Last Rate Last Dose   • morphine (pf) 4 MG/ML injection 4 mg  4 mg Intravenous Q4HRS  PRN Antoniia Gann, OGNP   4 mg at 09/01/20 0628   • famotidine (PEPCID) injection 20 mg  20 mg Intravenous BID PRN Antoniia Sanjuanita, OGNP   20 mg at 09/01/20 0945   • metoclopramide (REGLAN) injection 10 mg  10 mg Intravenous Q6HRS Isaac Ross M.D.   10 mg at 09/01/20 0939   • lactated ringer BOLUS infusion  400 mL Intravenous Once Mata Bowie M.D. 1,000 mL/hr at 09/01/20 0939     • acetaminophen (TYLENOL) tablet 650 mg  650 mg Oral Q4HRS PRN Mata Bowie M.D.   650 mg at 09/01/20 1046   • ondansetron (ZOFRAN) syringe/vial injection 4 mg  4 mg Intravenous Q6HRS PRN Jackie Gann, OGNP   4 mg at 09/01/20 0732   • lactated ringers infusion   Intravenous Continuous Mata Bowie M.D. 250 mL/hr at 09/01/20 1445     • hydrOXYzine HCl (ATARAX) tablet 50 mg  50 mg Oral TID PRN Jackie Gann, OGNP   50 mg at 09/01/20 0141       Assessment: 30w1d                          Gi disease : c/w mild pancreatitis , vs Cholecystitis                          Improving   Evaluation and clinical decision making , including analysis of Fetal data and maternal lab work completed over a 45 minutes period.     Plan: Continued Hydration            Strict I/O            [place]  Oral Hydration

## 2020-09-01 NOTE — H&P
Admission History and Physical      Sue Fraser is a 30 y.o. female  at 30w0d who presents for abdominal pain    Subjective:   Patient initially seen in OB triage for complaints of abdominal pain that has worsened in the day. It became most painful after she ate eggs this evening. She reports history of similar type of pain but does not remember when.   The pain is described as localized to left side and radiating from back to sternum. Anatomy US and RUQ US pending.    Her care is complicated by no prenatal care history.     ROS: Gastrointestinal: positive for abdominal pain, dyspepsia, reflux symptoms and vomiting    No past medical history on file.  Past Surgical History:   Procedure Laterality Date   • REPEAT C SECTION Bilateral 2019    Procedure:  SECTION, REPEAT;  Surgeon: Eulalio Frazier M.D.;  Location: LABOR AND DELIVERY;  Service: Labor and Delivery   • PRIMARY C SECTION  2015    Procedure: PRIMARY C SECTION;  Surgeon: Lindsey Stein M.D.;  Location: LABOR AND DELIVERY;  Service:    • DILATION AND CURETTAGE  3/14/2013    Performed by Kj Castrejon M.D. at [facility] ORS   • FOOT SURGERY      right foot surgery age 7     OB History    Para Term  AB Living   5 3 3   1 3   SAB TAB Ectopic Molar Multiple Live Births   1       0 3      # Outcome Date GA Lbr Nacho/2nd Weight Sex Delivery Anes PTL Lv   5 Current            4 Term 19 39w3d  3.91 kg (8 lb 9.9 oz) F CS-LTranv Spinal N ISAIAH   3 Term 12/05/15 39w5d  4.111 kg (9 lb 1 oz) M CS-Unspec Spinal N ISAIAH   2 SAB  10w1d    SAB         Birth Comments: Molar pregnancy with D&C      Complications: Molar pregnancy   1 Term 07 39w0d  3.572 kg (7 lb 14 oz) F Vag-Vacuum EPI N ISAIAH      Birth Comments: Pt. states no complications.      Social History     Socioeconomic History   • Marital status: Single     Spouse name: Not on file   • Number of children: Not on file   • Years of education:  Not on file   • Highest education level: Not on file   Occupational History   • Not on file   Social Needs   • Financial resource strain: Not on file   • Food insecurity     Worry: Not on file     Inability: Not on file   • Transportation needs     Medical: Not on file     Non-medical: Not on file   Tobacco Use   • Smoking status: Never Smoker   • Smokeless tobacco: Never Used   Substance and Sexual Activity   • Alcohol use: No   • Drug use: No   • Sexual activity: Never     Partners: Male     Birth control/protection: Condom     Comment: None   Lifestyle   • Physical activity     Days per week: Not on file     Minutes per session: Not on file   • Stress: Not on file   Relationships   • Social connections     Talks on phone: Not on file     Gets together: Not on file     Attends Sabianism service: Not on file     Active member of club or organization: Not on file     Attends meetings of clubs or organizations: Not on file     Relationship status: Not on file   • Intimate partner violence     Fear of current or ex partner: Not on file     Emotionally abused: Not on file     Physically abused: Not on file     Forced sexual activity: Not on file   Other Topics Concern   • Not on file   Social History Narrative   • Not on file     Allergies: Patient has no known allergies.    Current Facility-Administered Medications:   •  ondansetron (ZOFRAN) syringe/vial injection 4 mg, 4 mg, Intravenous, Q6HRS PRN, Carrissia Gann, OGNP, 4 mg at 20 2323  •  lactated ringers infusion, , Intravenous, Continuous, Carrissia Gann, OGNP, Last Rate: 125 mL/hr at 20 2324, 1,000 mL at 20 2324  •  morphine (pf) 4 MG/ML injection 4 mg, 4 mg, Intravenous, 4X/DAY PRN, Carrissia Gann, OGNP, 4 mg at 20 2336  •  hydrOXYzine HCl (ATARAX) tablet 50 mg, 50 mg, Oral, TID PRN, Carrissia Gann, OGNP    No prenatal care  Patient Active Problem List    Diagnosis Date Noted   • Delivery of pregnancy by  section  05/19/2019   • History of macrosomia in infant in prior pregnancy, currently pregnant 12/19/2018   • Hx of molar pregnancy, antepartum 06/02/2015       US today confirms pregnancy dating.        Objective:      /61   Pulse 69   Temp 35.9 °C (96.6 °F) (Temporal)   Resp 16   Ht 1.524 m (5')   Wt 73.9 kg (163 lb)   SpO2 98%     General:   alert, cooperative, appears weak   Skin:   normal   HEENT:  PERRLA   Lungs:   CTA bilateral   Heart:   S1, S2 normal, no murmur, click, rub or gallop, regular rate and rhythm, peripheral pulses very brisk, chest is clear without rales or wheezing, no pedal edema, no hepatosplenomegaly   Abdomen:   gravid, NT   EFW:  see US   Pelvis:  not assessed   FHT:  140 BPM   Uterine Size: S=D   Presentations: Cephalic   Cervix: Not assessed.                              Lab Review  Lab:   Blood type: O     Recent Results (from the past 5880 hour(s))   PRENATAL PANEL 3+HIV+HCV    Collection Time: 08/31/20  9:23 PM   Result Value Ref Range    WBC 11.0 (H) 4.8 - 10.8 K/uL    RBC 4.14 (L) 4.20 - 5.40 M/uL    Hemoglobin 11.7 (L) 12.0 - 16.0 g/dL    Hematocrit 36.9 (L) 37.0 - 47.0 %    MCV 89.1 81.4 - 97.8 fL    MCH 28.3 27.0 - 33.0 pg    MCHC 31.7 (L) 33.6 - 35.0 g/dL    RDW 52.8 (H) 35.9 - 50.0 fL    Platelet Count 205 164 - 446 K/uL    MPV 11.6 9.0 - 12.9 fL    Neutrophils-Polys 88.10 (H) 44.00 - 72.00 %    Lymphocytes 9.50 (L) 22.00 - 41.00 %    Monocytes 1.60 0.00 - 13.40 %    Eosinophils 0.00 0.00 - 6.90 %    Basophils 0.30 0.00 - 1.80 %    Immature Granulocytes 0.50 0.00 - 0.90 %    Nucleated RBC 0.00 /100 WBC    Neutrophils (Absolute) 9.65 (H) 2.00 - 7.15 K/uL    Lymphs (Absolute) 1.04 1.00 - 4.80 K/uL    Monos (Absolute) 0.18 0.00 - 0.85 K/uL    Eos (Absolute) 0.00 0.00 - 0.51 K/uL    Baso (Absolute) 0.03 0.00 - 0.12 K/uL    Immature Granulocytes (abs) 0.05 0.00 - 0.11 K/uL    NRBC (Absolute) 0.00 K/uL    Rubella IgG Antibody 96.90 IU/mL    Hepatitis B Surface Antigen Non-Reactive  Non-Reactive    Hepatitis C Antibody Non-Reactive Non-Reactive    Syphilis, Treponemal Qual Non-Reactive Non-Reactive   LIPASE    Collection Time: 20  9:23 PM   Result Value Ref Range    Lipase 2784 (H) 11 - 82 U/L   AMYLASE    Collection Time: 20  9:23 PM   Result Value Ref Range    Amylase 1949 (H) 20 - 103 U/L   Comp Metabolic Panel    Collection Time: 20  9:23 PM   Result Value Ref Range    Sodium 138 135 - 145 mmol/L    Potassium 3.2 (L) 3.6 - 5.5 mmol/L    Chloride 102 96 - 112 mmol/L    Co2 19 (L) 20 - 33 mmol/L    Anion Gap 17.0 (H) 7.0 - 16.0    Glucose 116 (H) 65 - 99 mg/dL    Bun 7 (L) 8 - 22 mg/dL    Creatinine 0.56 0.50 - 1.40 mg/dL    Calcium 9.0 8.5 - 10.5 mg/dL    AST(SGOT) 34 12 - 45 U/L    ALT(SGPT) 31 2 - 50 U/L    Alkaline Phosphatase 150 (H) 30 - 99 U/L    Total Bilirubin 1.4 0.1 - 1.5 mg/dL    Albumin 3.7 3.2 - 4.9 g/dL    Total Protein 7.0 6.0 - 8.2 g/dL    Globulin 3.3 1.9 - 3.5 g/dL    A-G Ratio 1.1 g/dL   HIV AG/AB COMBO ASSAY SCREENING    Collection Time: 20  9:23 PM   Result Value Ref Range    HIV Ag/Ab Combo Assay Non-Reactive Non Reactive   OP Prenatal Panel-Blood Bank    Collection Time: 20  9:23 PM   Result Value Ref Range    ABO Grouping Only O     Rh Grouping Only POS     Antibody Screen Scrn NEG    ESTIMATED GFR    Collection Time: 20  9:23 PM   Result Value Ref Range    GFR If African American >60 >60 mL/min/1.73 m 2    GFR If Non African American >60 >60 mL/min/1.73 m 2          Assessment:   Sue Fraser at 30w0d  1. Cholecystitis  2. No prenatal care  3. History of  section    Plan:     1. Admit to L&D for observation.   2. GI cocktail, IV pain medication, IV antiemetic, IV hydration  3. NST q shift as reactive at current with no fetal concerns  4. Educated patient on observation. Plan to start PPI and Actigall in AM with repeat of labs. Consider GI consult to rule out pancreatitis if warranted.       Ron BAUER/   Naina Clay

## 2020-09-01 NOTE — PROGRESS NOTES
Late Entry:    2230: Received report from JULI Galloway RN. POc discussed. Pt transferred to ante for overnight OBS.    2300: Discussed POC with pt. IV started and labs sent.    2330: JUANCARLOS Quick at bedside to discuss POC with pt. New orders received.

## 2020-09-01 NOTE — PROGRESS NOTES
lmp 2/3/20  30 weeks    Complaints: epigastric pain    Hx: c/s x2    2000: pt to labor and delivery c/o severe epigastric pain starting this morning that has become progressively worse throughout the day.  Pt states she has vomited multiple times. She ate cereal with milk this morning and eggs at 1600. Pt reports fetal movement. Pt denies vaginal bleeding, leaking, contractions or cramping.  Pt states she has not been seen by [facility] yet, but has an appointment.  Pt denies complications with her last pregnancies or deliveries.  Vss.  Report to mai alegria cnm.  Orders received.      : us at bs for complete ob ultrasound.      : cnm at bs, discuss poc.  Us at bs for gallbladder.      : pt feeling better after the gi cocktail.  Orders for overnight obs for gallstones.  Report to loyd edwards rn

## 2020-09-02 ENCOUNTER — APPOINTMENT (OUTPATIENT)
Dept: RADIOLOGY | Facility: MEDICAL CENTER | Age: 30
DRG: 831 | End: 2020-09-02
Attending: OBSTETRICS & GYNECOLOGY

## 2020-09-02 PROBLEM — U07.1 COVID-19 VIRUS DETECTED: Status: ACTIVE | Noted: 2020-09-02

## 2020-09-02 LAB
ALBUMIN SERPL BCP-MCNC: 2.9 G/DL (ref 3.2–4.9)
ALBUMIN SERPL BCP-MCNC: 3 G/DL (ref 3.2–4.9)
ALBUMIN/GLOB SERPL: 1.3 G/DL
ALBUMIN/GLOB SERPL: 1.3 G/DL
ALP SERPL-CCNC: 125 U/L (ref 30–99)
ALP SERPL-CCNC: 125 U/L (ref 30–99)
ALT SERPL-CCNC: 45 U/L (ref 2–50)
ALT SERPL-CCNC: 46 U/L (ref 2–50)
AMYLASE SERPL-CCNC: 383 U/L (ref 20–103)
ANION GAP SERPL CALC-SCNC: 11 MMOL/L (ref 7–16)
ANION GAP SERPL CALC-SCNC: 11 MMOL/L (ref 7–16)
APTT PPP: 23.5 SEC (ref 24.7–36)
AST SERPL-CCNC: 25 U/L (ref 12–45)
AST SERPL-CCNC: 30 U/L (ref 12–45)
BASOPHILS # BLD AUTO: 0.1 % (ref 0–1.8)
BASOPHILS # BLD: 0.01 K/UL (ref 0–0.12)
BILIRUB CONJ SERPL-MCNC: 2.2 MG/DL (ref 0.1–0.5)
BILIRUB INDIRECT SERPL-MCNC: 0.4 MG/DL (ref 0–1)
BILIRUB SERPL-MCNC: 0.8 MG/DL (ref 0.1–1.5)
BILIRUB SERPL-MCNC: 2.6 MG/DL (ref 0.1–1.5)
BUN SERPL-MCNC: 3 MG/DL (ref 8–22)
BUN SERPL-MCNC: 4 MG/DL (ref 8–22)
CALCIUM SERPL-MCNC: 8.1 MG/DL (ref 8.5–10.5)
CALCIUM SERPL-MCNC: 8.6 MG/DL (ref 8.5–10.5)
CHLORIDE SERPL-SCNC: 105 MMOL/L (ref 96–112)
CHLORIDE SERPL-SCNC: 108 MMOL/L (ref 96–112)
CO2 SERPL-SCNC: 20 MMOL/L (ref 20–33)
CO2 SERPL-SCNC: 21 MMOL/L (ref 20–33)
CREAT SERPL-MCNC: 0.37 MG/DL (ref 0.5–1.4)
CREAT SERPL-MCNC: 0.46 MG/DL (ref 0.5–1.4)
EOSINOPHIL # BLD AUTO: 0.01 K/UL (ref 0–0.51)
EOSINOPHIL NFR BLD: 0.1 % (ref 0–6.9)
ERYTHROCYTE [DISTWIDTH] IN BLOOD BY AUTOMATED COUNT: 50.5 FL (ref 35.9–50)
FIBRINOGEN PPP-MCNC: 404 MG/DL (ref 215–460)
GLOBULIN SER CALC-MCNC: 2.2 G/DL (ref 1.9–3.5)
GLOBULIN SER CALC-MCNC: 2.4 G/DL (ref 1.9–3.5)
GLUCOSE SERPL-MCNC: 98 MG/DL (ref 65–99)
GLUCOSE SERPL-MCNC: 99 MG/DL (ref 65–99)
HCT VFR BLD AUTO: 30.5 % (ref 37–47)
HGB BLD-MCNC: 9.8 G/DL (ref 12–16)
IMM GRANULOCYTES # BLD AUTO: 0.06 K/UL (ref 0–0.11)
IMM GRANULOCYTES NFR BLD AUTO: 0.8 % (ref 0–0.9)
INR PPP: 0.95 (ref 0.87–1.13)
LIPASE SERPL-CCNC: 251 U/L (ref 11–82)
LYMPHOCYTES # BLD AUTO: 1.36 K/UL (ref 1–4.8)
LYMPHOCYTES NFR BLD: 18.3 % (ref 22–41)
MCH RBC QN AUTO: 27.7 PG (ref 27–33)
MCHC RBC AUTO-ENTMCNC: 32.1 G/DL (ref 33.6–35)
MCV RBC AUTO: 86.2 FL (ref 81.4–97.8)
MONOCYTES # BLD AUTO: 0.35 K/UL (ref 0–0.85)
MONOCYTES NFR BLD AUTO: 4.7 % (ref 0–13.4)
NEUTROPHILS # BLD AUTO: 5.63 K/UL (ref 2–7.15)
NEUTROPHILS NFR BLD: 76 % (ref 44–72)
NRBC # BLD AUTO: 0 K/UL
NRBC BLD-RTO: 0 /100 WBC
PLATELET # BLD AUTO: 179 K/UL (ref 164–446)
PMV BLD AUTO: 11.8 FL (ref 9–12.9)
POTASSIUM SERPL-SCNC: 3.7 MMOL/L (ref 3.6–5.5)
POTASSIUM SERPL-SCNC: 4 MMOL/L (ref 3.6–5.5)
PROT SERPL-MCNC: 5.1 G/DL (ref 6–8.2)
PROT SERPL-MCNC: 5.4 G/DL (ref 6–8.2)
PROTHROMBIN TIME: 13 SEC (ref 12–14.6)
RBC # BLD AUTO: 3.54 M/UL (ref 4.2–5.4)
SODIUM SERPL-SCNC: 136 MMOL/L (ref 135–145)
SODIUM SERPL-SCNC: 140 MMOL/L (ref 135–145)
URATE SERPL-MCNC: 4 MG/DL (ref 1.9–8.2)
WBC # BLD AUTO: 7.4 K/UL (ref 4.8–10.8)

## 2020-09-02 PROCEDURE — 700105 HCHG RX REV CODE 258: Performed by: OBSTETRICS & GYNECOLOGY

## 2020-09-02 PROCEDURE — 36415 COLL VENOUS BLD VENIPUNCTURE: CPT

## 2020-09-02 PROCEDURE — A9270 NON-COVERED ITEM OR SERVICE: HCPCS | Performed by: OBSTETRICS & GYNECOLOGY

## 2020-09-02 PROCEDURE — 82150 ASSAY OF AMYLASE: CPT

## 2020-09-02 PROCEDURE — 99232 SBSQ HOSP IP/OBS MODERATE 35: CPT | Performed by: OBSTETRICS & GYNECOLOGY

## 2020-09-02 PROCEDURE — 85025 COMPLETE CBC W/AUTO DIFF WBC: CPT

## 2020-09-02 PROCEDURE — 74181 MRI ABDOMEN W/O CONTRAST: CPT

## 2020-09-02 PROCEDURE — 80053 COMPREHEN METABOLIC PANEL: CPT | Mod: 91

## 2020-09-02 PROCEDURE — 700102 HCHG RX REV CODE 250 W/ 637 OVERRIDE(OP): Performed by: OBSTETRICS & GYNECOLOGY

## 2020-09-02 PROCEDURE — 700111 HCHG RX REV CODE 636 W/ 250 OVERRIDE (IP): Performed by: STUDENT IN AN ORGANIZED HEALTH CARE EDUCATION/TRAINING PROGRAM

## 2020-09-02 PROCEDURE — 83690 ASSAY OF LIPASE: CPT

## 2020-09-02 PROCEDURE — 700111 HCHG RX REV CODE 636 W/ 250 OVERRIDE (IP): Performed by: ADVANCED PRACTICE MIDWIFE

## 2020-09-02 PROCEDURE — 59025 FETAL NON-STRESS TEST: CPT

## 2020-09-02 PROCEDURE — 85730 THROMBOPLASTIN TIME PARTIAL: CPT

## 2020-09-02 PROCEDURE — 770002 HCHG ROOM/CARE - OB PRIVATE (112)

## 2020-09-02 PROCEDURE — 85610 PROTHROMBIN TIME: CPT

## 2020-09-02 PROCEDURE — 700111 HCHG RX REV CODE 636 W/ 250 OVERRIDE (IP): Performed by: OBSTETRICS & GYNECOLOGY

## 2020-09-02 PROCEDURE — 84550 ASSAY OF BLOOD/URIC ACID: CPT

## 2020-09-02 PROCEDURE — 85384 FIBRINOGEN ACTIVITY: CPT

## 2020-09-02 PROCEDURE — 99233 SBSQ HOSP IP/OBS HIGH 50: CPT | Performed by: HOSPITALIST

## 2020-09-02 PROCEDURE — 82248 BILIRUBIN DIRECT: CPT

## 2020-09-02 PROCEDURE — 302790 HCHG STAT ANTEPARTUM CARE, DAILY

## 2020-09-02 RX ORDER — DOCUSATE SODIUM 100 MG/1
100 CAPSULE, LIQUID FILLED ORAL 2 TIMES DAILY
Status: DISCONTINUED | OUTPATIENT
Start: 2020-09-02 | End: 2020-09-06 | Stop reason: HOSPADM

## 2020-09-02 RX ORDER — METOCLOPRAMIDE HYDROCHLORIDE 5 MG/ML
10 INJECTION INTRAMUSCULAR; INTRAVENOUS EVERY 6 HOURS PRN
Status: DISCONTINUED | OUTPATIENT
Start: 2020-09-02 | End: 2020-09-06 | Stop reason: HOSPADM

## 2020-09-02 RX ADMIN — SODIUM CHLORIDE, POTASSIUM CHLORIDE, SODIUM LACTATE AND CALCIUM CHLORIDE: 600; 310; 30; 20 INJECTION, SOLUTION INTRAVENOUS at 15:59

## 2020-09-02 RX ADMIN — DOCUSATE SODIUM 100 MG: 100 CAPSULE ORAL at 10:02

## 2020-09-02 RX ADMIN — FAMOTIDINE 20 MG: 10 INJECTION INTRAVENOUS at 02:24

## 2020-09-02 RX ADMIN — SODIUM CHLORIDE, POTASSIUM CHLORIDE, SODIUM LACTATE AND CALCIUM CHLORIDE: 600; 310; 30; 20 INJECTION, SOLUTION INTRAVENOUS at 02:13

## 2020-09-02 RX ADMIN — METOCLOPRAMIDE 10 MG: 5 INJECTION, SOLUTION INTRAMUSCULAR; INTRAVENOUS at 14:57

## 2020-09-02 RX ADMIN — METOCLOPRAMIDE 10 MG: 5 INJECTION, SOLUTION INTRAMUSCULAR; INTRAVENOUS at 02:12

## 2020-09-02 RX ADMIN — SODIUM CHLORIDE, POTASSIUM CHLORIDE, SODIUM LACTATE AND CALCIUM CHLORIDE: 600; 310; 30; 20 INJECTION, SOLUTION INTRAVENOUS at 23:28

## 2020-09-02 RX ADMIN — METOCLOPRAMIDE 10 MG: 5 INJECTION, SOLUTION INTRAMUSCULAR; INTRAVENOUS at 08:06

## 2020-09-02 RX ADMIN — FAMOTIDINE 20 MG: 10 INJECTION INTRAVENOUS at 12:11

## 2020-09-02 RX ADMIN — SODIUM CHLORIDE, POTASSIUM CHLORIDE, SODIUM LACTATE AND CALCIUM CHLORIDE: 600; 310; 30; 20 INJECTION, SOLUTION INTRAVENOUS at 08:40

## 2020-09-02 RX ADMIN — ACETAMINOPHEN 650 MG: 325 TABLET, FILM COATED ORAL at 02:17

## 2020-09-02 ASSESSMENT — ENCOUNTER SYMPTOMS
DIARRHEA: 0
NAUSEA: 0
NERVOUS/ANXIOUS: 0
HEADACHES: 0
BACK PAIN: 0
VOMITING: 0
ABDOMINAL PAIN: 1
FEVER: 0
SPEECH CHANGE: 0
CHILLS: 0
SHORTNESS OF BREATH: 0
SORE THROAT: 0
COUGH: 0
STRIDOR: 0

## 2020-09-02 NOTE — PROGRESS NOTES
0700 Report received, pt care assumed.   0820 Pt resting in bed. Denies pain or nausea at this time. Pt denies u/c's, LOF, or VB, reports +FM.   0937 MRI consent obtained. Pt denies any metal or magnets in her body. Two previous  sections no other surgeries per pt.   1300 Dr. Romero, hospitalist in to see pt, call him to let him know MRI is complete.   1850 Transport at bedside to take pt to MRI, Pt ambulated to wheelchair for transport, IV saline locked for transport.  190 Report to BRIAN Goyal, RN

## 2020-09-02 NOTE — PROGRESS NOTES
Called by nurse with : Decreased UOP   30 y.o. , at 30w1d , admitted for N/V , abominal pain , and possible cholecystitis , but with normal  WBC  And afebrile state , with coexistent : elevated amylase/Lipase , with epigastric pain ; Pancreatitis considered.   Patient with improvement , after hydration , in these levels , but still some persistent  Vomiting .   Patient after fluid resuscitation , still with UOP < 30 cc / hr ,     Vitals:    20 0800 20 0809 20 1208 20 1631   BP:  111/63 108/58 116/60   Pulse:  72 71 75   Resp: 20  18 18   Temp: 36.1 °C (97 °F)  36.7 °C (98 °F) 36.2 °C (97.2 °F)   TempSrc: Temporal  Temporal Temporal   SpO2:       Weight:       Height:           Intake/Output Summary (Last 24 hours) at 2020 1829  Last data filed at 2020 1826  Gross per 24 hour   Intake 2400 ml   Output 270 ml   Net 2130 ml       Labs :     Recent Labs     20  2123 20  0426 20  1440   SODIUM 138 139 134*   POTASSIUM 3.2* 3.7 3.4*   CHLORIDE 102 104 102   CO2 19* 19* 20   GLUCOSE 116* 120* 102*   BUN 7* 8 6*     Results for LOS CORTES (MRN [id number]) as of 2020 18:10   Ref. Range 2020 14:40   Creatinine Latest Ref Range: 0.50 - 1.40 mg/dL 0.38 (L)   GFR If  Latest Ref Range: >60 mL/min/1.73 m 2 >60   GFR If Non  Latest Ref Range: >60 mL/min/1.73 m 2 >60   Calcium Latest Ref Range: 8.5 - 10.5 mg/dL 8.0 (L)   AST(SGOT) Latest Ref Range: 12 - 45 U/L 24   ALT(SGPT) Latest Ref Range: 2 - 50 U/L 32   Alkaline Phosphatase Latest Ref Range: 30 - 99 U/L 120 (H)   Total Bilirubin Latest Ref Range: 0.1 - 1.5 mg/dL 2.0 (H)   Albumin Latest Ref Range: 3.2 - 4.9 g/dL 2.9 (L)   Total Protein Latest Ref Range: 6.0 - 8.2 g/dL 5.5 (L)   Globulin Latest Ref Range: 1.9 - 3.5 g/dL 2.6   A-G Ratio Latest Units: g/dL 1.1       Ass:   30w1d  Pancreatitis ,   GB disease : mobile Stone   Hyperbilirubinemia   Relative oliguria   Evaluation  and clinical decision making , including analysis of Fetal data and maternal lab work completed over a 30 minutes period.     P.   Need urinometer   Hydrate at 150 cc / hr : prior boluses on board   Contact Hospitalist  service ., for co management   coagulation profile

## 2020-09-02 NOTE — PROGRESS NOTES
Consult from OB for pancreatitis  She is 30 W pregnant admitted for N/V/AP  Elevated lipase/amylase, liver enzymes  Ultrasound showed dilated cbd  Will need MRCP  Now patient has low urine output.      Dr. Marjan Perez to consult.

## 2020-09-02 NOTE — PROGRESS NOTES
Sue Fraser   30w2d  Admission DX: Pregnancy  Indication for care in labor and delivery, antepartum    Date of Admission: 2020  Patient Active Problem List    Diagnosis Date Noted   • 30 weeks gestation of pregnancy 2020   • Acute biliary pancreatitis without infection or necrosis 2020   • Hypokalemia 2020   • Delivery of pregnancy by  section 2019   • History of macrosomia in infant in prior pregnancy, currently pregnant 2018   • Hx of molar pregnancy, antepartum 2015       Subjective:   uterine contractions:no  pain: .no  LOF: no  vaginal Bleeding: no  fetal movement: normal  LUQ and epigastric pain has improved.  No fundal tenderness.  No back or flank pain.  Nausea improved.  Overall doing better     Objective:   Vitals:    20 0939 20 1212 20 1300 20 1454   BP: (!) 92/53  102/55 100/59   Pulse: 86  (!) 104 96   Resp:  17  18   Temp:  36.6 °C (97.8 °F)  36.6 °C (97.8 °F)   TempSrc:  Temporal  Temporal   SpO2:       Weight:       Height:         NST: Cat I  [place]: intermittent, pt does not feel  Gen: NAD  Abdomen: gravid, soft, NT, No RUQ or LUQ or epigastric pain  Ext: SCDs on, Nt, no cyanosis or clubbing    Meds:     Current Facility-Administered Medications:   •  docusate sodium (COLACE) capsule 100 mg, 100 mg, Oral, BID, Marylou Ruano D.O., 100 mg at 20 1002  •  enoxaparin (LOVENOX) inj 40 mg, 40 mg, Subcutaneous, DAILY, Marylou Ruano, D.O.  •  metoclopramide (REGLAN) injection 10 mg, 10 mg, Intravenous, Q6HRS PRN, Marylou Ruano D.O., 10 mg at 20 1457  •  morphine (pf) 4 MG/ML injection 4 mg, 4 mg, Intravenous, Q4HRS PRN, SHARI DrakeNP, 4 mg at 20 0628  •  famotidine (PEPCID) injection 20 mg, 20 mg, Intravenous, BID PRN, ANN Drake, 20 mg at 20 1211  •  acetaminophen (TYLENOL) tablet 650 mg, 650 mg, Oral, Q4HRS PRN, Mata Bowie M.D., 650 mg at 20 0217  •   promethazine (PHENERGAN) tablet 25 mg, 25 mg, Oral, Q6HRS PRN, Mata Bowie M.D.  •  ondansetron (ZOFRAN) syringe/vial injection 4 mg, 4 mg, Intravenous, Q6HRS PRN, Jaquelinssia Gann, OGNP, 4 mg at 09/01/20 0732  •  lactated ringers infusion, , Intravenous, Continuous, Mata Bowie M.D., Last Rate: 150 mL/hr at 09/02/20 0840  •  hydrOXYzine HCl (ATARAX) tablet 50 mg, 50 mg, Oral, TID PRN, Jaquelinssia Gann, OGNP, 50 mg at 09/01/20 0141    Labs:    Lab:   Recent Results (from the past 72 hour(s))   PRENATAL PANEL 3+HIV+HCV    Collection Time: 08/31/20  9:23 PM   Result Value Ref Range    WBC 11.0 (H) 4.8 - 10.8 K/uL    RBC 4.14 (L) 4.20 - 5.40 M/uL    Hemoglobin 11.7 (L) 12.0 - 16.0 g/dL    Hematocrit 36.9 (L) 37.0 - 47.0 %    MCV 89.1 81.4 - 97.8 fL    MCH 28.3 27.0 - 33.0 pg    MCHC 31.7 (L) 33.6 - 35.0 g/dL    RDW 52.8 (H) 35.9 - 50.0 fL    Platelet Count 205 164 - 446 K/uL    MPV 11.6 9.0 - 12.9 fL    Neutrophils-Polys 88.10 (H) 44.00 - 72.00 %    Lymphocytes 9.50 (L) 22.00 - 41.00 %    Monocytes 1.60 0.00 - 13.40 %    Eosinophils 0.00 0.00 - 6.90 %    Basophils 0.30 0.00 - 1.80 %    Immature Granulocytes 0.50 0.00 - 0.90 %    Nucleated RBC 0.00 /100 WBC    Neutrophils (Absolute) 9.65 (H) 2.00 - 7.15 K/uL    Lymphs (Absolute) 1.04 1.00 - 4.80 K/uL    Monos (Absolute) 0.18 0.00 - 0.85 K/uL    Eos (Absolute) 0.00 0.00 - 0.51 K/uL    Baso (Absolute) 0.03 0.00 - 0.12 K/uL    Immature Granulocytes (abs) 0.05 0.00 - 0.11 K/uL    NRBC (Absolute) 0.00 K/uL    Rubella IgG Antibody 96.90 IU/mL    Hepatitis B Surface Antigen Non-Reactive Non-Reactive    Hepatitis C Antibody Non-Reactive Non-Reactive    Syphilis, Treponemal Qual Non-Reactive Non-Reactive   LIPASE    Collection Time: 08/31/20  9:23 PM   Result Value Ref Range    Lipase 2784 (H) 11 - 82 U/L   AMYLASE    Collection Time: 08/31/20  9:23 PM   Result Value Ref Range    Amylase 1949 (H) 20 - 103 U/L   Comp Metabolic Panel    Collection Time: 08/31/20   9:23 PM   Result Value Ref Range    Sodium 138 135 - 145 mmol/L    Potassium 3.2 (L) 3.6 - 5.5 mmol/L    Chloride 102 96 - 112 mmol/L    Co2 19 (L) 20 - 33 mmol/L    Anion Gap 17.0 (H) 7.0 - 16.0    Glucose 116 (H) 65 - 99 mg/dL    Bun 7 (L) 8 - 22 mg/dL    Creatinine 0.56 0.50 - 1.40 mg/dL    Calcium 9.0 8.5 - 10.5 mg/dL    AST(SGOT) 34 12 - 45 U/L    ALT(SGPT) 31 2 - 50 U/L    Alkaline Phosphatase 150 (H) 30 - 99 U/L    Total Bilirubin 1.4 0.1 - 1.5 mg/dL    Albumin 3.7 3.2 - 4.9 g/dL    Total Protein 7.0 6.0 - 8.2 g/dL    Globulin 3.3 1.9 - 3.5 g/dL    A-G Ratio 1.1 g/dL   HIV AG/AB COMBO ASSAY SCREENING    Collection Time: 08/31/20  9:23 PM   Result Value Ref Range    HIV Ag/Ab Combo Assay Non-Reactive Non Reactive   OP Prenatal Panel-Blood Bank    Collection Time: 08/31/20  9:23 PM   Result Value Ref Range    ABO Grouping Only O     Rh Grouping Only POS     Antibody Screen Scrn NEG    ESTIMATED GFR    Collection Time: 08/31/20  9:23 PM   Result Value Ref Range    GFR If African American >60 >60 mL/min/1.73 m 2    GFR If Non African American >60 >60 mL/min/1.73 m 2   CBC WITH DIFFERENTIAL    Collection Time: 09/01/20  4:26 AM   Result Value Ref Range    WBC 9.6 4.8 - 10.8 K/uL    RBC 3.79 (L) 4.20 - 5.40 M/uL    Hemoglobin 10.8 (L) 12.0 - 16.0 g/dL    Hematocrit 33.6 (L) 37.0 - 47.0 %    MCV 88.7 81.4 - 97.8 fL    MCH 28.5 27.0 - 33.0 pg    MCHC 32.1 (L) 33.6 - 35.0 g/dL    RDW 52.1 (H) 35.9 - 50.0 fL    Platelet Count 197 164 - 446 K/uL    MPV 11.1 9.0 - 12.9 fL    Neutrophils-Polys 86.60 (H) 44.00 - 72.00 %    Lymphocytes 10.60 (L) 22.00 - 41.00 %    Monocytes 2.00 0.00 - 13.40 %    Eosinophils 0.00 0.00 - 6.90 %    Basophils 0.20 0.00 - 1.80 %    Immature Granulocytes 0.60 0.00 - 0.90 %    Nucleated RBC 0.00 /100 WBC    Neutrophils (Absolute) 8.35 (H) 2.00 - 7.15 K/uL    Lymphs (Absolute) 1.02 1.00 - 4.80 K/uL    Monos (Absolute) 0.19 0.00 - 0.85 K/uL    Eos (Absolute) 0.00 0.00 - 0.51 K/uL    Baso (Absolute)  0.02 0.00 - 0.12 K/uL    Immature Granulocytes (abs) 0.06 0.00 - 0.11 K/uL    NRBC (Absolute) 0.00 K/uL   Comp Metabolic Panel    Collection Time: 09/01/20  4:26 AM   Result Value Ref Range    Sodium 139 135 - 145 mmol/L    Potassium 3.7 3.6 - 5.5 mmol/L    Chloride 104 96 - 112 mmol/L    Co2 19 (L) 20 - 33 mmol/L    Anion Gap 16.0 7.0 - 16.0    Glucose 120 (H) 65 - 99 mg/dL    Bun 8 8 - 22 mg/dL    Creatinine 0.51 0.50 - 1.40 mg/dL    Calcium 8.5 8.5 - 10.5 mg/dL    AST(SGOT) 25 12 - 45 U/L    ALT(SGPT) 34 2 - 50 U/L    Alkaline Phosphatase 132 (H) 30 - 99 U/L    Total Bilirubin 1.4 0.1 - 1.5 mg/dL    Albumin 3.3 3.2 - 4.9 g/dL    Total Protein 6.3 6.0 - 8.2 g/dL    Globulin 3.0 1.9 - 3.5 g/dL    A-G Ratio 1.1 g/dL   AMYLASE    Collection Time: 09/01/20  4:26 AM   Result Value Ref Range    Amylase 1083 (H) 20 - 103 U/L   LIPASE    Collection Time: 09/01/20  4:26 AM   Result Value Ref Range    Lipase 996 (H) 11 - 82 U/L   ESTIMATED GFR    Collection Time: 09/01/20  4:26 AM   Result Value Ref Range    GFR If African American >60 >60 mL/min/1.73 m 2    GFR If Non African American >60 >60 mL/min/1.73 m 2   Chlamydia/GC PCR Urine Or Swab    Collection Time: 09/01/20  9:15 AM    Specimen: Urine, First Catch; Genital   Result Value Ref Range    Source Other    URINALYSIS    Collection Time: 09/01/20  9:15 AM    Specimen: Urine, Clean Catch   Result Value Ref Range    Color DK Yellow     Character Clear     Specific Gravity 1.034 <1.035    Ph 6.0 5.0 - 8.0    Glucose Negative Negative mg/dL    Ketones >=160 Negative mg/dL    Protein 100 (A) Negative mg/dL    Bilirubin Moderate (A) Negative    Urobilinogen, Urine 1.0 Negative    Nitrite Positive (A) Negative    Leukocyte Esterase Negative Negative    Occult Blood Negative Negative    Micro Urine Req Microscopic    URINE MICROSCOPIC (W/UA)    Collection Time: 09/01/20  9:15 AM   Result Value Ref Range    WBC 0-2 /hpf    RBC 2-5 (A) /hpf    Bacteria Negative None /hpf     Epithelial Cells Few /hpf    Hyaline Cast 0-2 /lpf   Comp Metabolic Panel    Collection Time: 09/01/20  2:40 PM   Result Value Ref Range    Sodium 134 (L) 135 - 145 mmol/L    Potassium 3.4 (L) 3.6 - 5.5 mmol/L    Chloride 102 96 - 112 mmol/L    Co2 20 20 - 33 mmol/L    Anion Gap 12.0 7.0 - 16.0    Glucose 102 (H) 65 - 99 mg/dL    Bun 6 (L) 8 - 22 mg/dL    Creatinine 0.38 (L) 0.50 - 1.40 mg/dL    Calcium 8.0 (L) 8.5 - 10.5 mg/dL    AST(SGOT) 24 12 - 45 U/L    ALT(SGPT) 32 2 - 50 U/L    Alkaline Phosphatase 120 (H) 30 - 99 U/L    Total Bilirubin 2.0 (H) 0.1 - 1.5 mg/dL    Albumin 2.9 (L) 3.2 - 4.9 g/dL    Total Protein 5.5 (L) 6.0 - 8.2 g/dL    Globulin 2.6 1.9 - 3.5 g/dL    A-G Ratio 1.1 g/dL   ESTIMATED GFR    Collection Time: 09/01/20  2:40 PM   Result Value Ref Range    GFR If African American >60 >60 mL/min/1.73 m 2    GFR If Non African American >60 >60 mL/min/1.73 m 2   APTT    Collection Time: 09/01/20  6:12 PM   Result Value Ref Range    APTT 28.8 24.7 - 36.0 sec   FIBRINOGEN    Collection Time: 09/01/20  6:12 PM   Result Value Ref Range    Fibrinogen 392 215 - 460 mg/dL   Prothrombin Time    Collection Time: 09/01/20  6:12 PM   Result Value Ref Range    PT 13.7 12.0 - 14.6 sec    INR 1.02 0.87 - 1.13   CBC WITH DIFFERENTIAL    Collection Time: 09/01/20  6:12 PM   Result Value Ref Range    WBC 9.8 4.8 - 10.8 K/uL    RBC 3.43 (L) 4.20 - 5.40 M/uL    Hemoglobin 9.7 (L) 12.0 - 16.0 g/dL    Hematocrit 30.1 (L) 37.0 - 47.0 %    MCV 87.8 81.4 - 97.8 fL    MCH 28.3 27.0 - 33.0 pg    MCHC 32.2 (L) 33.6 - 35.0 g/dL    RDW 50.6 (H) 35.9 - 50.0 fL    Platelet Count 173 164 - 446 K/uL    MPV 11.1 9.0 - 12.9 fL    Neutrophils-Polys 83.70 (H) 44.00 - 72.00 %    Lymphocytes 11.80 (L) 22.00 - 41.00 %    Monocytes 3.80 0.00 - 13.40 %    Eosinophils 0.00 0.00 - 6.90 %    Basophils 0.10 0.00 - 1.80 %    Immature Granulocytes 0.60 0.00 - 0.90 %    Nucleated RBC 0.00 /100 WBC    Neutrophils (Absolute) 8.18 (H) 2.00 - 7.15 K/uL     Lymphs (Absolute) 1.15 1.00 - 4.80 K/uL    Monos (Absolute) 0.37 0.00 - 0.85 K/uL    Eos (Absolute) 0.00 0.00 - 0.51 K/uL    Baso (Absolute) 0.01 0.00 - 0.12 K/uL    Immature Granulocytes (abs) 0.06 0.00 - 0.11 K/uL    NRBC (Absolute) 0.00 K/uL   URIC ACID    Collection Time: 09/01/20  6:12 PM   Result Value Ref Range    Uric Acid 4.0 1.9 - 8.2 mg/dL   COVID/SARS CoV-2 PCR    Collection Time: 09/01/20  7:28 PM    Specimen: Nasopharyngeal; Respirate   Result Value Ref Range    COVID Order Status Received    SARS-CoV-2, PCR (In-House)    Collection Time: 09/01/20  7:28 PM   Result Value Ref Range    SARS-CoV-2 Source NP Swab     SARS-CoV-2 (RdRp gene) DETECTED (AA)    Comp Metabolic Panel    Collection Time: 09/02/20  5:35 AM   Result Value Ref Range    Sodium 136 135 - 145 mmol/L    Potassium 4.0 3.6 - 5.5 mmol/L    Chloride 105 96 - 112 mmol/L    Co2 20 20 - 33 mmol/L    Anion Gap 11.0 7.0 - 16.0    Glucose 98 65 - 99 mg/dL    Bun 4 (L) 8 - 22 mg/dL    Creatinine 0.37 (L) 0.50 - 1.40 mg/dL    Calcium 8.1 (L) 8.5 - 10.5 mg/dL    AST(SGOT) 30 12 - 45 U/L    ALT(SGPT) 45 2 - 50 U/L    Alkaline Phosphatase 125 (H) 30 - 99 U/L    Total Bilirubin 2.6 (H) 0.1 - 1.5 mg/dL    Albumin 2.9 (L) 3.2 - 4.9 g/dL    Total Protein 5.1 (L) 6.0 - 8.2 g/dL    Globulin 2.2 1.9 - 3.5 g/dL    A-G Ratio 1.3 g/dL   BILIRUBIN DIRECT    Collection Time: 09/02/20  5:35 AM   Result Value Ref Range    Direct Bilirubin 2.2 (H) 0.1 - 0.5 mg/dL   BILIRUBIN INDIRECT    Collection Time: 09/02/20  5:35 AM   Result Value Ref Range    Indirect Bilirubin 0.4 0.0 - 1.0 mg/dL   ESTIMATED GFR    Collection Time: 09/02/20  5:35 AM   Result Value Ref Range    GFR If African American >60 >60 mL/min/1.73 m 2    GFR If Non African American >60 >60 mL/min/1.73 m 2   LIPASE    Collection Time: 09/02/20 10:18 AM   Result Value Ref Range    Lipase 251 (H) 11 - 82 U/L   AMYLASE    Collection Time: 09/02/20 10:18 AM   Result Value Ref Range    Amylase 383 (H) 20 - 103  U/L   CBC WITH DIFFERENTIAL    Collection Time: 20 10:18 AM   Result Value Ref Range    WBC 7.4 4.8 - 10.8 K/uL    RBC 3.54 (L) 4.20 - 5.40 M/uL    Hemoglobin 9.8 (L) 12.0 - 16.0 g/dL    Hematocrit 30.5 (L) 37.0 - 47.0 %    MCV 86.2 81.4 - 97.8 fL    MCH 27.7 27.0 - 33.0 pg    MCHC 32.1 (L) 33.6 - 35.0 g/dL    RDW 50.5 (H) 35.9 - 50.0 fL    Platelet Count 179 164 - 446 K/uL    MPV 11.8 9.0 - 12.9 fL    Neutrophils-Polys 76.00 (H) 44.00 - 72.00 %    Lymphocytes 18.30 (L) 22.00 - 41.00 %    Monocytes 4.70 0.00 - 13.40 %    Eosinophils 0.10 0.00 - 6.90 %    Basophils 0.10 0.00 - 1.80 %    Immature Granulocytes 0.80 0.00 - 0.90 %    Nucleated RBC 0.00 /100 WBC    Neutrophils (Absolute) 5.63 2.00 - 7.15 K/uL    Lymphs (Absolute) 1.36 1.00 - 4.80 K/uL    Monos (Absolute) 0.35 0.00 - 0.85 K/uL    Eos (Absolute) 0.01 0.00 - 0.51 K/uL    Baso (Absolute) 0.01 0.00 - 0.12 K/uL    Immature Granulocytes (abs) 0.06 0.00 - 0.11 K/uL    NRBC (Absolute) 0.00 K/uL   URIC ACID    Collection Time: 20 10:18 AM   Result Value Ref Range    Uric Acid 4.0 1.9 - 8.2 mg/dL   Prothrombin Time    Collection Time: 20 10:18 AM   Result Value Ref Range    PT 13.0 12.0 - 14.6 sec    INR 0.95 0.87 - 1.13   APTT    Collection Time: 20 10:18 AM   Result Value Ref Range    APTT 23.5 (L) 24.7 - 36.0 sec   FIBRINOGEN    Collection Time: 20 10:18 AM   Result Value Ref Range    Fibrinogen 404 215 - 460 mg/dL       A/P:  30 y.o.  @ 30w2d with Acute biliary pancreatitis wihtout infection  Pregnancy  - Q12hr labs - have been improving  - MRCP pending results  - fetal monitoring: q shift    dispo: MRCP pending plans for patient    Marylou Ruano D.O.  [facility], Women[facility]       Adequate: hears normal conversation without difficulty

## 2020-09-02 NOTE — CONSULTS
[facility] Medicine Consultation    Date of Service  9/1/2020    Referring Physician  Mick Chew M.D.    Consulting Physician  Roland La M.D.    Reason for Consultation  Assist with management of pancreatitis    History of Presenting Illness  30 y.o. female who presented 8/31/2020 with abdominal pain nausea and vomiting.  She is 30 weeks pregnant.  She reports that yesterday morning she started to have abdominal pain mid and right upper quadrant at  times radiating to her back.  Pain is mild to moderate worse after eating it is sharp in nature relieved with Tylenol and morphine.  She reports having similar episodes over the past few months which were mild and intermittent and brief in duration.  No hematemesis or coffee-ground emesis no diarrhea.  She denies any alcohol use or prior history of pancreatitis.  She reports that her pregnancy has been uncomplicated so far.  Denies vaginal bleeding.  No dysuria or gross hematuria.    Review of Systems  Review of Systems   All other systems reviewed and are negative.      Past Medical History   has no past medical history of Addisons disease (formerly [facility]), Adrenal disorder (formerly [facility]), Allergy, Anemia, Anxiety, ASTHMA, Blood transfusion, Blood transfusion without reported diagnosis, CATARACT, Clotting disorder (formerly [facility]), COPD, Cushings syndrome (formerly [facility]), Depression, Diabetes, Diabetic neuropathy (formerly [facility]), EMPHYSEMA, GERD (gastroesophageal reflux disease), Goiter, Head ache, Headache(784.0), Heart attack (HCC), HIV (human immunodeficiency virus infection), HIV (human immunodeficiency virus infection) (formerly [facility]), Hyperlipidemia, IBD (inflammatory bowel disease), Kidney disease, Meningitis, Migraine, Migraine, Muscle disorder, OSTEOPOROSIS, Osteoporosis, Parathyroid disorder (formerly [facility]), Pituitary disease (formerly [facility]), Pulmonary emphysema (formerly [facility]), Sickle cell disease (formerly [facility]), Substance abuse (formerly [facility]), Thyroid disease, Tuberculosis, Ulcer, Urinary tract infection, or Urinary tract infection, site not  specified.    Surgical History   has a past surgical history that includes foot surgery; dilation and curettage (3/14/2013); primary c section (12/5/2015); and repeat c section (Bilateral, 5/19/2019).    Family History  Reviewed and not pertinent to the presenting problem    Social History   reports that she has never smoked. She has never used smokeless tobacco. She reports that she does not drink alcohol or use drugs.    Medications  Prior to Admission Medications   Prescriptions Last Dose Informant Patient Reported? Taking?   Prenatal MV-Min-Fe Fum-FA-DHA (PRENATAL 1 PO)   Yes No   Sig: Take  by mouth.     docusate sodium 100 MG Cap   No No   Sig: Take 100 mg by mouth 2 times a day as needed for Constipation.   ferrous sulfate 325 (65 Fe) MG tablet   No No   Sig: Take 1 Tab by mouth every morning with breakfast.   ibuprofen (MOTRIN) 800 MG Tab   No No   Sig: Take 1 Tab by mouth every 6 hours as needed (For cramping after delivery; do not give if patient is receiving ketorolac (Toradol)).      Facility-Administered Medications: None       Allergies  No Known Allergies    Physical Exam  Temp:  [35.9 °C (96.6 °F)-36.7 °C (98 °F)] 36.2 °C (97.2 °F)  Pulse:  [62-85] 75  Resp:  [16-20] 18  BP: (106-116)/(58-63) 116/60  SpO2:  [98 %] 98 %    Physical Exam  Vitals signs and nursing note reviewed.   Constitutional:       General: She is not in acute distress.  HENT:      Head: Normocephalic and atraumatic.      Nose: Nose normal.      Mouth/Throat:      Pharynx: No oropharyngeal exudate or posterior oropharyngeal erythema.   Eyes:      General:         Right eye: No discharge.         Left eye: No discharge.   Neck:      Musculoskeletal: Neck supple.   Cardiovascular:      Rate and Rhythm: Normal rate and regular rhythm.      Heart sounds: No murmur. No friction rub. No gallop.    Pulmonary:      Effort: Pulmonary effort is normal. No respiratory distress.      Breath sounds: No stridor. No rhonchi or rales.   Chest:       Chest wall: No tenderness.   Abdominal:      General: Bowel sounds are normal. There is distension.      Palpations: Abdomen is soft. There is no mass.      Tenderness: There is no abdominal tenderness. There is no guarding.   Musculoskeletal:         General: No swelling or tenderness.   Skin:     General: Skin is warm and dry.      Coloration: Skin is not cyanotic.      Nails: There is no clubbing.     Neurological:      General: No focal deficit present.      Mental Status: She is alert and oriented to person, place, and time.      Cranial Nerves: No cranial nerve deficit.      Motor: No weakness.   Psychiatric:         Mood and Affect: Mood normal.         Behavior: Behavior normal.         Thought Content: Thought content normal.         Judgment: Judgment normal.         Fluids  Date 09/01/20 0700 - 09/02/20 0659   Shift 2640-4093 4859-8581 4151-3756 24 Hour Total   INTAKE   I.V.  2000 2000   IV Piggyback 400   400   Shift Total 400 2000  2400   OUTPUT   Urine 100 170  270   Shift Total 100 170  270   Weight (kg) 73.9 73.9 73.9 73.9       Laboratory  Recent Labs     08/31/20 2123 09/01/20 0426 09/01/20  1812   WBC 11.0* 9.6 9.8   RBC 4.14* 3.79* 3.43*   HEMOGLOBIN 11.7* 10.8* 9.7*   HEMATOCRIT 36.9* 33.6* 30.1*   MCV 89.1 88.7 87.8   MCH 28.3 28.5 28.3   MCHC 31.7* 32.1* 32.2*   RDW 52.8* 52.1* 50.6*   PLATELETCT 205 197 173   MPV 11.6 11.1 11.1     Recent Labs     08/31/20 2123 09/01/20 0426 09/01/20  1440   SODIUM 138 139 134*   POTASSIUM 3.2* 3.7 3.4*   CHLORIDE 102 104 102   CO2 19* 19* 20   GLUCOSE 116* 120* 102*   BUN 7* 8 6*   CREATININE 0.56 0.51 0.38*   CALCIUM 9.0 8.5 8.0*                     Imaging  US-RUQ   Final Result         1.  Dilated common bile duct, concerning for distal obstructing stone, stenosis, or ampullary lesion, recommend follow-up ERCP or MRCP for further characterization.   2.  Cholelithiasis without additional sonographic findings of acute cholecystitis.   3.  Hepatomegaly       US-OB 2ND 3RD TRI COMPLETE   Final Result         1.  Single intrauterine pregnancy of an estimated gestational age of 30 weeks, 3 days with an estimated date of delivery of 11/06/2020.   2.  No gross fetal anatomic abnormality identified, Limited views of the spine and heart limit evaluation.          Assessment/Plan  * Acute biliary pancreatitis without infection or necrosis  Assessment & Plan  Given dilated common bile duct on ultrasound concern for CBD obstruction    Check MRCP and if confirms CBD obstruction will need to consult GI for ERCP  Continue supportive care with IV hydration pain management and antiemetics  Check lipid panel  Monitor LFTs    Discussed with Dr. Bowie      Hypokalemia  Assessment & Plan  Replete and monitor  Check magnesium    30 weeks gestation of pregnancy  Assessment & Plan  Management per OB

## 2020-09-02 NOTE — PROGRESS NOTES
Attending Hospitalist is Dr Romero starting at 0700. Please contact this physician for orders, updates or questions today.

## 2020-09-02 NOTE — PROGRESS NOTES
1900: Received report from BRIAN Ricks RN. POC discussed.    1928: COVID swab obtained and sent. Pt denies pain at this time, comfortable in bed and watching TV.     2110: Dr. Bowie notified of lab results.    0700: Dr. Bowie updated.    0710: Report given to HERI Barton RN. POC discussed.

## 2020-09-02 NOTE — ASSESSMENT & PLAN NOTE
Given dilated common bile duct on ultrasound concern for CBD obstruction  Await MRCP and if confirms CBD obstruction will need to consult GI for ERCP  Continue supportive care with IV hydration pain management and antiemetics  Check triglyceride level  Monitor LFTs with CMP

## 2020-09-02 NOTE — PROGRESS NOTES
Hospital Medicine Daily Progress Note    Date of Service  9/2/2020    Chief Complaint  Abdominal pain    Hospital Course    30 y.o. female pregnant 30 weeks admitted 8/31/2020 with asymptomatic COVID19 positive, with abdominal pain and imaging showing bile duct dilation with elevated LFTs.  She is awaiting a MRCP.      Interval Problem Update  Alert and oriented  No cough, SOB, F/C  Some RUQ abd pain  Awaits MRCP    Consultants/Specialty  Hospitalist  OB (primary)    Code Status  Full Code    Disposition  Awaits imaging then plan then home.    Review of Systems  Review of Systems   Constitutional: Negative for chills and fever.   HENT: Negative for sore throat.    Respiratory: Negative for cough, shortness of breath and stridor.    Cardiovascular: Negative for leg swelling.   Gastrointestinal: Positive for abdominal pain. Negative for diarrhea, nausea and vomiting.   Genitourinary: Negative for dysuria and hematuria.   Musculoskeletal: Negative for back pain and joint pain.   Neurological: Negative for speech change and headaches.   Psychiatric/Behavioral: The patient is not nervous/anxious.         Physical Exam  Temp:  [35.6 °C (96 °F)-36.6 °C (97.8 °F)] 36.3 °C (97.4 °F)  Pulse:  [] 87  Resp:  [17-18] 18  BP: ()/(51-62) 100/59    Physical Exam  Vitals signs reviewed.   Constitutional:       Appearance: Normal appearance. She is not diaphoretic.   HENT:      Head: Normocephalic and atraumatic.      Nose: Nose normal.      Mouth/Throat:      Mouth: Mucous membranes are moist.   Eyes:      General: No scleral icterus.        Right eye: No discharge.         Left eye: No discharge.      Extraocular Movements: Extraocular movements intact.      Conjunctiva/sclera: Conjunctivae normal.      Pupils: Pupils are equal, round, and reactive to light.   Neck:      Musculoskeletal: Normal range of motion.   Cardiovascular:      Pulses:           Radial pulses are 2+ on the right side and 2+ on the left side.         Dorsalis pedis pulses are 2+ on the right side and 2+ on the left side.   Pulmonary:      Effort: Pulmonary effort is normal. No respiratory distress.   Abdominal:      General: There is distension.   Skin:     Coloration: Skin is not jaundiced or pale.   Neurological:      General: No focal deficit present.      Mental Status: She is alert and oriented to person, place, and time. Mental status is at baseline.      Cranial Nerves: No cranial nerve deficit.   Psychiatric:         Mood and Affect: Mood normal.         Behavior: Behavior normal.         Fluids    Intake/Output Summary (Last 24 hours) at 9/2/2020 1838  Last data filed at 9/2/2020 1800  Gross per 24 hour   Intake 1650 ml   Output 5025 ml   Net -3375 ml       Laboratory  Recent Labs     09/01/20  0426 09/01/20  1812 09/02/20  1018   WBC 9.6 9.8 7.4   RBC 3.79* 3.43* 3.54*   HEMOGLOBIN 10.8* 9.7* 9.8*   HEMATOCRIT 33.6* 30.1* 30.5*   MCV 88.7 87.8 86.2   MCH 28.5 28.3 27.7   MCHC 32.1* 32.2* 32.1*   RDW 52.1* 50.6* 50.5*   PLATELETCT 197 173 179   MPV 11.1 11.1 11.8     Recent Labs     09/01/20  0426 09/01/20  1440 09/02/20  0535   SODIUM 139 134* 136   POTASSIUM 3.7 3.4* 4.0   CHLORIDE 104 102 105   CO2 19* 20 20   GLUCOSE 120* 102* 98   BUN 8 6* 4*   CREATININE 0.51 0.38* 0.37*   CALCIUM 8.5 8.0* 8.1*     Recent Labs     09/01/20  1812 09/02/20  1018   APTT 28.8 23.5*   INR 1.02 0.95               Imaging  US-RUQ   Final Result         1.  Dilated common bile duct, concerning for distal obstructing stone, stenosis, or ampullary lesion, recommend follow-up ERCP or MRCP for further characterization.   2.  Cholelithiasis without additional sonographic findings of acute cholecystitis.   3.  Hepatomegaly      US-OB 2ND 3RD TRI COMPLETE   Final Result         1.  Single intrauterine pregnancy of an estimated gestational age of 30 weeks, 3 days with an estimated date of delivery of 11/06/2020.   2.  No gross fetal anatomic abnormality identified, Limited views  of the spine and heart limit evaluation.      [id number]    (Results Pending)        Assessment/Plan  * Acute biliary pancreatitis without infection or necrosis  Assessment & Plan  Given dilated common bile duct on ultrasound concern for CBD obstruction  Await MRCP and if confirms CBD obstruction will need to consult GI for ERCP  Continue supportive care with IV hydration pain management and antiemetics  Check triglyceride level  Monitor LFTs with CMP        COVID-19 virus detected  Assessment & Plan  Isolation and contact precautions.  Currently asymptomatic  On room air    30 weeks gestation of pregnancy  Assessment & Plan  Management per OB    Hypokalemia  Assessment & Plan  Replete and monitor       VTE prophylaxis: lovenox

## 2020-09-03 LAB
ALBUMIN SERPL BCP-MCNC: 2.8 G/DL (ref 3.2–4.9)
ALBUMIN SERPL BCP-MCNC: 3 G/DL (ref 3.2–4.9)
ALBUMIN/GLOB SERPL: 1.1 G/DL
ALBUMIN/GLOB SERPL: 1.3 G/DL
ALP SERPL-CCNC: 115 U/L (ref 30–99)
ALP SERPL-CCNC: 123 U/L (ref 30–99)
ALT SERPL-CCNC: 44 U/L (ref 2–50)
ALT SERPL-CCNC: 44 U/L (ref 2–50)
AMYLASE SERPL-CCNC: 145 U/L (ref 20–103)
AMYLASE SERPL-CCNC: 159 U/L (ref 20–103)
ANION GAP SERPL CALC-SCNC: 12 MMOL/L (ref 7–16)
ANION GAP SERPL CALC-SCNC: 12 MMOL/L (ref 7–16)
AST SERPL-CCNC: 20 U/L (ref 12–45)
AST SERPL-CCNC: 20 U/L (ref 12–45)
BACTERIA UR CULT: NORMAL
BILIRUB SERPL-MCNC: 0.6 MG/DL (ref 0.1–1.5)
BILIRUB SERPL-MCNC: 0.7 MG/DL (ref 0.1–1.5)
BUN SERPL-MCNC: 3 MG/DL (ref 8–22)
BUN SERPL-MCNC: 4 MG/DL (ref 8–22)
C TRACH DNA SPEC QL NAA+PROBE: NEGATIVE
CALCIUM SERPL-MCNC: 8.3 MG/DL (ref 8.5–10.5)
CALCIUM SERPL-MCNC: 8.6 MG/DL (ref 8.5–10.5)
CHLORIDE SERPL-SCNC: 104 MMOL/L (ref 96–112)
CHLORIDE SERPL-SCNC: 105 MMOL/L (ref 96–112)
CO2 SERPL-SCNC: 21 MMOL/L (ref 20–33)
CO2 SERPL-SCNC: 21 MMOL/L (ref 20–33)
CREAT SERPL-MCNC: 0.38 MG/DL (ref 0.5–1.4)
CREAT SERPL-MCNC: 0.45 MG/DL (ref 0.5–1.4)
ERYTHROCYTE [DISTWIDTH] IN BLOOD BY AUTOMATED COUNT: 53 FL (ref 35.9–50)
ERYTHROCYTE [DISTWIDTH] IN BLOOD BY AUTOMATED COUNT: 53.1 FL (ref 35.9–50)
GLOBULIN SER CALC-MCNC: 2.4 G/DL (ref 1.9–3.5)
GLOBULIN SER CALC-MCNC: 2.5 G/DL (ref 1.9–3.5)
GLUCOSE SERPL-MCNC: 79 MG/DL (ref 65–99)
GLUCOSE SERPL-MCNC: 96 MG/DL (ref 65–99)
HCT VFR BLD AUTO: 29.7 % (ref 37–47)
HCT VFR BLD AUTO: 29.8 % (ref 37–47)
HGB BLD-MCNC: 9.7 G/DL (ref 12–16)
HGB BLD-MCNC: 9.8 G/DL (ref 12–16)
LIPASE SERPL-CCNC: 152 U/L (ref 11–82)
LIPASE SERPL-CCNC: 165 U/L (ref 11–82)
MCH RBC QN AUTO: 29 PG (ref 27–33)
MCH RBC QN AUTO: 29.2 PG (ref 27–33)
MCHC RBC AUTO-ENTMCNC: 32.6 G/DL (ref 33.6–35)
MCHC RBC AUTO-ENTMCNC: 33 G/DL (ref 33.6–35)
MCV RBC AUTO: 88.4 FL (ref 81.4–97.8)
MCV RBC AUTO: 89 FL (ref 81.4–97.8)
N GONORRHOEA DNA SPEC QL NAA+PROBE: NEGATIVE
PLATELET # BLD AUTO: 166 K/UL (ref 164–446)
PLATELET # BLD AUTO: 193 K/UL (ref 164–446)
PMV BLD AUTO: 10.6 FL (ref 9–12.9)
PMV BLD AUTO: 11.2 FL (ref 9–12.9)
POTASSIUM SERPL-SCNC: 3.5 MMOL/L (ref 3.6–5.5)
POTASSIUM SERPL-SCNC: 3.6 MMOL/L (ref 3.6–5.5)
PROT SERPL-MCNC: 5.3 G/DL (ref 6–8.2)
PROT SERPL-MCNC: 5.4 G/DL (ref 6–8.2)
RBC # BLD AUTO: 3.35 M/UL (ref 4.2–5.4)
RBC # BLD AUTO: 3.36 M/UL (ref 4.2–5.4)
SIGNIFICANT IND 70042: NORMAL
SITE SITE: NORMAL
SODIUM SERPL-SCNC: 137 MMOL/L (ref 135–145)
SODIUM SERPL-SCNC: 138 MMOL/L (ref 135–145)
SOURCE SOURCE: NORMAL
SPECIMEN SOURCE: NORMAL
WBC # BLD AUTO: 6.6 K/UL (ref 4.8–10.8)
WBC # BLD AUTO: 7 K/UL (ref 4.8–10.8)

## 2020-09-03 PROCEDURE — 302790 HCHG STAT ANTEPARTUM CARE, DAILY

## 2020-09-03 PROCEDURE — 59025 FETAL NON-STRESS TEST: CPT

## 2020-09-03 PROCEDURE — 36415 COLL VENOUS BLD VENIPUNCTURE: CPT

## 2020-09-03 PROCEDURE — 700102 HCHG RX REV CODE 250 W/ 637 OVERRIDE(OP): Performed by: OBSTETRICS & GYNECOLOGY

## 2020-09-03 PROCEDURE — 700111 HCHG RX REV CODE 636 W/ 250 OVERRIDE (IP): Performed by: OBSTETRICS & GYNECOLOGY

## 2020-09-03 PROCEDURE — 80053 COMPREHEN METABOLIC PANEL: CPT

## 2020-09-03 PROCEDURE — A9270 NON-COVERED ITEM OR SERVICE: HCPCS | Performed by: OBSTETRICS & GYNECOLOGY

## 2020-09-03 PROCEDURE — 83690 ASSAY OF LIPASE: CPT

## 2020-09-03 PROCEDURE — 85027 COMPLETE CBC AUTOMATED: CPT

## 2020-09-03 PROCEDURE — 99232 SBSQ HOSP IP/OBS MODERATE 35: CPT | Mod: 25 | Performed by: OBSTETRICS & GYNECOLOGY

## 2020-09-03 PROCEDURE — 770002 HCHG ROOM/CARE - OB PRIVATE (112)

## 2020-09-03 PROCEDURE — 82150 ASSAY OF AMYLASE: CPT | Mod: 91

## 2020-09-03 PROCEDURE — 700105 HCHG RX REV CODE 258: Performed by: OBSTETRICS & GYNECOLOGY

## 2020-09-03 PROCEDURE — 59025 FETAL NON-STRESS TEST: CPT | Mod: 26 | Performed by: OBSTETRICS & GYNECOLOGY

## 2020-09-03 RX ADMIN — DOCUSATE SODIUM 100 MG: 100 CAPSULE ORAL at 18:09

## 2020-09-03 RX ADMIN — SODIUM CHLORIDE, POTASSIUM CHLORIDE, SODIUM LACTATE AND CALCIUM CHLORIDE: 600; 310; 30; 20 INJECTION, SOLUTION INTRAVENOUS at 12:37

## 2020-09-03 RX ADMIN — SODIUM CHLORIDE, POTASSIUM CHLORIDE, SODIUM LACTATE AND CALCIUM CHLORIDE: 600; 310; 30; 20 INJECTION, SOLUTION INTRAVENOUS at 19:17

## 2020-09-03 RX ADMIN — DOCUSATE SODIUM 100 MG: 100 CAPSULE ORAL at 06:05

## 2020-09-03 RX ADMIN — SODIUM CHLORIDE, POTASSIUM CHLORIDE, SODIUM LACTATE AND CALCIUM CHLORIDE: 600; 310; 30; 20 INJECTION, SOLUTION INTRAVENOUS at 06:05

## 2020-09-03 RX ADMIN — ENOXAPARIN SODIUM 40 MG: 100 INJECTION SUBCUTANEOUS at 18:09

## 2020-09-03 ASSESSMENT — PATIENT HEALTH QUESTIONNAIRE - PHQ9
SUM OF ALL RESPONSES TO PHQ9 QUESTIONS 1 AND 2: 0
1. LITTLE INTEREST OR PLEASURE IN DOING THINGS: NOT AT ALL
1. LITTLE INTEREST OR PLEASURE IN DOING THINGS: NOT AT ALL
SUM OF ALL RESPONSES TO PHQ9 QUESTIONS 1 AND 2: 0
2. FEELING DOWN, DEPRESSED, IRRITABLE, OR HOPELESS: NOT AT ALL
2. FEELING DOWN, DEPRESSED, IRRITABLE, OR HOPELESS: NOT AT ALL

## 2020-09-03 ASSESSMENT — PAIN DESCRIPTION - PAIN TYPE: TYPE: OTHER (COMMENT)

## 2020-09-03 NOTE — PROGRESS NOTES
ANTEPARTUM PROGRESS NOTE;    Sue Fraser is a 30 y.o. female  at 30w3d.  Admitted on 2020 with increasing abdominal pain.  Patient was diagnosed with biliary pancreatitis.  MRCP shows dilated CBD-7.5 mm with obstructing stone patient's symptoms have resolved and her labs are improving.  Patient was found to be COVID positive without respiratory symptoms or abnormal pulse oximetry.  Patient denies cough shortness of breath or chest pain.    Patient Active Problem List    Diagnosis Date Noted   • Acute biliary pancreatitis without infection or necrosis 2020     Priority: High   • COVID-19 virus detected 2020     Priority: Medium   • Hypokalemia 2020     Priority: Low   • 30 weeks gestation of pregnancy 2020   • Delivery of pregnancy by  section 2019   • History of macrosomia in infant in prior pregnancy, currently pregnant 2018   • Hx of molar pregnancy, antepartum 2015       Review of systems; denies vaginal bleeding, leakage of fluid, uterine contractions, fever chills or abdominal pain  No past medical history on file.  Past Surgical History:   Procedure Laterality Date   • REPEAT C SECTION Bilateral 2019    Procedure:  SECTION, REPEAT;  Surgeon: Eulalio Frazier M.D.;  Location: LABOR AND DELIVERY;  Service: Labor and Delivery   • PRIMARY C SECTION  2015    Procedure: PRIMARY C SECTION;  Surgeon: Lindsey Stein M.D.;  Location: LABOR AND DELIVERY;  Service:    • DILATION AND CURETTAGE  3/14/2013    Performed by Kj Castrejon M.D. at [facility]   • FOOT SURGERY      right foot surgery age 7     Patient has no known allergies.  Social History     Socioeconomic History   • Marital status: Single     Spouse name: Not on file   • Number of children: Not on file   • Years of education: Not on file   • Highest education level: Not on file   Occupational History   • Not on file   Social Needs   • Financial  resource strain: Not on file   • Food insecurity     Worry: Not on file     Inability: Not on file   • Transportation needs     Medical: Not on file     Non-medical: Not on file   Tobacco Use   • Smoking status: Never Smoker   • Smokeless tobacco: Never Used   Substance and Sexual Activity   • Alcohol use: No   • Drug use: No   • Sexual activity: Never     Partners: Male     Birth control/protection: Condom     Comment: None   Lifestyle   • Physical activity     Days per week: Not on file     Minutes per session: Not on file   • Stress: Not on file   Relationships   • Social connections     Talks on phone: Not on file     Gets together: Not on file     Attends Orthodoxy service: Not on file     Active member of club or organization: Not on file     Attends meetings of clubs or organizations: Not on file     Relationship status: Not on file   • Intimate partner violence     Fear of current or ex partner: Not on file     Emotionally abused: Not on file     Physically abused: Not on file     Forced sexual activity: Not on file   Other Topics Concern   • Not on file   Social History Narrative   • Not on file         Physical examination;  Alert and oriented x3  Gen.-well-developed well-nourished female in no apparent distress  HEENT-normocephalic, nontraumatic,EOMI,PERRLA  /56   Pulse 91   Temp 36.7 °C (98 °F) (Temporal)   Resp 17   Ht 1.524 m (5')   Wt 73.9 kg (163 lb)   LMP 02/03/2020 (Exact Date)   SpO2 98%   BMI 31.83 kg/m²   Skin is warm and dry  Back-negative for CVA tenderness  Cardiovascular-regular rate and rhythm, normal S1-S2 no murmurs gallops  Lungs-clear to auscultation bilaterally  Abdomen-nondistended positive bowel sounds soft nontender without masses or hepatosplenomegaly  Cervix-not examined  Extremities without cyanosis clubbing or edema  Neurologic grossly intact    Labs;      NST-as performed and read by myself; reactive NST without contractions    Impression;  IUP AT 30w3d  Biliary  pancreatitis-no evidence of cholecystitis-improving clinically  COVID positive  Previous  section x2    Plan;  Start Lovenox  Repeat labs in a.m. possible discharge in 24 to 48 hours  Patient will be need to be discharged with Lovenox teaching/prescriptions  Arrangements will be made for patient follow-up as last patient of the day  We will start NSTs at 32 weeks      Eulalio Frazier MD

## 2020-09-03 NOTE — PROGRESS NOTES
MRCP results:      FINDINGS:  Liver shows no intrahepatic biliary dilation.  Gallbladder shows signal voids consistent with stones, with apparent stone in the gallbladder neck.  Common bile duct measures 7 mm.  No signal void to indicate stone.  Pancreatic duct is nondilated.  Mildly prominent bilateral renal collecting systems.  Gravid uterus noted.     IMPRESSION:     1.  Cholelithiasis with stone in the gallbladder neck.  2.  Mild biliary dilation.  3.  No evidence for common bile duct stone.  4.  Mild bilateral hydronephrosis, likely hydronephrosis of pregnancy.    No CBD obstruction.  Cholelithiasis present.  Patient's pain and labs are improving.  Will repeat labs in AM and consult general surgery if concern for worsening condition.      Marylou Ruano D.O.

## 2020-09-03 NOTE — PROGRESS NOTES
0700- Report received from Corrine ZEPEDA- poc discussed  0800- pt resting no c/o pain, bleeding, LOF, uc's +FM  0915- nst complete, monitors off  1200- pt resting no complaints  1500- pt resting  1800- meds given  1900- Report given to Cassi EZPEDA- poc discussed

## 2020-09-03 NOTE — PROGRESS NOTES
1900 Report received from HERI Barton RN. Pt off unit for MRI.   1930 Pt arrived back on unit from MRI. Assumed care of pt.   1939 EFM and toco applied for NST.   2005 EFM and toco removed after reactive NST.  2040 Hospitalist team paged to call back regarding MRI results.   2045 Return call from Dr. Marjan La, hospitalist. No new orders at this time.   0700 Report given to BRIAN Mcfadden RN.

## 2020-09-03 NOTE — CARE PLAN
Problem: Safety  Goal: Will remain free from falls  Outcome: PROGRESSING AS EXPECTED     Problem: Infection  Goal: Will remain free from infection  Outcome: PROGRESSING AS EXPECTED  Note: Pt shows no s/s of infection despite + Covid     Problem: Venous Thromboembolism (VTW)/Deep Vein Thrombosis (DVT) Prevention:  Goal: Patient will participate in Venous Thrombosis (VTE)/Deep Vein Thrombosis (DVT)Prevention Measures  Outcome: PROGRESSING AS EXPECTED  Flowsheets (Taken 9/2/2020 1915)  SCDs, Sequential Compression Device: On  Note: SCD's on while pt in bed     Problem: Knowledge Deficit  Goal: Knowledge of disease process/condition, treatment plan, diagnostic tests, and medications will improve  Outcome: PROGRESSING AS EXPECTED  Note: POC discussed with pt, pt verbalizes understanding

## 2020-09-04 LAB
ALBUMIN SERPL BCP-MCNC: 2.9 G/DL (ref 3.2–4.9)
ALBUMIN SERPL BCP-MCNC: 3.1 G/DL (ref 3.2–4.9)
ALBUMIN/GLOB SERPL: 1.1 G/DL
ALBUMIN/GLOB SERPL: 1.2 G/DL
ALP SERPL-CCNC: 118 U/L (ref 30–99)
ALP SERPL-CCNC: 132 U/L (ref 30–99)
ALT SERPL-CCNC: 53 U/L (ref 2–50)
ALT SERPL-CCNC: 56 U/L (ref 2–50)
AMYLASE SERPL-CCNC: 158 U/L (ref 20–103)
AMYLASE SERPL-CCNC: 163 U/L (ref 20–103)
ANION GAP SERPL CALC-SCNC: 12 MMOL/L (ref 7–16)
ANION GAP SERPL CALC-SCNC: 14 MMOL/L (ref 7–16)
AST SERPL-CCNC: 25 U/L (ref 12–45)
AST SERPL-CCNC: 26 U/L (ref 12–45)
BILIRUB SERPL-MCNC: 0.6 MG/DL (ref 0.1–1.5)
BILIRUB SERPL-MCNC: 0.7 MG/DL (ref 0.1–1.5)
BUN SERPL-MCNC: 4 MG/DL (ref 8–22)
BUN SERPL-MCNC: 5 MG/DL (ref 8–22)
CALCIUM SERPL-MCNC: 8.3 MG/DL (ref 8.5–10.5)
CALCIUM SERPL-MCNC: 8.7 MG/DL (ref 8.5–10.5)
CHLORIDE SERPL-SCNC: 102 MMOL/L (ref 96–112)
CHLORIDE SERPL-SCNC: 106 MMOL/L (ref 96–112)
CO2 SERPL-SCNC: 20 MMOL/L (ref 20–33)
CO2 SERPL-SCNC: 20 MMOL/L (ref 20–33)
CREAT SERPL-MCNC: 0.51 MG/DL (ref 0.5–1.4)
CREAT SERPL-MCNC: 0.53 MG/DL (ref 0.5–1.4)
ERYTHROCYTE [DISTWIDTH] IN BLOOD BY AUTOMATED COUNT: 51.8 FL (ref 35.9–50)
ERYTHROCYTE [DISTWIDTH] IN BLOOD BY AUTOMATED COUNT: 52.4 FL (ref 35.9–50)
GLOBULIN SER CALC-MCNC: 2.6 G/DL (ref 1.9–3.5)
GLOBULIN SER CALC-MCNC: 2.6 G/DL (ref 1.9–3.5)
GLUCOSE SERPL-MCNC: 137 MG/DL (ref 65–99)
GLUCOSE SERPL-MCNC: 147 MG/DL (ref 65–99)
HCT VFR BLD AUTO: 29.6 % (ref 37–47)
HCT VFR BLD AUTO: 32.6 % (ref 37–47)
HGB BLD-MCNC: 10.3 G/DL (ref 12–16)
HGB BLD-MCNC: 9.6 G/DL (ref 12–16)
LIPASE SERPL-CCNC: 181 U/L (ref 11–82)
LIPASE SERPL-CCNC: 212 U/L (ref 11–82)
MCH RBC QN AUTO: 28.1 PG (ref 27–33)
MCH RBC QN AUTO: 28.6 PG (ref 27–33)
MCHC RBC AUTO-ENTMCNC: 31.6 G/DL (ref 33.6–35)
MCHC RBC AUTO-ENTMCNC: 32.4 G/DL (ref 33.6–35)
MCV RBC AUTO: 88.1 FL (ref 81.4–97.8)
MCV RBC AUTO: 89.1 FL (ref 81.4–97.8)
PLATELET # BLD AUTO: 183 K/UL (ref 164–446)
PLATELET # BLD AUTO: 210 K/UL (ref 164–446)
PMV BLD AUTO: 11.3 FL (ref 9–12.9)
PMV BLD AUTO: 11.3 FL (ref 9–12.9)
POTASSIUM SERPL-SCNC: 3.2 MMOL/L (ref 3.6–5.5)
POTASSIUM SERPL-SCNC: 3.4 MMOL/L (ref 3.6–5.5)
PROT SERPL-MCNC: 5.5 G/DL (ref 6–8.2)
PROT SERPL-MCNC: 5.7 G/DL (ref 6–8.2)
RBC # BLD AUTO: 3.36 M/UL (ref 4.2–5.4)
RBC # BLD AUTO: 3.66 M/UL (ref 4.2–5.4)
SODIUM SERPL-SCNC: 136 MMOL/L (ref 135–145)
SODIUM SERPL-SCNC: 138 MMOL/L (ref 135–145)
WBC # BLD AUTO: 5.4 K/UL (ref 4.8–10.8)
WBC # BLD AUTO: 6 K/UL (ref 4.8–10.8)

## 2020-09-04 PROCEDURE — 80053 COMPREHEN METABOLIC PANEL: CPT

## 2020-09-04 PROCEDURE — 59025 FETAL NON-STRESS TEST: CPT

## 2020-09-04 PROCEDURE — A9270 NON-COVERED ITEM OR SERVICE: HCPCS | Performed by: OBSTETRICS & GYNECOLOGY

## 2020-09-04 PROCEDURE — 770002 HCHG ROOM/CARE - OB PRIVATE (112)

## 2020-09-04 PROCEDURE — 700111 HCHG RX REV CODE 636 W/ 250 OVERRIDE (IP): Performed by: OBSTETRICS & GYNECOLOGY

## 2020-09-04 PROCEDURE — 85027 COMPLETE CBC AUTOMATED: CPT

## 2020-09-04 PROCEDURE — 700105 HCHG RX REV CODE 258: Performed by: OBSTETRICS & GYNECOLOGY

## 2020-09-04 PROCEDURE — 99231 SBSQ HOSP IP/OBS SF/LOW 25: CPT | Mod: 25 | Performed by: OBSTETRICS & GYNECOLOGY

## 2020-09-04 PROCEDURE — 700102 HCHG RX REV CODE 250 W/ 637 OVERRIDE(OP): Performed by: OBSTETRICS & GYNECOLOGY

## 2020-09-04 PROCEDURE — 83690 ASSAY OF LIPASE: CPT | Mod: 91

## 2020-09-04 PROCEDURE — 82150 ASSAY OF AMYLASE: CPT

## 2020-09-04 PROCEDURE — 36415 COLL VENOUS BLD VENIPUNCTURE: CPT

## 2020-09-04 PROCEDURE — 59025 FETAL NON-STRESS TEST: CPT | Mod: 26 | Performed by: OBSTETRICS & GYNECOLOGY

## 2020-09-04 PROCEDURE — 302790 HCHG STAT ANTEPARTUM CARE, DAILY

## 2020-09-04 RX ADMIN — DOCUSATE SODIUM 100 MG: 100 CAPSULE ORAL at 05:27

## 2020-09-04 RX ADMIN — ENOXAPARIN SODIUM 40 MG: 100 INJECTION SUBCUTANEOUS at 18:18

## 2020-09-04 RX ADMIN — SODIUM CHLORIDE, POTASSIUM CHLORIDE, SODIUM LACTATE AND CALCIUM CHLORIDE: 600; 310; 30; 20 INJECTION, SOLUTION INTRAVENOUS at 05:28

## 2020-09-04 RX ADMIN — DOCUSATE SODIUM 100 MG: 100 CAPSULE ORAL at 18:18

## 2020-09-04 ASSESSMENT — PATIENT HEALTH QUESTIONNAIRE - PHQ9
SUM OF ALL RESPONSES TO PHQ9 QUESTIONS 1 AND 2: 0
2. FEELING DOWN, DEPRESSED, IRRITABLE, OR HOPELESS: NOT AT ALL
1. LITTLE INTEREST OR PLEASURE IN DOING THINGS: NOT AT ALL

## 2020-09-04 NOTE — CARE PLAN
Problem: Safety  Goal: Will remain free from injury  Outcome: PROGRESSING AS EXPECTED     Problem: Pain Management  Goal: Pain level will decrease to patient's comfort goal  Outcome: PROGRESSING AS EXPECTED  Note: Assessing and monitoring patient's pain and implementing pharmacologic and nonpharmacologic means of pain management as needed. Educating patient concerning pain management options

## 2020-09-04 NOTE — PLAN OF CARE (IOPOC)
Medicine team consulted for suspected gall stone pancreatitis  LFTs improving  Pain controlled  Discussed with Dr Frazier  Medicine Team signing off

## 2020-09-04 NOTE — PROGRESS NOTES
0700- Report received from Cassi ZEPEDA- poc discussed  0730- pt resting no c/o pain, bleeding, LOF, uc's, +FM  0915- nst complete,monitors off  1015- pt up to take a shower, linens changed  1600- pt resting no complaints  1900- Report given to Latoya OZUNA RN- poc discussed

## 2020-09-04 NOTE — PROGRESS NOTES
1900) Report received from BRIAN Blackmon RN, POC discussed, assumed care of patient at this time  1920) Assessment performed. Patient is a  EDC  which makes her 30.3 weeks. Patient denies any vaginal bleeding, LOF or contractions at this time. Patient has no abdomen tenderness. States positive fetal movement.  0100) Patient resting on and off in bed with no s/s of distress noted, respirations even and unlabored.

## 2020-09-05 VITALS
HEIGHT: 60 IN | SYSTOLIC BLOOD PRESSURE: 107 MMHG | BODY MASS INDEX: 32 KG/M2 | WEIGHT: 163 LBS | DIASTOLIC BLOOD PRESSURE: 55 MMHG | HEART RATE: 93 BPM | OXYGEN SATURATION: 98 % | TEMPERATURE: 97.8 F | RESPIRATION RATE: 17 BRPM

## 2020-09-05 LAB
ALBUMIN SERPL BCP-MCNC: 2.8 G/DL (ref 3.2–4.9)
ALBUMIN SERPL BCP-MCNC: 3.1 G/DL (ref 3.2–4.9)
ALBUMIN/GLOB SERPL: 1 G/DL
ALBUMIN/GLOB SERPL: 1.1 G/DL
ALP SERPL-CCNC: 117 U/L (ref 30–99)
ALP SERPL-CCNC: 125 U/L (ref 30–99)
ALT SERPL-CCNC: 63 U/L (ref 2–50)
ALT SERPL-CCNC: 71 U/L (ref 2–50)
AMYLASE SERPL-CCNC: 184 U/L (ref 20–103)
AMYLASE SERPL-CCNC: 211 U/L (ref 20–103)
ANION GAP SERPL CALC-SCNC: 12 MMOL/L (ref 7–16)
ANION GAP SERPL CALC-SCNC: 13 MMOL/L (ref 7–16)
AST SERPL-CCNC: 32 U/L (ref 12–45)
AST SERPL-CCNC: 32 U/L (ref 12–45)
BASOPHILS # BLD AUTO: 0.2 % (ref 0–1.8)
BASOPHILS # BLD AUTO: 0.2 % (ref 0–1.8)
BASOPHILS # BLD: 0.01 K/UL (ref 0–0.12)
BASOPHILS # BLD: 0.01 K/UL (ref 0–0.12)
BILIRUB SERPL-MCNC: 0.5 MG/DL (ref 0.1–1.5)
BILIRUB SERPL-MCNC: 0.6 MG/DL (ref 0.1–1.5)
BUN SERPL-MCNC: 5 MG/DL (ref 8–22)
BUN SERPL-MCNC: 6 MG/DL (ref 8–22)
CALCIUM SERPL-MCNC: 8.3 MG/DL (ref 8.5–10.5)
CALCIUM SERPL-MCNC: 8.5 MG/DL (ref 8.5–10.5)
CHLORIDE SERPL-SCNC: 104 MMOL/L (ref 96–112)
CHLORIDE SERPL-SCNC: 106 MMOL/L (ref 96–112)
CO2 SERPL-SCNC: 20 MMOL/L (ref 20–33)
CO2 SERPL-SCNC: 22 MMOL/L (ref 20–33)
CREAT SERPL-MCNC: 0.45 MG/DL (ref 0.5–1.4)
CREAT SERPL-MCNC: 0.54 MG/DL (ref 0.5–1.4)
EOSINOPHIL # BLD AUTO: 0.05 K/UL (ref 0–0.51)
EOSINOPHIL # BLD AUTO: 0.1 K/UL (ref 0–0.51)
EOSINOPHIL NFR BLD: 0.9 % (ref 0–6.9)
EOSINOPHIL NFR BLD: 1.7 % (ref 0–6.9)
ERYTHROCYTE [DISTWIDTH] IN BLOOD BY AUTOMATED COUNT: 52 FL (ref 35.9–50)
ERYTHROCYTE [DISTWIDTH] IN BLOOD BY AUTOMATED COUNT: 52 FL (ref 35.9–50)
GLOBULIN SER CALC-MCNC: 2.7 G/DL (ref 1.9–3.5)
GLOBULIN SER CALC-MCNC: 2.8 G/DL (ref 1.9–3.5)
GLUCOSE SERPL-MCNC: 77 MG/DL (ref 65–99)
GLUCOSE SERPL-MCNC: 88 MG/DL (ref 65–99)
HCT VFR BLD AUTO: 30.6 % (ref 37–47)
HCT VFR BLD AUTO: 32.3 % (ref 37–47)
HGB BLD-MCNC: 10.2 G/DL (ref 12–16)
HGB BLD-MCNC: 9.7 G/DL (ref 12–16)
IMM GRANULOCYTES # BLD AUTO: 0.02 K/UL (ref 0–0.11)
IMM GRANULOCYTES # BLD AUTO: 0.02 K/UL (ref 0–0.11)
IMM GRANULOCYTES NFR BLD AUTO: 0.3 % (ref 0–0.9)
IMM GRANULOCYTES NFR BLD AUTO: 0.4 % (ref 0–0.9)
LIPASE SERPL-CCNC: 198 U/L (ref 11–82)
LYMPHOCYTES # BLD AUTO: 1.23 K/UL (ref 1–4.8)
LYMPHOCYTES # BLD AUTO: 1.59 K/UL (ref 1–4.8)
LYMPHOCYTES NFR BLD: 22.4 % (ref 22–41)
LYMPHOCYTES NFR BLD: 26.5 % (ref 22–41)
MCH RBC QN AUTO: 27.8 PG (ref 27–33)
MCH RBC QN AUTO: 28.1 PG (ref 27–33)
MCHC RBC AUTO-ENTMCNC: 31.6 G/DL (ref 33.6–35)
MCHC RBC AUTO-ENTMCNC: 31.7 G/DL (ref 33.6–35)
MCV RBC AUTO: 88 FL (ref 81.4–97.8)
MCV RBC AUTO: 88.7 FL (ref 81.4–97.8)
MONOCYTES # BLD AUTO: 0.3 K/UL (ref 0–0.85)
MONOCYTES # BLD AUTO: 0.33 K/UL (ref 0–0.85)
MONOCYTES NFR BLD AUTO: 5.5 % (ref 0–13.4)
MONOCYTES NFR BLD AUTO: 5.5 % (ref 0–13.4)
NEUTROPHILS # BLD AUTO: 3.88 K/UL (ref 2–7.15)
NEUTROPHILS # BLD AUTO: 3.95 K/UL (ref 2–7.15)
NEUTROPHILS NFR BLD: 65.8 % (ref 44–72)
NEUTROPHILS NFR BLD: 70.6 % (ref 44–72)
NRBC # BLD AUTO: 0 K/UL
NRBC # BLD AUTO: 0 K/UL
NRBC BLD-RTO: 0 /100 WBC
NRBC BLD-RTO: 0 /100 WBC
PLATELET # BLD AUTO: 201 K/UL (ref 164–446)
PLATELET # BLD AUTO: 217 K/UL (ref 164–446)
PMV BLD AUTO: 10.9 FL (ref 9–12.9)
PMV BLD AUTO: 11 FL (ref 9–12.9)
POTASSIUM SERPL-SCNC: 3.5 MMOL/L (ref 3.6–5.5)
POTASSIUM SERPL-SCNC: 3.7 MMOL/L (ref 3.6–5.5)
PROT SERPL-MCNC: 5.5 G/DL (ref 6–8.2)
PROT SERPL-MCNC: 5.9 G/DL (ref 6–8.2)
RBC # BLD AUTO: 3.45 M/UL (ref 4.2–5.4)
RBC # BLD AUTO: 3.67 M/UL (ref 4.2–5.4)
SODIUM SERPL-SCNC: 137 MMOL/L (ref 135–145)
SODIUM SERPL-SCNC: 140 MMOL/L (ref 135–145)
WBC # BLD AUTO: 5.5 K/UL (ref 4.8–10.8)
WBC # BLD AUTO: 6 K/UL (ref 4.8–10.8)

## 2020-09-05 PROCEDURE — 36415 COLL VENOUS BLD VENIPUNCTURE: CPT

## 2020-09-05 PROCEDURE — 302790 HCHG STAT ANTEPARTUM CARE, DAILY

## 2020-09-05 PROCEDURE — 700111 HCHG RX REV CODE 636 W/ 250 OVERRIDE (IP): Performed by: OBSTETRICS & GYNECOLOGY

## 2020-09-05 PROCEDURE — 59025 FETAL NON-STRESS TEST: CPT

## 2020-09-05 PROCEDURE — 80053 COMPREHEN METABOLIC PANEL: CPT

## 2020-09-05 PROCEDURE — 99238 HOSP IP/OBS DSCHRG MGMT 30/<: CPT | Performed by: OBSTETRICS & GYNECOLOGY

## 2020-09-05 PROCEDURE — 83690 ASSAY OF LIPASE: CPT

## 2020-09-05 PROCEDURE — 700102 HCHG RX REV CODE 250 W/ 637 OVERRIDE(OP): Performed by: OBSTETRICS & GYNECOLOGY

## 2020-09-05 PROCEDURE — 85025 COMPLETE CBC W/AUTO DIFF WBC: CPT

## 2020-09-05 PROCEDURE — A9270 NON-COVERED ITEM OR SERVICE: HCPCS | Performed by: OBSTETRICS & GYNECOLOGY

## 2020-09-05 PROCEDURE — 82150 ASSAY OF AMYLASE: CPT

## 2020-09-05 RX ADMIN — DOCUSATE SODIUM 100 MG: 100 CAPSULE ORAL at 07:08

## 2020-09-05 RX ADMIN — ENOXAPARIN SODIUM 40 MG: 100 INJECTION SUBCUTANEOUS at 17:39

## 2020-09-05 RX ADMIN — DOCUSATE SODIUM 100 MG: 100 CAPSULE ORAL at 17:38

## 2020-09-05 ASSESSMENT — PATIENT HEALTH QUESTIONNAIRE - PHQ9
2. FEELING DOWN, DEPRESSED, IRRITABLE, OR HOPELESS: NOT AT ALL
1. LITTLE INTEREST OR PLEASURE IN DOING THINGS: NOT AT ALL
SUM OF ALL RESPONSES TO PHQ9 QUESTIONS 1 AND 2: 0

## 2020-09-05 NOTE — CARE PLAN
Problem: Knowledge Deficit  Goal: Patient/Support Person demonstrates understanding regarding condition, prognosis and treatment needs during the pregnancy  Outcome: MET   POC discussed and pt states understanding at this time, pt asks questions as they present.   Problem: Pain  Goal: Alleviation of Pain or a reduction in pain to the patient's comfort goal  Outcome: MET   Pt not having any pain, pt understands to notify RN if she starts having pain anywhere.

## 2020-09-05 NOTE — PROGRESS NOTES
Sue Fraser   30w4d admitted for biliary pancreatitis    Subjective: No abdominal pain, nausea and vomiting, fevers, chills.  Denies contractions, leaking fluid, vaginal bleeding.  Fetus is active.  Denies shortness of breath, cough, chest pain.    Objective:   Vitals:    09/04/20 1258 09/04/20 1636 09/04/20 1931 09/04/20 2042   BP: 114/55 108/61 106/55 104/56   Pulse: 86 90 86 84   Resp: 18 18  18   Temp: 36.2 °C (97.1 °F) 36.5 °C (97.7 °F)  37.1 °C (98.7 °F)   TempSrc: Temporal Temporal  Temporal   SpO2:       Weight:       Height:           GENERAL: Alert, in no apparent distress  PSYCHIATRIC: Appropriate affect, intact insight and judgement.  RESPIRATORY: Normal respiratory effort.  Lungs clear to auscultation.   CARDIOVASCULAR: RRR.  ABDOMEN: Soft, gravid, nontender, nondistended.    BACK: No CVA tenderness  EXTREMITIES: No edema, calves NT  SKIN: No rash    NST INTERPRETATION - PER MY READ    INDICATIONS: pancreatitis, + COVID  UTERINE ACTIVITY: none  BASELINE: 150s  VARIABILITY: moderate  ACCELERATIONS: present  DECELERATIONS: absent  CATEGORY 1 TRACING      Lab:   Recent Results (from the past 48 hour(s))   Comp Metabolic Panel    Collection Time: 09/02/20  9:17 PM   Result Value Ref Range    Sodium 140 135 - 145 mmol/L    Potassium 3.7 3.6 - 5.5 mmol/L    Chloride 108 96 - 112 mmol/L    Co2 21 20 - 33 mmol/L    Anion Gap 11.0 7.0 - 16.0    Glucose 99 65 - 99 mg/dL    Bun 3 (L) 8 - 22 mg/dL    Creatinine 0.46 (L) 0.50 - 1.40 mg/dL    Calcium 8.6 8.5 - 10.5 mg/dL    AST(SGOT) 25 12 - 45 U/L    ALT(SGPT) 46 2 - 50 U/L    Alkaline Phosphatase 125 (H) 30 - 99 U/L    Total Bilirubin 0.8 0.1 - 1.5 mg/dL    Albumin 3.0 (L) 3.2 - 4.9 g/dL    Total Protein 5.4 (L) 6.0 - 8.2 g/dL    Globulin 2.4 1.9 - 3.5 g/dL    A-G Ratio 1.3 g/dL   ESTIMATED GFR    Collection Time: 09/02/20  9:17 PM   Result Value Ref Range    GFR If African American >60 >60 mL/min/1.73 m 2    GFR If Non  >60 >60  mL/min/1.73 m 2   Comp Metabolic Panel    Collection Time: 09/03/20  9:06 AM   Result Value Ref Range    Sodium 138 135 - 145 mmol/L    Potassium 3.6 3.6 - 5.5 mmol/L    Chloride 105 96 - 112 mmol/L    Co2 21 20 - 33 mmol/L    Anion Gap 12.0 7.0 - 16.0    Glucose 79 65 - 99 mg/dL    Bun 3 (L) 8 - 22 mg/dL    Creatinine 0.38 (L) 0.50 - 1.40 mg/dL    Calcium 8.3 (L) 8.5 - 10.5 mg/dL    AST(SGOT) 20 12 - 45 U/L    ALT(SGPT) 44 2 - 50 U/L    Alkaline Phosphatase 115 (H) 30 - 99 U/L    Total Bilirubin 0.7 0.1 - 1.5 mg/dL    Albumin 2.8 (L) 3.2 - 4.9 g/dL    Total Protein 5.3 (L) 6.0 - 8.2 g/dL    Globulin 2.5 1.9 - 3.5 g/dL    A-G Ratio 1.1 g/dL   CBC WITHOUT DIFFERENTIAL    Collection Time: 09/03/20  9:06 AM   Result Value Ref Range    WBC 6.6 4.8 - 10.8 K/uL    RBC 3.35 (L) 4.20 - 5.40 M/uL    Hemoglobin 9.7 (L) 12.0 - 16.0 g/dL    Hematocrit 29.8 (L) 37.0 - 47.0 %    MCV 89.0 81.4 - 97.8 fL    MCH 29.0 27.0 - 33.0 pg    MCHC 32.6 (L) 33.6 - 35.0 g/dL    RDW 53.1 (H) 35.9 - 50.0 fL    Platelet Count 166 164 - 446 K/uL    MPV 10.6 9.0 - 12.9 fL   AMYLASE    Collection Time: 09/03/20  9:06 AM   Result Value Ref Range    Amylase 159 (H) 20 - 103 U/L   LIPASE    Collection Time: 09/03/20  9:06 AM   Result Value Ref Range    Lipase 152 (H) 11 - 82 U/L   ESTIMATED GFR    Collection Time: 09/03/20  9:06 AM   Result Value Ref Range    GFR If African American >60 >60 mL/min/1.73 m 2    GFR If Non African American >60 >60 mL/min/1.73 m 2   Comp Metabolic Panel    Collection Time: 09/03/20  8:51 PM   Result Value Ref Range    Sodium 137 135 - 145 mmol/L    Potassium 3.5 (L) 3.6 - 5.5 mmol/L    Chloride 104 96 - 112 mmol/L    Co2 21 20 - 33 mmol/L    Anion Gap 12.0 7.0 - 16.0    Glucose 96 65 - 99 mg/dL    Bun 4 (L) 8 - 22 mg/dL    Creatinine 0.45 (L) 0.50 - 1.40 mg/dL    Calcium 8.6 8.5 - 10.5 mg/dL    AST(SGOT) 20 12 - 45 U/L    ALT(SGPT) 44 2 - 50 U/L    Alkaline Phosphatase 123 (H) 30 - 99 U/L    Total Bilirubin 0.6 0.1 - 1.5  mg/dL    Albumin 3.0 (L) 3.2 - 4.9 g/dL    Total Protein 5.4 (L) 6.0 - 8.2 g/dL    Globulin 2.4 1.9 - 3.5 g/dL    A-G Ratio 1.3 g/dL   AMYLASE    Collection Time: 09/03/20  8:51 PM   Result Value Ref Range    Amylase 145 (H) 20 - 103 U/L   LIPASE    Collection Time: 09/03/20  8:51 PM   Result Value Ref Range    Lipase 165 (H) 11 - 82 U/L   CBC WITHOUT DIFFERENTIAL    Collection Time: 09/03/20  8:51 PM   Result Value Ref Range    WBC 7.0 4.8 - 10.8 K/uL    RBC 3.36 (L) 4.20 - 5.40 M/uL    Hemoglobin 9.8 (L) 12.0 - 16.0 g/dL    Hematocrit 29.7 (L) 37.0 - 47.0 %    MCV 88.4 81.4 - 97.8 fL    MCH 29.2 27.0 - 33.0 pg    MCHC 33.0 (L) 33.6 - 35.0 g/dL    RDW 53.0 (H) 35.9 - 50.0 fL    Platelet Count 193 164 - 446 K/uL    MPV 11.2 9.0 - 12.9 fL   ESTIMATED GFR    Collection Time: 09/03/20  8:51 PM   Result Value Ref Range    GFR If African American >60 >60 mL/min/1.73 m 2    GFR If Non African American >60 >60 mL/min/1.73 m 2   Comp Metabolic Panel    Collection Time: 09/04/20 10:59 AM   Result Value Ref Range    Sodium 138 135 - 145 mmol/L    Potassium 3.4 (L) 3.6 - 5.5 mmol/L    Chloride 106 96 - 112 mmol/L    Co2 20 20 - 33 mmol/L    Anion Gap 12.0 7.0 - 16.0    Glucose 147 (H) 65 - 99 mg/dL    Bun 4 (L) 8 - 22 mg/dL    Creatinine 0.53 0.50 - 1.40 mg/dL    Calcium 8.7 8.5 - 10.5 mg/dL    AST(SGOT) 26 12 - 45 U/L    ALT(SGPT) 53 (H) 2 - 50 U/L    Alkaline Phosphatase 132 (H) 30 - 99 U/L    Total Bilirubin 0.7 0.1 - 1.5 mg/dL    Albumin 3.1 (L) 3.2 - 4.9 g/dL    Total Protein 5.7 (L) 6.0 - 8.2 g/dL    Globulin 2.6 1.9 - 3.5 g/dL    A-G Ratio 1.2 g/dL   AMYLASE    Collection Time: 09/04/20 10:59 AM   Result Value Ref Range    Amylase 158 (H) 20 - 103 U/L   LIPASE    Collection Time: 09/04/20 10:59 AM   Result Value Ref Range    Lipase 181 (H) 11 - 82 U/L   CBC WITHOUT DIFFERENTIAL    Collection Time: 09/04/20 10:59 AM   Result Value Ref Range    WBC 5.4 4.8 - 10.8 K/uL    RBC 3.66 (L) 4.20 - 5.40 M/uL    Hemoglobin 10.3  (L) 12.0 - 16.0 g/dL    Hematocrit 32.6 (L) 37.0 - 47.0 %    MCV 89.1 81.4 - 97.8 fL    MCH 28.1 27.0 - 33.0 pg    MCHC 31.6 (L) 33.6 - 35.0 g/dL    RDW 52.4 (H) 35.9 - 50.0 fL    Platelet Count 210 164 - 446 K/uL    MPV 11.3 9.0 - 12.9 fL   ESTIMATED GFR    Collection Time: 20 10:59 AM   Result Value Ref Range    GFR If African American >60 >60 mL/min/1.73 m 2    GFR If Non African American >60 >60 mL/min/1.73 m 2       Assessment:   30-year-old  5 para 3-0-1-3 at 30-4/7 weeks admitted on 2020 for abdominal pain and diagnosed with biliary pancreatitis.  MRCP shows dilated common bile duct at 7.5 mm without obstructing stone.  The patient's symptoms have improved, but amylase, lipase, LFTs are trending upwards.  The patient also tested positive for COVID-19.  She is asymptomatic.      P.   Repeat CBC, CMP, LFTs, amylase and lipase in a.m.  NST every shift  Lovenox for DVT prophylaxis

## 2020-09-05 NOTE — PROGRESS NOTES
1900 Received report from SKIP Blackmon RN, POC discussed, assumed care. Pt resting in chair in room, denies needs at this time    2045 Dr Turner at bedside to discuss POC with patient    0700 Report given to CHERIE Fitzpatrick RN, POC discussed, assumed care

## 2020-09-05 NOTE — PROGRESS NOTES
0700  Report from NOC RN in room with pt and pt resting at this time.  RN to return later for assessment.  0945  In with pt and monitors placed at this time.  Pt reports positive fetal movement and has no report of bleeding or leaking.  Pt has no report of UC's and reports that her original back pain is not longer present.  Monitors placed at this time.  1030  Monitors removed after active strip, Dr. Garza visited pt and new orders received at this time for future labs.  1300  Lunch tray given to pt at this time and pt has no new complaints.  1800  PO medications given and pt has no new complaints.  1850  Report to [facility] RN outside the door for Isolation purposes.  Pt has no new complaints.

## 2020-09-06 NOTE — DISCHARGE SUMMARY
Sue Fraser   30w5d  Admission DX: Pregnancy  Indication for care in labor and delivery, antepartum    Date of Admission: 2020  Patient Active Problem List    Diagnosis Date Noted   • Acute biliary pancreatitis without infection or necrosis 2020     Priority: High   • COVID-19 virus detected 2020     Priority: Medium   • Hypokalemia 2020     Priority: Low   • 30 weeks gestation of pregnancy 2020   • Delivery of pregnancy by  section 2019   • History of macrosomia in infant in prior pregnancy, currently pregnant 2018   • Hx of molar pregnancy, antepartum 2015       Subjective:   Patient initially presented on  complaining of severe abdominal pain.  She was admitted and found to have cholelithiasis with a stone in the gallbladder neck with mild biliary dilation.  There was no evidence for common bile duct stone.  Clinically, her symptoms improved.  She was found to be COVID positive.  She is not having any symptoms of coronavirus.  She was initially kept for continued monitoring of her labs. There was a small rise in labs that initially prompted further monitoring. However, patients symptoms have resolved and she does not have any symptoms of coronavirus and spot Sp02 is normal.    Objective:   Vitals:    20 0016 20 0445 20 0909 20 1524   BP: 114/56 (!) 93/61 109/66 113/58   Pulse: 86 82 91 88   Resp: 18 16 16 16   Temp: 37.1 °C (98.8 °F) 36.4 °C (97.6 °F) 36.7 °C (98 °F) 36.6 °C (97.8 °F)   TempSrc: Temporal Temporal Temporal Temporal   SpO2:       Weight:       Height:         NST: 130 baseline with moderate variability and accelerations present.  No decelerations present.  No contractions noted.  Indication: Cholelithiasis and pregnancy, antepartum care  Gen: A&Ox3 NAD  Membranes: ruptured: no  Abdomen: gravid, soft, NT  Ext: SCDs on, Nt, no cyanosis or clubbing    Meds:     Current Facility-Administered Medications:   •   docusate sodium (COLACE) capsule 100 mg, 100 mg, Oral, BID, Marylou Ruano, D.O., 100 mg at 09/05/20 1738  •  enoxaparin (LOVENOX) inj 40 mg, 40 mg, Subcutaneous, DAILY, Marylou Ruano, D.O., 40 mg at 09/05/20 1739  •  metoclopramide (REGLAN) injection 10 mg, 10 mg, Intravenous, Q6HRS PRN, Marylou uRano, D.O., 10 mg at 09/02/20 1457  •  morphine (pf) 4 MG/ML injection 4 mg, 4 mg, Intravenous, Q4HRS PRN, Carrissia Gann, OGNP, 4 mg at 09/01/20 0628  •  famotidine (PEPCID) injection 20 mg, 20 mg, Intravenous, BID PRN, Carrissia Gann, OGNP, 20 mg at 09/02/20 1211  •  acetaminophen (TYLENOL) tablet 650 mg, 650 mg, Oral, Q4HRS PRN, Mata Bowie M.D., 650 mg at 09/02/20 0217  •  promethazine (PHENERGAN) tablet 25 mg, 25 mg, Oral, Q6HRS PRN, Mata Bowie M.D.  •  ondansetron (ZOFRAN) syringe/vial injection 4 mg, 4 mg, Intravenous, Q6HRS PRN, Carrissia Gann, OGNP, 4 mg at 09/01/20 0732  •  lactated ringers infusion, , Intravenous, Continuous, Mata Bowie M.D., Last Rate: 150 mL/hr at 09/04/20 0528  •  hydrOXYzine HCl (ATARAX) tablet 50 mg, 50 mg, Oral, TID PRN, Carrissia Gann, OGNP, 50 mg at 09/01/20 0141    Labs:    Lab:   Recent Results (from the past 72 hour(s))   Comp Metabolic Panel    Collection Time: 09/02/20  9:17 PM   Result Value Ref Range    Sodium 140 135 - 145 mmol/L    Potassium 3.7 3.6 - 5.5 mmol/L    Chloride 108 96 - 112 mmol/L    Co2 21 20 - 33 mmol/L    Anion Gap 11.0 7.0 - 16.0    Glucose 99 65 - 99 mg/dL    Bun 3 (L) 8 - 22 mg/dL    Creatinine 0.46 (L) 0.50 - 1.40 mg/dL    Calcium 8.6 8.5 - 10.5 mg/dL    AST(SGOT) 25 12 - 45 U/L    ALT(SGPT) 46 2 - 50 U/L    Alkaline Phosphatase 125 (H) 30 - 99 U/L    Total Bilirubin 0.8 0.1 - 1.5 mg/dL    Albumin 3.0 (L) 3.2 - 4.9 g/dL    Total Protein 5.4 (L) 6.0 - 8.2 g/dL    Globulin 2.4 1.9 - 3.5 g/dL    A-G Ratio 1.3 g/dL   ESTIMATED GFR    Collection Time: 09/02/20  9:17 PM   Result Value Ref Range    GFR If   >60 >60 mL/min/1.73 m 2    GFR If Non African American >60 >60 mL/min/1.73 m 2   Comp Metabolic Panel    Collection Time: 09/03/20  9:06 AM   Result Value Ref Range    Sodium 138 135 - 145 mmol/L    Potassium 3.6 3.6 - 5.5 mmol/L    Chloride 105 96 - 112 mmol/L    Co2 21 20 - 33 mmol/L    Anion Gap 12.0 7.0 - 16.0    Glucose 79 65 - 99 mg/dL    Bun 3 (L) 8 - 22 mg/dL    Creatinine 0.38 (L) 0.50 - 1.40 mg/dL    Calcium 8.3 (L) 8.5 - 10.5 mg/dL    AST(SGOT) 20 12 - 45 U/L    ALT(SGPT) 44 2 - 50 U/L    Alkaline Phosphatase 115 (H) 30 - 99 U/L    Total Bilirubin 0.7 0.1 - 1.5 mg/dL    Albumin 2.8 (L) 3.2 - 4.9 g/dL    Total Protein 5.3 (L) 6.0 - 8.2 g/dL    Globulin 2.5 1.9 - 3.5 g/dL    A-G Ratio 1.1 g/dL   CBC WITHOUT DIFFERENTIAL    Collection Time: 09/03/20  9:06 AM   Result Value Ref Range    WBC 6.6 4.8 - 10.8 K/uL    RBC 3.35 (L) 4.20 - 5.40 M/uL    Hemoglobin 9.7 (L) 12.0 - 16.0 g/dL    Hematocrit 29.8 (L) 37.0 - 47.0 %    MCV 89.0 81.4 - 97.8 fL    MCH 29.0 27.0 - 33.0 pg    MCHC 32.6 (L) 33.6 - 35.0 g/dL    RDW 53.1 (H) 35.9 - 50.0 fL    Platelet Count 166 164 - 446 K/uL    MPV 10.6 9.0 - 12.9 fL   AMYLASE    Collection Time: 09/03/20  9:06 AM   Result Value Ref Range    Amylase 159 (H) 20 - 103 U/L   LIPASE    Collection Time: 09/03/20  9:06 AM   Result Value Ref Range    Lipase 152 (H) 11 - 82 U/L   ESTIMATED GFR    Collection Time: 09/03/20  9:06 AM   Result Value Ref Range    GFR If African American >60 >60 mL/min/1.73 m 2    GFR If Non African American >60 >60 mL/min/1.73 m 2   Comp Metabolic Panel    Collection Time: 09/03/20  8:51 PM   Result Value Ref Range    Sodium 137 135 - 145 mmol/L    Potassium 3.5 (L) 3.6 - 5.5 mmol/L    Chloride 104 96 - 112 mmol/L    Co2 21 20 - 33 mmol/L    Anion Gap 12.0 7.0 - 16.0    Glucose 96 65 - 99 mg/dL    Bun 4 (L) 8 - 22 mg/dL    Creatinine 0.45 (L) 0.50 - 1.40 mg/dL    Calcium 8.6 8.5 - 10.5 mg/dL    AST(SGOT) 20 12 - 45 U/L    ALT(SGPT) 44 2 - 50 U/L    Alkaline  Phosphatase 123 (H) 30 - 99 U/L    Total Bilirubin 0.6 0.1 - 1.5 mg/dL    Albumin 3.0 (L) 3.2 - 4.9 g/dL    Total Protein 5.4 (L) 6.0 - 8.2 g/dL    Globulin 2.4 1.9 - 3.5 g/dL    A-G Ratio 1.3 g/dL   AMYLASE    Collection Time: 09/03/20  8:51 PM   Result Value Ref Range    Amylase 145 (H) 20 - 103 U/L   LIPASE    Collection Time: 09/03/20  8:51 PM   Result Value Ref Range    Lipase 165 (H) 11 - 82 U/L   CBC WITHOUT DIFFERENTIAL    Collection Time: 09/03/20  8:51 PM   Result Value Ref Range    WBC 7.0 4.8 - 10.8 K/uL    RBC 3.36 (L) 4.20 - 5.40 M/uL    Hemoglobin 9.8 (L) 12.0 - 16.0 g/dL    Hematocrit 29.7 (L) 37.0 - 47.0 %    MCV 88.4 81.4 - 97.8 fL    MCH 29.2 27.0 - 33.0 pg    MCHC 33.0 (L) 33.6 - 35.0 g/dL    RDW 53.0 (H) 35.9 - 50.0 fL    Platelet Count 193 164 - 446 K/uL    MPV 11.2 9.0 - 12.9 fL   ESTIMATED GFR    Collection Time: 09/03/20  8:51 PM   Result Value Ref Range    GFR If African American >60 >60 mL/min/1.73 m 2    GFR If Non African American >60 >60 mL/min/1.73 m 2   Comp Metabolic Panel    Collection Time: 09/04/20 10:59 AM   Result Value Ref Range    Sodium 138 135 - 145 mmol/L    Potassium 3.4 (L) 3.6 - 5.5 mmol/L    Chloride 106 96 - 112 mmol/L    Co2 20 20 - 33 mmol/L    Anion Gap 12.0 7.0 - 16.0    Glucose 147 (H) 65 - 99 mg/dL    Bun 4 (L) 8 - 22 mg/dL    Creatinine 0.53 0.50 - 1.40 mg/dL    Calcium 8.7 8.5 - 10.5 mg/dL    AST(SGOT) 26 12 - 45 U/L    ALT(SGPT) 53 (H) 2 - 50 U/L    Alkaline Phosphatase 132 (H) 30 - 99 U/L    Total Bilirubin 0.7 0.1 - 1.5 mg/dL    Albumin 3.1 (L) 3.2 - 4.9 g/dL    Total Protein 5.7 (L) 6.0 - 8.2 g/dL    Globulin 2.6 1.9 - 3.5 g/dL    A-G Ratio 1.2 g/dL   AMYLASE    Collection Time: 09/04/20 10:59 AM   Result Value Ref Range    Amylase 158 (H) 20 - 103 U/L   LIPASE    Collection Time: 09/04/20 10:59 AM   Result Value Ref Range    Lipase 181 (H) 11 - 82 U/L   CBC WITHOUT DIFFERENTIAL    Collection Time: 09/04/20 10:59 AM   Result Value Ref Range    WBC 5.4 4.8 -  10.8 K/uL    RBC 3.66 (L) 4.20 - 5.40 M/uL    Hemoglobin 10.3 (L) 12.0 - 16.0 g/dL    Hematocrit 32.6 (L) 37.0 - 47.0 %    MCV 89.1 81.4 - 97.8 fL    MCH 28.1 27.0 - 33.0 pg    MCHC 31.6 (L) 33.6 - 35.0 g/dL    RDW 52.4 (H) 35.9 - 50.0 fL    Platelet Count 210 164 - 446 K/uL    MPV 11.3 9.0 - 12.9 fL   ESTIMATED GFR    Collection Time: 09/04/20 10:59 AM   Result Value Ref Range    GFR If African American >60 >60 mL/min/1.73 m 2    GFR If Non African American >60 >60 mL/min/1.73 m 2   Comp Metabolic Panel    Collection Time: 09/04/20  8:42 PM   Result Value Ref Range    Sodium 136 135 - 145 mmol/L    Potassium 3.2 (L) 3.6 - 5.5 mmol/L    Chloride 102 96 - 112 mmol/L    Co2 20 20 - 33 mmol/L    Anion Gap 14.0 7.0 - 16.0    Glucose 137 (H) 65 - 99 mg/dL    Bun 5 (L) 8 - 22 mg/dL    Creatinine 0.51 0.50 - 1.40 mg/dL    Calcium 8.3 (L) 8.5 - 10.5 mg/dL    AST(SGOT) 25 12 - 45 U/L    ALT(SGPT) 56 (H) 2 - 50 U/L    Alkaline Phosphatase 118 (H) 30 - 99 U/L    Total Bilirubin 0.6 0.1 - 1.5 mg/dL    Albumin 2.9 (L) 3.2 - 4.9 g/dL    Total Protein 5.5 (L) 6.0 - 8.2 g/dL    Globulin 2.6 1.9 - 3.5 g/dL    A-G Ratio 1.1 g/dL   AMYLASE    Collection Time: 09/04/20  8:42 PM   Result Value Ref Range    Amylase 163 (H) 20 - 103 U/L   LIPASE    Collection Time: 09/04/20  8:42 PM   Result Value Ref Range    Lipase 212 (H) 11 - 82 U/L   CBC WITHOUT DIFFERENTIAL    Collection Time: 09/04/20  8:42 PM   Result Value Ref Range    WBC 6.0 4.8 - 10.8 K/uL    RBC 3.36 (L) 4.20 - 5.40 M/uL    Hemoglobin 9.6 (L) 12.0 - 16.0 g/dL    Hematocrit 29.6 (L) 37.0 - 47.0 %    MCV 88.1 81.4 - 97.8 fL    MCH 28.6 27.0 - 33.0 pg    MCHC 32.4 (L) 33.6 - 35.0 g/dL    RDW 51.8 (H) 35.9 - 50.0 fL    Platelet Count 183 164 - 446 K/uL    MPV 11.3 9.0 - 12.9 fL   ESTIMATED GFR    Collection Time: 09/04/20  8:42 PM   Result Value Ref Range    GFR If African American >60 >60 mL/min/1.73 m 2    GFR If Non African American >60 >60 mL/min/1.73 m 2   AMYLASE     Collection Time: 09/05/20  8:22 AM   Result Value Ref Range    Amylase 184 (H) 20 - 103 U/L   CBC WITH DIFFERENTIAL    Collection Time: 09/05/20  8:22 AM   Result Value Ref Range    WBC 6.0 4.8 - 10.8 K/uL    RBC 3.45 (L) 4.20 - 5.40 M/uL    Hemoglobin 9.7 (L) 12.0 - 16.0 g/dL    Hematocrit 30.6 (L) 37.0 - 47.0 %    MCV 88.7 81.4 - 97.8 fL    MCH 28.1 27.0 - 33.0 pg    MCHC 31.7 (L) 33.6 - 35.0 g/dL    RDW 52.0 (H) 35.9 - 50.0 fL    Platelet Count 201 164 - 446 K/uL    MPV 10.9 9.0 - 12.9 fL    Neutrophils-Polys 65.80 44.00 - 72.00 %    Lymphocytes 26.50 22.00 - 41.00 %    Monocytes 5.50 0.00 - 13.40 %    Eosinophils 1.70 0.00 - 6.90 %    Basophils 0.20 0.00 - 1.80 %    Immature Granulocytes 0.30 0.00 - 0.90 %    Nucleated RBC 0.00 /100 WBC    Neutrophils (Absolute) 3.95 2.00 - 7.15 K/uL    Lymphs (Absolute) 1.59 1.00 - 4.80 K/uL    Monos (Absolute) 0.33 0.00 - 0.85 K/uL    Eos (Absolute) 0.10 0.00 - 0.51 K/uL    Baso (Absolute) 0.01 0.00 - 0.12 K/uL    Immature Granulocytes (abs) 0.02 0.00 - 0.11 K/uL    NRBC (Absolute) 0.00 K/uL   Comp Metabolic Panel    Collection Time: 09/05/20  8:22 AM   Result Value Ref Range    Sodium 140 135 - 145 mmol/L    Potassium 3.5 (L) 3.6 - 5.5 mmol/L    Chloride 106 96 - 112 mmol/L    Co2 22 20 - 33 mmol/L    Anion Gap 12.0 7.0 - 16.0    Glucose 88 65 - 99 mg/dL    Bun 5 (L) 8 - 22 mg/dL    Creatinine 0.45 (L) 0.50 - 1.40 mg/dL    Calcium 8.3 (L) 8.5 - 10.5 mg/dL    AST(SGOT) 32 12 - 45 U/L    ALT(SGPT) 63 (H) 2 - 50 U/L    Alkaline Phosphatase 117 (H) 30 - 99 U/L    Total Bilirubin 0.5 0.1 - 1.5 mg/dL    Albumin 2.8 (L) 3.2 - 4.9 g/dL    Total Protein 5.5 (L) 6.0 - 8.2 g/dL    Globulin 2.7 1.9 - 3.5 g/dL    A-G Ratio 1.0 g/dL   LIPASE    Collection Time: 09/05/20  8:22 AM   Result Value Ref Range    Lipase 198 (H) 11 - 82 U/L   ESTIMATED GFR    Collection Time: 09/05/20  8:22 AM   Result Value Ref Range    GFR If African American >60 >60 mL/min/1.73 m 2    GFR If Non African  American >60 >60 mL/min/1.73 m 2   AMYLASE    Collection Time: 20  4:29 PM   Result Value Ref Range    Amylase 211 (H) 20 - 103 U/L   CBC WITH DIFFERENTIAL    Collection Time: 20  4:29 PM   Result Value Ref Range    WBC 5.5 4.8 - 10.8 K/uL    RBC 3.67 (L) 4.20 - 5.40 M/uL    Hemoglobin 10.2 (L) 12.0 - 16.0 g/dL    Hematocrit 32.3 (L) 37.0 - 47.0 %    MCV 88.0 81.4 - 97.8 fL    MCH 27.8 27.0 - 33.0 pg    MCHC 31.6 (L) 33.6 - 35.0 g/dL    RDW 52.0 (H) 35.9 - 50.0 fL    Platelet Count 217 164 - 446 K/uL    MPV 11.0 9.0 - 12.9 fL    Neutrophils-Polys 70.60 44.00 - 72.00 %    Lymphocytes 22.40 22.00 - 41.00 %    Monocytes 5.50 0.00 - 13.40 %    Eosinophils 0.90 0.00 - 6.90 %    Basophils 0.20 0.00 - 1.80 %    Immature Granulocytes 0.40 0.00 - 0.90 %    Nucleated RBC 0.00 /100 WBC    Neutrophils (Absolute) 3.88 2.00 - 7.15 K/uL    Lymphs (Absolute) 1.23 1.00 - 4.80 K/uL    Monos (Absolute) 0.30 0.00 - 0.85 K/uL    Eos (Absolute) 0.05 0.00 - 0.51 K/uL    Baso (Absolute) 0.01 0.00 - 0.12 K/uL    Immature Granulocytes (abs) 0.02 0.00 - 0.11 K/uL    NRBC (Absolute) 0.00 K/uL   Comp Metabolic Panel    Collection Time: 20  4:29 PM   Result Value Ref Range    Sodium 137 135 - 145 mmol/L    Potassium 3.7 3.6 - 5.5 mmol/L    Chloride 104 96 - 112 mmol/L    Co2 20 20 - 33 mmol/L    Anion Gap 13.0 7.0 - 16.0    Glucose 77 65 - 99 mg/dL    Bun 6 (L) 8 - 22 mg/dL    Creatinine 0.54 0.50 - 1.40 mg/dL    Calcium 8.5 8.5 - 10.5 mg/dL    AST(SGOT) 32 12 - 45 U/L    ALT(SGPT) 71 (H) 2 - 50 U/L    Alkaline Phosphatase 125 (H) 30 - 99 U/L    Total Bilirubin 0.6 0.1 - 1.5 mg/dL    Albumin 3.1 (L) 3.2 - 4.9 g/dL    Total Protein 5.9 (L) 6.0 - 8.2 g/dL    Globulin 2.8 1.9 - 3.5 g/dL    A-G Ratio 1.1 g/dL   ESTIMATED GFR    Collection Time: 20  4:29 PM   Result Value Ref Range    GFR If African American >60 >60 mL/min/1.73 m 2    GFR If Non African American >60 >60 mL/min/1.73 m 2       Assessment:  30 y.o.  @  30w5d  Pregnancy  Indication for care in labor and delivery, antepartum  Cholelithiasis- symptoms resolved  Covid positive    Plan:  Discussed patient with on-call hospitalist who is familiar with this patient.  He states that the minor elevations in her liver enzymes, amylase, and lipase do not concern him.  He states that she will most likely need to arrange to have her gallbladder removed whether that be during pregnancy or in the postpartum period.  He states that there is no reason to continue to monitor her labs at this time.  Therefore, we will discharge patient home with a referral to general surgery and a prescription for Lovenox secondary to coronavirus infection.  We will also arrange for her to have NSTs weekly at the end of the day in the office.

## 2020-09-06 NOTE — PROGRESS NOTES
2030 report from HERI Moody RN. Pt care assumed at this time.  2054 Pt placed on monitors for NST, Discussed discharge instructions from Dr Garza: weekly NST's, referral for surgical consult and lovenox to be picked up at [facility] pharmacy.  2130 D/C'd home in stable condition by private car with SO.

## 2020-09-06 NOTE — PROGRESS NOTES
Dr Garza consulted with hospitalist; akua for pt to go home. To  Lovenox from pharmacy. To have weekly NST at end of day. To notify MD of symptoms returning.

## 2020-11-07 ENCOUNTER — HOSPITAL ENCOUNTER (INPATIENT)
Facility: MEDICAL CENTER | Age: 30
LOS: 2 days | End: 2020-11-09
Attending: OBSTETRICS & GYNECOLOGY | Admitting: OBSTETRICS & GYNECOLOGY

## 2020-11-07 ENCOUNTER — ANESTHESIA (OUTPATIENT)
Dept: OBGYN | Facility: MEDICAL CENTER | Age: 30
End: 2020-11-07

## 2020-11-07 ENCOUNTER — ANESTHESIA EVENT (OUTPATIENT)
Dept: OBGYN | Facility: MEDICAL CENTER | Age: 30
End: 2020-11-07

## 2020-11-07 LAB
APPEARANCE UR: ABNORMAL
BACTERIA #/AREA URNS HPF: ABNORMAL /HPF
BASOPHILS # BLD AUTO: 0.1 % (ref 0–1.8)
BASOPHILS # BLD: 0.01 K/UL (ref 0–0.12)
BILIRUB UR QL STRIP.AUTO: NEGATIVE
COLOR UR: YELLOW
COVID ORDER STATUS COVID19: NORMAL
CRYSTALS AMN MICRO: NORMAL
EOSINOPHIL # BLD AUTO: 0.04 K/UL (ref 0–0.51)
EOSINOPHIL NFR BLD: 0.5 % (ref 0–6.9)
EPI CELLS #/AREA URNS HPF: ABNORMAL /HPF
ERYTHROCYTE [DISTWIDTH] IN BLOOD BY AUTOMATED COUNT: 44.2 FL (ref 35.9–50)
GLUCOSE UR STRIP.AUTO-MCNC: NEGATIVE MG/DL
HCT VFR BLD AUTO: 34.8 % (ref 37–47)
HGB BLD-MCNC: 10.7 G/DL (ref 12–16)
HOLDING TUBE BB 8507: NORMAL
HYALINE CASTS #/AREA URNS LPF: ABNORMAL /LPF
IMM GRANULOCYTES # BLD AUTO: 0.04 K/UL (ref 0–0.11)
IMM GRANULOCYTES NFR BLD AUTO: 0.5 % (ref 0–0.9)
KETONES UR STRIP.AUTO-MCNC: NEGATIVE MG/DL
LEUKOCYTE ESTERASE UR QL STRIP.AUTO: ABNORMAL
LYMPHOCYTES # BLD AUTO: 1.62 K/UL (ref 1–4.8)
LYMPHOCYTES NFR BLD: 20.4 % (ref 22–41)
MCH RBC QN AUTO: 25.4 PG (ref 27–33)
MCHC RBC AUTO-ENTMCNC: 30.7 G/DL (ref 33.6–35)
MCV RBC AUTO: 82.5 FL (ref 81.4–97.8)
MICRO URNS: ABNORMAL
MONOCYTES # BLD AUTO: 0.39 K/UL (ref 0–0.85)
MONOCYTES NFR BLD AUTO: 4.9 % (ref 0–13.4)
NEUTROPHILS # BLD AUTO: 5.86 K/UL (ref 2–7.15)
NEUTROPHILS NFR BLD: 73.6 % (ref 44–72)
NITRITE UR QL STRIP.AUTO: NEGATIVE
NRBC # BLD AUTO: 0 K/UL
NRBC BLD-RTO: 0 /100 WBC
PH UR STRIP.AUTO: 8 [PH] (ref 5–8)
PLATELET # BLD AUTO: 184 K/UL (ref 164–446)
PMV BLD AUTO: 11.7 FL (ref 9–12.9)
PROT UR QL STRIP: NEGATIVE MG/DL
RBC # BLD AUTO: 4.22 M/UL (ref 4.2–5.4)
RBC # URNS HPF: ABNORMAL /HPF
RBC UR QL AUTO: NEGATIVE
SARS-COV+SARS-COV-2 AG RESP QL IA.RAPID: NOTDETECTED
SP GR UR STRIP.AUTO: 1.02
SPECIMEN SOURCE: NORMAL
UROBILINOGEN UR STRIP.AUTO-MCNC: 1 MG/DL
WBC # BLD AUTO: 8 K/UL (ref 4.8–10.8)
WBC #/AREA URNS HPF: ABNORMAL /HPF

## 2020-11-07 PROCEDURE — 160035 HCHG PACU - 1ST 60 MINS PHASE I: Performed by: OBSTETRICS & GYNECOLOGY

## 2020-11-07 PROCEDURE — 89060 EXAM SYNOVIAL FLUID CRYSTALS: CPT

## 2020-11-07 PROCEDURE — A9270 NON-COVERED ITEM OR SERVICE: HCPCS | Performed by: STUDENT IN AN ORGANIZED HEALTH CARE EDUCATION/TRAINING PROGRAM

## 2020-11-07 PROCEDURE — 700102 HCHG RX REV CODE 250 W/ 637 OVERRIDE(OP): Performed by: STUDENT IN AN ORGANIZED HEALTH CARE EDUCATION/TRAINING PROGRAM

## 2020-11-07 PROCEDURE — 160029 HCHG SURGERY MINUTES - 1ST 30 MINS LEVEL 4: Performed by: OBSTETRICS & GYNECOLOGY

## 2020-11-07 PROCEDURE — C9803 HOPD COVID-19 SPEC COLLECT: HCPCS | Performed by: OBSTETRICS & GYNECOLOGY

## 2020-11-07 PROCEDURE — 160002 HCHG RECOVERY MINUTES (STAT): Performed by: OBSTETRICS & GYNECOLOGY

## 2020-11-07 PROCEDURE — 36415 COLL VENOUS BLD VENIPUNCTURE: CPT

## 2020-11-07 PROCEDURE — 59514 CESAREAN DELIVERY ONLY: CPT | Performed by: OBSTETRICS & GYNECOLOGY

## 2020-11-07 PROCEDURE — 700111 HCHG RX REV CODE 636 W/ 250 OVERRIDE (IP): Performed by: STUDENT IN AN ORGANIZED HEALTH CARE EDUCATION/TRAINING PROGRAM

## 2020-11-07 PROCEDURE — 160048 HCHG OR STATISTICAL LEVEL 1-5: Performed by: OBSTETRICS & GYNECOLOGY

## 2020-11-07 PROCEDURE — 160009 HCHG ANES TIME/MIN: Performed by: OBSTETRICS & GYNECOLOGY

## 2020-11-07 PROCEDURE — 700105 HCHG RX REV CODE 258: Performed by: STUDENT IN AN ORGANIZED HEALTH CARE EDUCATION/TRAINING PROGRAM

## 2020-11-07 PROCEDURE — 302449 STATCHG TRIAGE ONLY (STATISTIC)

## 2020-11-07 PROCEDURE — 81001 URINALYSIS AUTO W/SCOPE: CPT

## 2020-11-07 PROCEDURE — 87426 SARSCOV CORONAVIRUS AG IA: CPT

## 2020-11-07 PROCEDURE — 700111 HCHG RX REV CODE 636 W/ 250 OVERRIDE (IP): Performed by: OBSTETRICS & GYNECOLOGY

## 2020-11-07 PROCEDURE — 85025 COMPLETE CBC W/AUTO DIFF WBC: CPT

## 2020-11-07 PROCEDURE — 770002 HCHG ROOM/CARE - OB PRIVATE (112)

## 2020-11-07 PROCEDURE — 502648 HCHG APPLICATOR, EVICEL: Performed by: OBSTETRICS & GYNECOLOGY

## 2020-11-07 PROCEDURE — 700101 HCHG RX REV CODE 250: Performed by: STUDENT IN AN ORGANIZED HEALTH CARE EDUCATION/TRAINING PROGRAM

## 2020-11-07 RX ORDER — MORPHINE SULFATE 0.5 MG/ML
INJECTION, SOLUTION EPIDURAL; INTRATHECAL; INTRAVENOUS
Status: COMPLETED | OUTPATIENT
Start: 2020-11-07 | End: 2020-11-07

## 2020-11-07 RX ORDER — DIPHENHYDRAMINE HYDROCHLORIDE 50 MG/ML
12.5 INJECTION INTRAMUSCULAR; INTRAVENOUS EVERY 6 HOURS PRN
Status: ACTIVE | OUTPATIENT
Start: 2020-11-07 | End: 2020-11-08

## 2020-11-07 RX ORDER — MISOPROSTOL 200 UG/1
800 TABLET ORAL
Status: DISCONTINUED | OUTPATIENT
Start: 2020-11-07 | End: 2020-11-09 | Stop reason: HOSPADM

## 2020-11-07 RX ORDER — SODIUM CHLORIDE, SODIUM GLUCONATE, SODIUM ACETATE, POTASSIUM CHLORIDE AND MAGNESIUM CHLORIDE 526; 502; 368; 37; 30 MG/100ML; MG/100ML; MG/100ML; MG/100ML; MG/100ML
1500 INJECTION, SOLUTION INTRAVENOUS ONCE
Status: COMPLETED | OUTPATIENT
Start: 2020-11-07 | End: 2020-11-07

## 2020-11-07 RX ORDER — METHYLERGONOVINE MALEATE 0.2 MG/ML
0.2 INJECTION INTRAVENOUS
Status: DISCONTINUED | OUTPATIENT
Start: 2020-11-07 | End: 2020-11-09 | Stop reason: HOSPADM

## 2020-11-07 RX ORDER — CEFAZOLIN SODIUM 1 G/3ML
INJECTION, POWDER, FOR SOLUTION INTRAMUSCULAR; INTRAVENOUS PRN
Status: DISCONTINUED | OUTPATIENT
Start: 2020-11-07 | End: 2020-11-07 | Stop reason: SURG

## 2020-11-07 RX ORDER — ONDANSETRON 2 MG/ML
INJECTION INTRAMUSCULAR; INTRAVENOUS PRN
Status: DISCONTINUED | OUTPATIENT
Start: 2020-11-07 | End: 2020-11-07 | Stop reason: SURG

## 2020-11-07 RX ORDER — KETOROLAC TROMETHAMINE 30 MG/ML
30 INJECTION, SOLUTION INTRAMUSCULAR; INTRAVENOUS EVERY 6 HOURS
Status: COMPLETED | OUTPATIENT
Start: 2020-11-08 | End: 2020-11-08

## 2020-11-07 RX ORDER — OXYCODONE HYDROCHLORIDE 5 MG/1
5 TABLET ORAL EVERY 4 HOURS PRN
Status: DISCONTINUED | OUTPATIENT
Start: 2020-11-07 | End: 2020-11-08

## 2020-11-07 RX ORDER — METOCLOPRAMIDE HYDROCHLORIDE 5 MG/ML
10 INJECTION INTRAMUSCULAR; INTRAVENOUS ONCE
Status: COMPLETED | OUTPATIENT
Start: 2020-11-07 | End: 2020-11-07

## 2020-11-07 RX ORDER — DIPHENHYDRAMINE HYDROCHLORIDE 50 MG/ML
25 INJECTION INTRAMUSCULAR; INTRAVENOUS EVERY 6 HOURS PRN
Status: ACTIVE | OUTPATIENT
Start: 2020-11-07 | End: 2020-11-08

## 2020-11-07 RX ORDER — SODIUM CHLORIDE, SODIUM LACTATE, POTASSIUM CHLORIDE, CALCIUM CHLORIDE 600; 310; 30; 20 MG/100ML; MG/100ML; MG/100ML; MG/100ML
INJECTION, SOLUTION INTRAVENOUS CONTINUOUS
Status: DISCONTINUED | OUTPATIENT
Start: 2020-11-07 | End: 2020-11-09 | Stop reason: HOSPADM

## 2020-11-07 RX ORDER — CITRIC ACID/SODIUM CITRATE 334-500MG
30 SOLUTION, ORAL ORAL ONCE
Status: COMPLETED | OUTPATIENT
Start: 2020-11-07 | End: 2020-11-07

## 2020-11-07 RX ORDER — OXYTOCIN 10 [USP'U]/ML
INJECTION, SOLUTION INTRAMUSCULAR; INTRAVENOUS PRN
Status: DISCONTINUED | OUTPATIENT
Start: 2020-11-07 | End: 2020-11-07 | Stop reason: SURG

## 2020-11-07 RX ORDER — DEXAMETHASONE SODIUM PHOSPHATE 4 MG/ML
INJECTION, SOLUTION INTRA-ARTICULAR; INTRALESIONAL; INTRAMUSCULAR; INTRAVENOUS; SOFT TISSUE PRN
Status: DISCONTINUED | OUTPATIENT
Start: 2020-11-07 | End: 2020-11-07 | Stop reason: HOSPADM

## 2020-11-07 RX ORDER — CARBOPROST TROMETHAMINE 250 UG/ML
250 INJECTION, SOLUTION INTRAMUSCULAR
Status: DISCONTINUED | OUTPATIENT
Start: 2020-11-07 | End: 2020-11-09 | Stop reason: HOSPADM

## 2020-11-07 RX ORDER — SODIUM CHLORIDE, SODIUM GLUCONATE, SODIUM ACETATE, POTASSIUM CHLORIDE AND MAGNESIUM CHLORIDE 526; 502; 368; 37; 30 MG/100ML; MG/100ML; MG/100ML; MG/100ML; MG/100ML
INJECTION, SOLUTION INTRAVENOUS
Status: DISCONTINUED | OUTPATIENT
Start: 2020-11-07 | End: 2020-11-07 | Stop reason: SURG

## 2020-11-07 RX ORDER — KETOROLAC TROMETHAMINE 30 MG/ML
INJECTION, SOLUTION INTRAMUSCULAR; INTRAVENOUS PRN
Status: DISCONTINUED | OUTPATIENT
Start: 2020-11-07 | End: 2020-11-07 | Stop reason: SURG

## 2020-11-07 RX ORDER — BUPIVACAINE HYDROCHLORIDE 7.5 MG/ML
INJECTION, SOLUTION INTRASPINAL
Status: COMPLETED | OUTPATIENT
Start: 2020-11-07 | End: 2020-11-07

## 2020-11-07 RX ORDER — ACETAMINOPHEN 500 MG
1000 TABLET ORAL EVERY 6 HOURS
Status: COMPLETED | OUTPATIENT
Start: 2020-11-07 | End: 2020-11-08

## 2020-11-07 RX ORDER — AZITHROMYCIN 500 MG/5ML
500 INJECTION, POWDER, LYOPHILIZED, FOR SOLUTION INTRAVENOUS ONCE
Status: COMPLETED | OUTPATIENT
Start: 2020-11-07 | End: 2020-11-07

## 2020-11-07 RX ORDER — OXYCODONE HYDROCHLORIDE 10 MG/1
10 TABLET ORAL EVERY 4 HOURS PRN
Status: ACTIVE | OUTPATIENT
Start: 2020-11-07 | End: 2020-11-08

## 2020-11-07 RX ORDER — ONDANSETRON 2 MG/ML
4 INJECTION INTRAMUSCULAR; INTRAVENOUS EVERY 6 HOURS PRN
Status: DISPENSED | OUTPATIENT
Start: 2020-11-07 | End: 2020-11-08

## 2020-11-07 RX ORDER — DOCUSATE SODIUM 100 MG/1
100 CAPSULE, LIQUID FILLED ORAL 2 TIMES DAILY PRN
Status: DISCONTINUED | OUTPATIENT
Start: 2020-11-07 | End: 2020-11-09 | Stop reason: HOSPADM

## 2020-11-07 RX ORDER — SODIUM CHLORIDE, SODIUM LACTATE, POTASSIUM CHLORIDE, CALCIUM CHLORIDE 600; 310; 30; 20 MG/100ML; MG/100ML; MG/100ML; MG/100ML
INJECTION, SOLUTION INTRAVENOUS PRN
Status: DISCONTINUED | OUTPATIENT
Start: 2020-11-07 | End: 2020-11-09 | Stop reason: HOSPADM

## 2020-11-07 RX ADMIN — OXYTOCIN 1000 ML: 10 INJECTION, SOLUTION INTRAMUSCULAR; INTRAVENOUS at 19:49

## 2020-11-07 RX ADMIN — CEFAZOLIN 2 G: 330 INJECTION, POWDER, FOR SOLUTION INTRAMUSCULAR; INTRAVENOUS at 19:13

## 2020-11-07 RX ADMIN — FAMOTIDINE 20 MG: 10 INJECTION, SOLUTION INTRAVENOUS at 18:27

## 2020-11-07 RX ADMIN — METOCLOPRAMIDE 10 MG: 5 INJECTION, SOLUTION INTRAMUSCULAR; INTRAVENOUS at 18:27

## 2020-11-07 RX ADMIN — BUPIVACAINE HYDROCHLORIDE IN DEXTROSE 1.5 ML: 7.5 INJECTION, SOLUTION SUBARACHNOID at 19:01

## 2020-11-07 RX ADMIN — ONDANSETRON 4 MG: 2 INJECTION INTRAMUSCULAR; INTRAVENOUS at 23:39

## 2020-11-07 RX ADMIN — ONDANSETRON 4 MG: 2 INJECTION INTRAMUSCULAR; INTRAVENOUS at 19:18

## 2020-11-07 RX ADMIN — FENTANYL CITRATE 10 MCG: 50 INJECTION, SOLUTION INTRAMUSCULAR; INTRAVENOUS at 19:01

## 2020-11-07 RX ADMIN — OXYTOCIN 20 UNITS: 10 INJECTION, SOLUTION INTRAMUSCULAR; INTRAVENOUS at 19:28

## 2020-11-07 RX ADMIN — PHENYLEPHRINE HYDROCHLORIDE 40 MCG/MIN: 10 INJECTION INTRAVENOUS at 19:11

## 2020-11-07 RX ADMIN — DEXAMETHASONE SODIUM PHOSPHATE 4 MG: 4 INJECTION, SOLUTION INTRA-ARTICULAR; INTRALESIONAL; INTRAMUSCULAR; INTRAVENOUS; SOFT TISSUE at 19:18

## 2020-11-07 RX ADMIN — SODIUM CHLORIDE, SODIUM GLUCONATE, SODIUM ACETATE, POTASSIUM CHLORIDE AND MAGNESIUM CHLORIDE: 526; 502; 368; 37; 30 INJECTION, SOLUTION INTRAVENOUS at 18:56

## 2020-11-07 RX ADMIN — SODIUM CITRATE AND CITRIC ACID MONOHYDRATE 30 ML: 500; 334 SOLUTION ORAL at 18:27

## 2020-11-07 RX ADMIN — SODIUM CHLORIDE, SODIUM GLUCONATE, SODIUM ACETATE, POTASSIUM CHLORIDE AND MAGNESIUM CHLORIDE 1500 ML: 526; 502; 368; 37; 30 INJECTION, SOLUTION INTRAVENOUS at 17:15

## 2020-11-07 RX ADMIN — ACETAMINOPHEN 1000 MG: 500 TABLET ORAL at 23:13

## 2020-11-07 RX ADMIN — AZITHROMYCIN FOR INJECTION INJECTION, POWDER, LYOPHILIZED, FOR SOLUTION 500 MG: 500 INJECTION INTRAVENOUS at 19:43

## 2020-11-07 RX ADMIN — KETOROLAC TROMETHAMINE 15 MG: 30 INJECTION, SOLUTION INTRAMUSCULAR at 20:08

## 2020-11-07 RX ADMIN — MORPHINE SULFATE 100 MCG: 0.5 INJECTION, SOLUTION EPIDURAL; INTRATHECAL; INTRAVENOUS at 19:01

## 2020-11-07 SDOH — ECONOMIC STABILITY: FOOD INSECURITY: WITHIN THE PAST 12 MONTHS, THE FOOD YOU BOUGHT JUST DIDN'T LAST AND YOU DIDN'T HAVE MONEY TO GET MORE.: PATIENT DECLINED

## 2020-11-07 SDOH — ECONOMIC STABILITY: FOOD INSECURITY: WITHIN THE PAST 12 MONTHS, YOU WORRIED THAT YOUR FOOD WOULD RUN OUT BEFORE YOU GOT MONEY TO BUY MORE.: PATIENT DECLINED

## 2020-11-07 SDOH — ECONOMIC STABILITY: TRANSPORTATION INSECURITY
IN THE PAST 12 MONTHS, HAS THE LACK OF TRANSPORTATION KEPT YOU FROM MEDICAL APPOINTMENTS OR FROM GETTING MEDICATIONS?: PATIENT DECLINED

## 2020-11-07 SDOH — ECONOMIC STABILITY: TRANSPORTATION INSECURITY
IN THE PAST 12 MONTHS, HAS LACK OF TRANSPORTATION KEPT YOU FROM MEETINGS, WORK, OR FROM GETTING THINGS NEEDED FOR DAILY LIVING?: PATIENT DECLINED

## 2020-11-07 ASSESSMENT — LIFESTYLE VARIABLES
ON A TYPICAL DAY WHEN YOU DRINK ALCOHOL HOW MANY DRINKS DO YOU HAVE: 0
TOTAL SCORE: 0
EVER_SMOKED: NEVER
TOTAL SCORE: 0
EVER HAD A DRINK FIRST THING IN THE MORNING TO STEADY YOUR NERVES TO GET RID OF A HANGOVER: NO
HAVE PEOPLE ANNOYED YOU BY CRITICIZING YOUR DRINKING: NO
HOW MANY TIMES IN THE PAST YEAR HAVE YOU HAD 5 OR MORE DRINKS IN A DAY: 0
ALCOHOL_USE: NO
TOTAL SCORE: 0
CONSUMPTION TOTAL: NEGATIVE
DOES PATIENT WANT TO STOP DRINKING: NO
HAVE YOU EVER FELT YOU SHOULD CUT DOWN ON YOUR DRINKING: NO
AVERAGE NUMBER OF DAYS PER WEEK YOU HAVE A DRINK CONTAINING ALCOHOL: 0
EVER FELT BAD OR GUILTY ABOUT YOUR DRINKING: NO

## 2020-11-07 ASSESSMENT — FIBROSIS 4 INDEX: FIB4 SCORE: 0.51

## 2020-11-07 ASSESSMENT — COPD QUESTIONNAIRES
HAVE YOU SMOKED AT LEAST 100 CIGARETTES IN YOUR ENTIRE LIFE: NO/DON'T KNOW
DO YOU EVER COUGH UP ANY MUCUS OR PHLEGM?: NO/ONLY WITH OCCASIONAL COLDS OR INFECTIONS
IN THE PAST 12 MONTHS DO YOU DO LESS THAN YOU USED TO BECAUSE OF YOUR BREATHING PROBLEMS: DISAGREE/UNSURE
DURING THE PAST 4 WEEKS HOW MUCH DID YOU FEEL SHORT OF BREATH: NONE/LITTLE OF THE TIME
COPD SCREENING SCORE: 0

## 2020-11-07 ASSESSMENT — PAIN DESCRIPTION - PAIN TYPE
TYPE: SURGICAL PAIN
TYPE: SURGICAL PAIN

## 2020-11-07 ASSESSMENT — PAIN SCALES - GENERAL: PAIN_LEVEL: 0

## 2020-11-07 NOTE — H&P
OB H&P:    CC: LOF    HPI:  Ms. Sue Fraser is a 30 y.o.  @ 39w5d by LMP with SUSAN  who presents with loss of fluid. Patient notes that she heard a pop and felt gush of fluid last night at . She has noted intermittent leakage of fluid since then.    Patient with history of COVID infection in early September. Has not taken Lovenox since September.     Patient last ate and drank at 0900 this morning.    Contractions: Yes  , q 10-15 minutes, increasing from last night  Loss of fluid: Yes  , since  yesterday  Vaginal bleeding: Yes  , noted some blood on toilet paper after wiping  Fetal movement: present      PNC with [facility]    PNL:  Rh+, RI, HIV neg, TrepAb neg, HBsAg NR, GC/CT neg/neg  Glucola: none  GBS none      ROS:  Const: denies fevers, general concerns  CV/resp: reports no concerns  GI: denies abd pain, GI concerns  : see HPI  Neuro: denies HA/vision changes    OB History    Para Term  AB Living   5 3 3   1 3   SAB TAB Ectopic Molar Multiple Live Births   1       0 3      # Outcome Date GA Lbr Nacho/2nd Weight Sex Delivery Anes PTL Lv   5 Current            4 Term 19 39w3d  3.91 kg (8 lb 9.9 oz) F CS-LTranv Spinal N ISAIAH   3 Term 12/05/15 39w5d  4.111 kg (9 lb 1 oz) M CS-Unspec Spinal N ISAIAH   2 SAB  10w1d    SAB         Birth Comments: Molar pregnancy with D&C      Complications: Molar pregnancy   1 Term 07 39w0d  3.572 kg (7 lb 14 oz) F Vag-Vacuum EPI N ISAIAH      Birth Comments: Pt. states no complications.        GYN: denies STIs, no cervical procedures    No past medical history on file.    Past Surgical History:   Procedure Laterality Date   • REPEAT C SECTION Bilateral 2019    Procedure:  SECTION, REPEAT;  Surgeon: Eulalio Frazier M.D.;  Location: LABOR AND DELIVERY;  Service: Labor and Delivery   • PRIMARY C SECTION  2015    Procedure: PRIMARY C SECTION;  Surgeon: Lindsey Stein M.D.;  Location: LABOR AND DELIVERY;  Service:    •  DILATION AND CURETTAGE  3/14/2013    Performed by Kj Castrejon M.D. at SURGERY [facility]   • FOOT SURGERY      right foot surgery age 7       No current facility-administered medications on file prior to encounter.      Current Outpatient Medications on File Prior to Encounter   Medication Sig Dispense Refill   • enoxaparin (LOVENOX) 40 MG/0.4ML Solution inj Inject 40 mg as instructed every day for 85 days. 85 Each 3   • docusate sodium 100 MG Cap Take 100 mg by mouth 2 times a day as needed for Constipation. 30 Cap 5   • ferrous sulfate 325 (65 Fe) MG tablet Take 1 Tab by mouth every morning with breakfast. 30 Tab 5   • Prenatal MV-Min-Fe Fum-FA-DHA (PRENATAL 1 PO) Take  by mouth.           No family history on file.    Social History     Tobacco Use   • Smoking status: Never Smoker   • Smokeless tobacco: Never Used   Substance Use Topics   • Alcohol use: No   • Drug use: No         PE:  Vitals:    20 1420   BP: 110/63   Pulse: 83   Resp: 18   Temp: 36.4 °C (97.6 °F)   TempSrc: Temporal   SpO2: 96%   Weight: 74.4 kg (164 lb)   Height: 1.524 m (5')     gen: AAO, NAD  Heart: RRR, no murmur, rub, gallop  Lungs: CTAB, no wheezing, rales, rhonchi  abd: soft, gravid, NT, EFW 3700 g  Ext: NT, no edema    SVE: 1-2/60/-2 @ 1415  FHT: 140/moderate variability/+ accels/ - decels  Krystina: q3-5 minutes contractions    A/P: 30 y.o.  @ 39w5d by LMP with SUSAN  who presents with loss of fluid.  - category 1 tracing  - Positive ferning  - Admit to L&D  - Repeat c/s with bilateral salpingectomy    I saw and examined the patient and discussed the management with the resident. I reviewed the resident's note and agree with the documented findings and plan of care.    Additional attending comments:  Ruptured with hx of previous c/s x2.  Not in labor, though blanca fairly regularly.  Discussed BTL and patient would need to pay out of pocket for surgery.  Patient does not want to have an additional cost for  the BTL and therefore only wants the  section.  I discussed w/ pt risks of surgery including pain, bleeding, infection, risk of damage to intraabdominal structures including bowel/bladder/ureters.  Pt confirms she is accepting of blood transfusion in case of emergency.  All questions answered.      Marylou Ruano D.O.

## 2020-11-07 NOTE — PROGRESS NOTES
EDC- , EGA- 39.5, hx C/S X2    1415- Pt arrived to L&D with c/o LOF since last night at 2100 and UC's that have become stronger/closer together today.  Reports +FM and light pink spotting.  EFM/TOCO applied, VSS.  SSE preformed, no pooling noted.  SVE- 1-2/60/-2, posterior.  Fern sample sent to lab.  1510- Lila positive.  Report to Dr. Nuno, admission orders received.  Covid swab collected, IV started, labs drawn.    1545- Dr. Ruano at bs.  1615- Report to GRISELDA Llanes RN.

## 2020-11-07 NOTE — NON-PROVIDER
"OB H&P:    CC: Contractions and SROM at 2100 2020    HPI:  Ms. Sue Fraser is a 30 y.o.  @ 39w5d by LMP (2/3/2020) with SUSAN of 2020  presents today for SROM and contractions on at 2100 on 2020. She states she felt a \"pop\" and had some leakage of fluid, though she states that the amount of fluid has been tapering down and now reports no current leaking. Denies headaches/vision changes. Denies dysuria, abdominal pain.     Has not had consistent prenatal care, one visit noted on 2020, no encounters prior or afterwards. During the course of this pregnancy she has had Covid 19, diagnosed in September, she states she was asymptomatic but was on Lovenox until September, discontinued because of price of medication. Was cleared by [facility]. She was also hospitalized for possible cholecystitis on 2020.       Contractions: Yes  She states contractions but does not believe them to be regular, estimates them to be 10-15 minutes apart.  Loss of fluid: Yes   Vaginal bleeding: Yes  She states minimal vaginal bleeding after wiping.   Fetal movement: States good fetal movement.       PNC with [facility], inconsistent care    PNL:  Rh+, RI, HIV neg, TrepAb neg, HBsAg NR, GC/CT neg/neg  Glucola: did not complete  GBS did not complete      ROS:  Const: denies fevers, general concerns  CV/resp: reports no concerns  GI: denies abd pain, GI concerns  : see HPI  Neuro: denies HA/vision changes    OB History    Para Term  AB Living   5 3 3   1 3   SAB TAB Ectopic Molar Multiple Live Births   1       0 3      # Outcome Date GA Lbr Nacho/2nd Weight Sex Delivery Anes PTL Lv   5 Current            4 Term 19 39w3d  3.91 kg (8 lb 9.9 oz) F CS-LTranv Spinal N ISAIAH   3 Term 12/05/15 39w5d  4.111 kg (9 lb 1 oz) M CS-Unspec Spinal N ISAIAH   2 SAB  10w1d    SAB         Birth Comments: Molar pregnancy with D&C      Complications: Molar pregnancy   1 Term " "07 39w0d  3.572 kg (7 lb 14 oz) F Vag-Vacuum EPI N ISAIAH      Birth Comments: Pt. states no complications.        GYN: denies STIs, no abnormal pap smears (most recent ), cervical procedures, 2 c-sections  1st : patient states the \"baby's head was too big.\"   2nd : patient states \"did not dilate enough\"    No past medical history on file.    Past Surgical History:   Procedure Laterality Date   • REPEAT C SECTION Bilateral 2019    Procedure:  SECTION, REPEAT;  Surgeon: Eulalio Frazier M.D.;  Location: LABOR AND DELIVERY;  Service: Labor and Delivery   • PRIMARY C SECTION  2015    Procedure: PRIMARY C SECTION;  Surgeon: Lindsey Stein M.D.;  Location: LABOR AND DELIVERY;  Service:    • DILATION AND CURETTAGE  3/14/2013    [facility]   • FOOT SURGERY      right foot surgery age 7       Current Medications:  Prenatal vitamin only     No family history on file.    Social History     Tobacco Use   • Smoking status: Never Smoker   • Smokeless tobacco: Never Used   Substance Use Topics   • Alcohol use: No   • Drug use: No         PE:  Vitals:    20 1420   BP: 110/63   Pulse: 83   Resp: 18   Temp: 36.4 °C (97.6 °F)   TempSrc: Temporal   SpO2: 96%   Weight: 74.4 kg (164 lb)   Height: 1.524 m (5')     gen: AAO, NAD  abd: soft, gravid, NT  Ext: NT, no edema    SVE 1-2/60/-2  FHT: 145-150  Moderate variability, accels +, decels -   lindsey:    A/P:  Ms. Sue Fraser is a 30 y.o.  @ 39w5d by LMP (2/3/2020) with SUSAN of 2020  presents today for SROM and contractions on at 2100 on 2020.     #pregnancy  #  -FHT: category 1  -Positive ferning  -scheduled for  today                "

## 2020-11-08 LAB
ERYTHROCYTE [DISTWIDTH] IN BLOOD BY AUTOMATED COUNT: 45.1 FL (ref 35.9–50)
HCT VFR BLD AUTO: 28.8 % (ref 37–47)
HGB BLD-MCNC: 9 G/DL (ref 12–16)
MCH RBC QN AUTO: 26.3 PG (ref 27–33)
MCHC RBC AUTO-ENTMCNC: 31.3 G/DL (ref 33.6–35)
MCV RBC AUTO: 84.2 FL (ref 81.4–97.8)
PLATELET # BLD AUTO: 156 K/UL (ref 164–446)
PMV BLD AUTO: 11.8 FL (ref 9–12.9)
RBC # BLD AUTO: 3.42 M/UL (ref 4.2–5.4)
WBC # BLD AUTO: 10.6 K/UL (ref 4.8–10.8)

## 2020-11-08 PROCEDURE — 700111 HCHG RX REV CODE 636 W/ 250 OVERRIDE (IP): Performed by: STUDENT IN AN ORGANIZED HEALTH CARE EDUCATION/TRAINING PROGRAM

## 2020-11-08 PROCEDURE — A9270 NON-COVERED ITEM OR SERVICE: HCPCS | Performed by: STUDENT IN AN ORGANIZED HEALTH CARE EDUCATION/TRAINING PROGRAM

## 2020-11-08 PROCEDURE — 770002 HCHG ROOM/CARE - OB PRIVATE (112)

## 2020-11-08 PROCEDURE — 36415 COLL VENOUS BLD VENIPUNCTURE: CPT

## 2020-11-08 PROCEDURE — 700102 HCHG RX REV CODE 250 W/ 637 OVERRIDE(OP): Performed by: STUDENT IN AN ORGANIZED HEALTH CARE EDUCATION/TRAINING PROGRAM

## 2020-11-08 PROCEDURE — 85027 COMPLETE CBC AUTOMATED: CPT

## 2020-11-08 RX ORDER — ACETAMINOPHEN 500 MG
1000 TABLET ORAL EVERY 8 HOURS
Status: DISCONTINUED | OUTPATIENT
Start: 2020-11-09 | End: 2020-11-09 | Stop reason: HOSPADM

## 2020-11-08 RX ORDER — OXYCODONE HYDROCHLORIDE 5 MG/1
5 TABLET ORAL EVERY 4 HOURS PRN
Status: DISCONTINUED | OUTPATIENT
Start: 2020-11-08 | End: 2020-11-09 | Stop reason: HOSPADM

## 2020-11-08 RX ORDER — METOCLOPRAMIDE HYDROCHLORIDE 5 MG/ML
10 INJECTION INTRAMUSCULAR; INTRAVENOUS EVERY 6 HOURS PRN
Status: DISCONTINUED | OUTPATIENT
Start: 2020-11-08 | End: 2020-11-09 | Stop reason: HOSPADM

## 2020-11-08 RX ORDER — IBUPROFEN 800 MG/1
800 TABLET ORAL EVERY 8 HOURS
Status: DISCONTINUED | OUTPATIENT
Start: 2020-11-08 | End: 2020-11-09 | Stop reason: HOSPADM

## 2020-11-08 RX ADMIN — ACETAMINOPHEN 1000 MG: 500 TABLET ORAL at 16:00

## 2020-11-08 RX ADMIN — ACETAMINOPHEN 1000 MG: 500 TABLET ORAL at 10:27

## 2020-11-08 RX ADMIN — KETOROLAC TROMETHAMINE 30 MG: 30 INJECTION, SOLUTION INTRAMUSCULAR at 08:00

## 2020-11-08 RX ADMIN — KETOROLAC TROMETHAMINE 30 MG: 30 INJECTION, SOLUTION INTRAMUSCULAR at 02:57

## 2020-11-08 RX ADMIN — KETOROLAC TROMETHAMINE 30 MG: 30 INJECTION, SOLUTION INTRAMUSCULAR at 14:02

## 2020-11-08 RX ADMIN — ACETAMINOPHEN 1000 MG: 500 TABLET ORAL at 02:59

## 2020-11-08 RX ADMIN — KETOROLAC TROMETHAMINE 30 MG: 30 INJECTION, SOLUTION INTRAMUSCULAR at 20:17

## 2020-11-08 ASSESSMENT — EDINBURGH POSTNATAL DEPRESSION SCALE (EPDS)
THE THOUGHT OF HARMING MYSELF HAS OCCURRED TO ME: NEVER
I HAVE LOOKED FORWARD WITH ENJOYMENT TO THINGS: AS MUCH AS I EVER DID
I HAVE FELT SCARED OR PANICKY FOR NO GOOD REASON: NO, NOT AT ALL
I HAVE BEEN SO UNHAPPY THAT I HAVE HAD DIFFICULTY SLEEPING: NOT AT ALL
I HAVE FELT SAD OR MISERABLE: NO, NOT AT ALL
THINGS HAVE BEEN GETTING ON TOP OF ME: NO, I HAVE BEEN COPING AS WELL AS EVER
I HAVE BLAMED MYSELF UNNECESSARILY WHEN THINGS WENT WRONG: NO, NEVER
I HAVE BEEN ABLE TO LAUGH AND SEE THE FUNNY SIDE OF THINGS: AS MUCH AS I ALWAYS COULD
I HAVE BEEN SO UNHAPPY THAT I HAVE BEEN CRYING: NO, NEVER
I HAVE BEEN ANXIOUS OR WORRIED FOR NO GOOD REASON: NO, NOT AT ALL

## 2020-11-08 ASSESSMENT — PAIN DESCRIPTION - PAIN TYPE
TYPE: SURGICAL PAIN

## 2020-11-08 NOTE — DISCHARGE PLANNING
Discharge Planning Assessment Post Partum    Reason for Referral: One prenatal visit  Address: [address] [place] [address] [place] [address]  Phone: [phone]  Type of Living Situation: living with FOB and children  Mom Diagnosis: Pregnancy, , MOB was COVID+ in September  Baby Diagnosis: [place]-39.5 weeks  Primary Language: French and English    Name of Baby: FOB is deciding on a name and hasn't made a decision yet.  Father of the Baby: Shyam Warren   Involved in baby’s care? Yes  Contact Information: [phone]    Prenatal Care: 1 visit on 20.  MOB states she only had 1 visit because when she tested positive for COVID she was waiting for the Health Department to clear her and stated she could not go to [facility] until she was cleared by the Health Dept.  Mom's PCP: None  PCP for new baby: Pediatrician list provided to mother    Support System: FOB  Coping/Bonding between mother & baby: Yes  Source of Feeding: breast feeding  Supplies for Infant: prepared for infant; denies any needs    Mom's Insurance: None-Notified PFA who will come up to see mother  Baby Covered on Insurance:Not currently  Mother Employed/School: No  Other children in the home/names & ages: 13 year old daughter, 4 year old son, and 1 year old son    Financial Hardship/Income: denies   Mom's Mental status: alert and oriented  Services used prior to admit: plans to apply for WIC    CPS History: No  Psychiatric History: No  Domestic Violence History: No  Drug/ETOH History: No, baby's UDS is negative    Resources Provided: pediatrician list, children and family resource list, and post partum support and counseling resources provided  Referrals Made: diaper bank referral provided     Clearance for Discharge: Infant is cleared to discharge home with parents.

## 2020-11-08 NOTE — ANESTHESIA TIME REPORT
Anesthesia Start and Stop Event Times     Date Time Event    2020 1806 Ready for Procedure     185 Anesthesia Start      Anesthesia Stop        Responsible Staff  20    Name Role Begin End    Min SKIP Waller M.D. Anesth 1856        Preop Diagnosis (Free Text):  Pre-op Diagnosis     Repeat  section, SROM        Preop Diagnosis (Codes):    Post op Diagnosis  Pregnant      Premium Reason  E. Weekend    Comments:

## 2020-11-08 NOTE — ANESTHESIA QCDR
2019 [facility] Clinical Data Registry (for Quality Improvement)     Postoperative nausea/vomiting risk protocol (Adult = 18 yrs and Pediatric 3-17 yrs)- (430 and 463)  General inhalation anesthetic (NOT TIVA) with PONV risk factors: No  Provision of anti-emetic therapy with at least 2 different classes of agents: N/A  Patient DID NOT receive anti-emetic therapy and reason is documented in Medical Record: N/A    Multimodal Pain Management- (477)  Non-emergent surgery AND patient age >= 18: Yes  Use of Multimodal Pain Management, two or more drugs and/or interventions, NOT including systemic opioids: Yes  Exception: Documented allergy to multiple classes of analgesics: N/A    Smoking Abstinence (404)  Patient is current smoker (cigarette, pipe, e-cig, marijuanna): No  Elective Surgery:   Abstinence instructions provided prior to day of surgery:   Patient abstained from smoking on day of surgery:     Pre-Op Beta-Blocker in Isolated CABG (44)  Isolated CABG AND patient age >= 18: No  Beta-blocker admin within 24 hours of surgical incision:   Exception:of medical reason(s) for not administering beta blocker within 24 hours prior to surgical incision (e.g., not  indicated,other medical reason):     PACU assessment of acute postoperative pain prior to Anesthesia Care End- Applies to Patients Age = 18- (ABG7)  Initial PACU pain score is which of the following: < 7/10  Patient unable to report pain score: N/A    Post-anesthetic transfer of care checklist/protocol to PACU/ICU- (426 and 427)  Upon conclusion of case, patient transferred to which of the following locations: PACU/Non-ICU  Use of transfer checklist/protocol: Yes  Exclusion: Service Performed in Patient Hospital Room (and thus did not require transfer): N/A  Unplanned admission to ICU related to anesthesia service up through end of PACU care- (MD51)  Unplanned admission to ICU (not initially anticipated at anesthesia start time): No

## 2020-11-08 NOTE — ANESTHESIA POSTPROCEDURE EVALUATION
Patient: Sue Fraser    Procedure Summary     Date: 20 Room / Location: LND OR 01 / LABOR AND DELIVERY    Anesthesia Start:  Anesthesia Stop:     Procedure:  SECTION, REPEAT (Abdomen) Diagnosis: (Same, delivered)    Surgeons: Marylou Ruano D.O. Responsible Provider: Anmol Waller M.D.    Anesthesia Type: spinal ASA Status: 2          Final Anesthesia Type: spinal  Last vitals  BP   Blood Pressure: 116/63    Temp   36.4 °C (97.6 °F)    Pulse   Pulse: 83   Resp   18    SpO2   99 %      Anesthesia Post Evaluation    Patient location during evaluation: bedside  Patient participation: complete - patient participated  Level of consciousness: awake and alert  Pain score: 0    Airway patency: patent  Anesthetic complications: no  Cardiovascular status: hemodynamically stable  Respiratory status: acceptable  Hydration status: euvolemic    PONV: none    patient able to participate, but full recovery from regional anesthesia has not occurred and is not expected within the stipulated timeframe for the completion of the evaluation       Nurse Pain Score: 0 (NPRS)

## 2020-11-08 NOTE — CARE PLAN
Problem: Potential for postpartum infection related to surgical incision, compromised uterine condition, urinary tract or respiratory compromise  Goal: Patient will be afebrile and free from signs and symptoms of infection  Outcome: PROGRESSING AS EXPECTED     Problem: Potential knowledge deficit related to lack of understanding of self and  care  Goal: Patient will demonstrate ability to care for self and infant  Outcome: PROGRESSING AS EXPECTED

## 2020-11-08 NOTE — CARE PLAN
Problem: Altered physiologic condition related to postoperative  delivery  Goal: Patient physiologically stable as evidenced by normal lochia, palpable uterine involution and vital signs within normal limits  Outcome: PROGRESSING AS EXPECTED  Intervention: Massage fundus as necessary to prevent excessive lochia  Note: Fundus firm. Lochia light. Patient educated to call if saturating a pad in more than hour or for large clots.       Problem: Alteration in comfort related to surgical incision and/or after birth pains  Goal: Patient verbalizes acceptable pain level  Outcome: PROGRESSING AS EXPECTED  Intervention: Assess effectiveness of pain meds within 1 hour of administration  Note: Patient reports 4/10 pain level. Patient to call for pain medication. Multimodal pain plan in place.

## 2020-11-08 NOTE — ANESTHESIA PROCEDURE NOTES
Spinal Block    Date/Time: 11/7/2020 7:01 PM  Performed by: Anmol Waller M.D.  Authorized by: Anmol Waller M.D.     Patient Location:  OR  Start Time:  11/7/2020 7:01 PM  End Time:  11/7/2020 7:09 PM  Reason for Block: primary anesthetic    patient identified, IV checked, site marked, risks and benefits discussed, surgical consent, monitors and equipment checked, pre-op evaluation and timeout performed    Patient Position:  Sitting  Prep: ChloraPrep, patient draped and sterile technique    Monitoring:  Blood pressure, continuous pulse oximetry and heart rate  Approach:  Midline  Location:  L3-4  Injection Technique:  Single-shot  Skin infiltration:  Lidocaine  Strength:  1%  Dose:  3ml  Needle Type:  Pencan  Needle Gauge:  25 G  CSF flowing pre/post injection:  Yes  Sensory Level:  T4

## 2020-11-08 NOTE — OP REPORT
DATE OF SERVICE:   2020     PREOPERATIVE DIAGNOSES:  1.  Intrauterine pregnancy at 39w5d  2.  Previous  section x2  3.  Rupture of membranes     POSTOPERATIVE DIAGNOSES:  1.  Intrauterine pregnancy at 39w5d  2.  same    PROCEDURE PERFORMED:  Repeat low transverse  section.     SURGEON:  Marylou Ruano DO     ASSISTANT: Dr. Land     ANESTHESIA:  Spinal.     ANESTHESIOLOGIST:  Anmol Waller MD     SPECIMEN:  Placenta     ESTIMATED BLOOD LOSS:  500 mL     FINDINGS:  A live viable , weight 3755g, Apgars of 8 and 9 in vertex presentation with thin mec amniotic fluid.  Placenta was intact with 3 vessel cord.  There was a normal uterus, tubes, and ovaries bilaterally.     COMPLICATIONS:  None.     INDICATION FOR PROCEDURE: 29 yo  @ 39+5 with SROM.  Patient had a history of  section x2.  Plan was for repeat  section.  The patient was informed of the risks and benefits of the procedure.  Risks included but were not limited to, bleeding, infection, injury to internal organs, and possible hysterectomy.  The patient expressed understanding the risks involved.  All questions were answered and the patient consented to the procedure.    PROCEDURE:  After appropriate consents were obtained, the patient was taken to the operating room where spinal anesthesia was applied without complications.  The patient was placed in the dorsal supine position with a left tilt of the hips.  The patient was then prepped and draped in the usual sterile manner and prophylactic antibiotics were administered.  Clamp test on the skin verified adequate anesthesia.  A low transverse incision was made with a scalpel and sharp dissection was carried out over subsequent layers of tissue including the fascia using the Bovie electrocautery for hemostasis.  The fascia was incised at the midline and the fascial incision was extended bilaterally using the curved Perry scissors.  The superior edge of the  fascial incision was grasped with the Kocher clamps, tented up, and the underlying rectus muscles were dissected off bluntly and sharply using the curved Perry scissors.  Attention was turned to the inferior edge which was grasped with Kocher clamps tented up and the underlying rectus muscles were dissected bluntly and sharply using the curved Perry scissors.  The rectus muscles were divided at the midline and the peritoneum was identified and entered bluntly.  Care was taken to avoid the bladder as the peritoneal incision was extended superiorly and inferiorly.  The bladder blade was inserted and the vesicouterine peritoneum was identified, elevated, and cut laterally to both sides using the Metzenbaum scissors.  The bladder flap was created using blunt and sharp dissection with the Metzenbaum scissors until the bladder was far inferior from the incision site.  The bladder blade was reinserted and a transverse incision was made in the lower uterine segment using the scalpel.  The uterine incision was extended in a cephalocaudal fashion manually.  The amniotic sac was entered and the fluid was noted to be to have thin meconium.  The surgeon's hand was placed into the uterine cavity and the fetal head was identified in the cephalic position.  The head was elevated to the uterine incision and delivered with the assistance of fundal pressure.  The  was examined for nuchal cord and it was present.  The  was delivered with the assistance of fundal pressure with ease.  On delivery the  was bulb suctioned and the cord was doubly clamped and cut.  The  was passed to the pediatricians for further care.  Cord segment for blood gases were obtained for analysis.  Oxytocin was administered by IV infusion to enhance uterine contraction.  The uterus was cleared of all clots and remaining products of conception.  The hysterotomy incision was reapproximated with 0 Vicryl suture in a running Lembert fashion.   There was an extension into the broad ligament over the peritoneum, but the broad ligament remained intact posteriorly with no damage to the uterine artery.  A figure of 8 was applied at the left corner stitch with surgiflo with excellent hemostasis noted.  The tubes and ovaries were examined and noted to be normal.   The pericolic gutters were examined and any blood clots were removed.  The rectus muscles were examined and hemostatic.  The fascia was reapproximated with 0 Vicryl suture in a running fashion.  The subcutaneous fat was irrigated and any small bleeders were bovied for hemostasis.  The subcutaneous fat was then reapproximated with 3-0 plain gut suture in a running fashion.  The skin was reapproximated with 4-0 Monocryl suture in a running subcuticular stitch. A dressing was placed.  Sponge, needle, instrument, and lap counts were correct x2.  Patient tolerated the procedure well and went to recovery room in stable condition.        ____________________________________     Marylou Ruano DO

## 2020-11-08 NOTE — PROGRESS NOTES
1615 Report received from Krystina ZEPEDA, POC discussed    1730 Pt updated on ETA for C/S. Pt denies any needs at this time.     1854 Pt ambulated to OR 1 2015 Pt stable and transferred to PACU via fabiola    2116 Pt stable and transferred to PP via fabiola    2125 Report given to Luz Maria ZEPEDA, POC discussed.

## 2020-11-08 NOTE — ANESTHESIA PREPROCEDURE EVALUATION
29 yo F w/ hx of C/S x2 here for repeat section. NPO since 9AM. Plt 184. Pt was admitted in September for tx of cholelithiasis and she had positive COVID test (asymptomatic). COVID test neg today. Pt was discharged on Lovenox given her positive COVID results. Pt reports her last Lovenox dose was in September. Plt 184 today. Not on AC currently.    Relevant Problems   NEURO   (+) History of macrosomia in infant in prior pregnancy, currently pregnant   (+) Hx of molar pregnancy, antepartum       Physical Exam    Airway   Mallampati: III  TM distance: >3 FB  Neck ROM: full       Cardiovascular - normal exam  Rhythm: regular  Rate: normal  (-) murmur     Dental - normal exam           Pulmonary - normal exam  Breath sounds clear to auscultation     Abdominal     Comments: Gravid   Neurological - normal exam                 Anesthesia Plan    ASA 2       Plan - spinal   Neuraxial block will be primary anesthetic            Postoperative Plan: Postoperative administration of opioids is intended.    Pertinent diagnostic labs and testing reviewed    Informed Consent:    Anesthetic plan and risks discussed with patient.

## 2020-11-08 NOTE — LACTATION NOTE
This note was copied from a baby's chart.  Mother reports she is able to latch her infant independently without pain or discomfort. She is also supplementing with formula as desired per her feeding plan. She reports she plans to primarily breastfeed and offer formula when she feels her infant is not satisfied. Encouraged breast prior to bottle as often as mother is willing, ensure infant feeds at least every 3 hours or more often for hunger cues. Provided and reviewed supplemental feeding volume guidelines for formula use. Provided list of [facility] clinics if mother is interested in signing up. Mother denies questions/concerns.

## 2020-11-08 NOTE — PROGRESS NOTES
Obstetrics & Gynecology Post-Delivery Progress Note    Date of Service      30 y.o.  s/p  for repeat  Delivery date: 20    Events  No events    Subjective  Pain: No  Bleeding: lochia minimal  Tolerating PO: yes, drinking well has not tried eating yet  Voiding: juarez in place  Ambulating: yes  Passing flatus: No  Feeding: breastfeeding well    Objective  24hr VS:  Temp:  [36 °C (96.8 °F)-36.4 °C (97.6 °F)] 36 °C (96.8 °F)  Pulse:  [68-99] 74  Resp:  [17-18] 17  BP: ()/(52-72) 98/52  SpO2:  [96 %-100 %] 97 %    Physical Exam  General: well  Chest/Breasts: nipples intact   Abdomen: no masses, no organomegaly, nontender, soft  Fundus: firm and below umbilicus  Incision: no significant drainage and no significant erythema  Perineum: deferred  Extremities: symmetric and no edema, calves nontender    Labs:  CBC on  with H/H 10.7/34.8    Medications    •  oxytocin, 2,000 mL/hr, Intravenous, Once    •  ketorolac, 30 mg, Intravenous, Q6HR    •  acetaminophen, 1,000 mg, Oral, Q6HR      misoprostol, methylergonovine, carboPROST, oxyCODONE immediate-release, oxyCODONE immediate release, ondansetron, diphenhydrAMINE, ePHEDrine, diphenhydrAMINE **OR** diphenhydrAMINE **OR** naloxone 0.4 mg in 1000 mL infusion, LR, docusate sodium      Assessment/Plan  Sue S Hong Fraser is a 30 y.o.yo  s/p postop day #1  s/p  for repeat    - Post care: awaiting return of bowel function, awaiting urination without Juarez  - Pain: controlled  - Rh+, Rubella Immune  - Method of Feeding: plans to breastfeed  - Method of Contraception: undecided  VTE prophylaxis: SCDs    - Disposition: likely home postop day 2

## 2020-11-08 NOTE — PROGRESS NOTES
Patient admitted to floor. Patient oriented to floor. Patient educated on plan of care, including, safety, mobility, glucose checks, vital signs, pain management, and medication administration. Patient asked to call for help with next breastfeed. Patient requesting to call for pain medication when needed. Patient has no needs or concerns at this time. Call light within reach. Bands verified.

## 2020-11-09 ENCOUNTER — PHARMACY VISIT (OUTPATIENT)
Dept: PHARMACY | Facility: MEDICAL CENTER | Age: 30
End: 2020-11-09
Payer: COMMERCIAL

## 2020-11-09 VITALS
TEMPERATURE: 96.9 F | BODY MASS INDEX: 32.2 KG/M2 | HEART RATE: 65 BPM | OXYGEN SATURATION: 96 % | DIASTOLIC BLOOD PRESSURE: 76 MMHG | RESPIRATION RATE: 18 BRPM | SYSTOLIC BLOOD PRESSURE: 117 MMHG | HEIGHT: 60 IN | WEIGHT: 164 LBS

## 2020-11-09 PROCEDURE — RXMED WILLOW AMBULATORY MEDICATION CHARGE: Performed by: FAMILY MEDICINE

## 2020-11-09 PROCEDURE — 90471 IMMUNIZATION ADMIN: CPT

## 2020-11-09 PROCEDURE — 700102 HCHG RX REV CODE 250 W/ 637 OVERRIDE(OP): Performed by: OBSTETRICS & GYNECOLOGY

## 2020-11-09 PROCEDURE — 700111 HCHG RX REV CODE 636 W/ 250 OVERRIDE (IP): Performed by: OBSTETRICS & GYNECOLOGY

## 2020-11-09 PROCEDURE — A9270 NON-COVERED ITEM OR SERVICE: HCPCS | Performed by: OBSTETRICS & GYNECOLOGY

## 2020-11-09 PROCEDURE — 3E02340 INTRODUCTION OF INFLUENZA VACCINE INTO MUSCLE, PERCUTANEOUS APPROACH: ICD-10-PCS | Performed by: OBSTETRICS & GYNECOLOGY

## 2020-11-09 PROCEDURE — 90686 IIV4 VACC NO PRSV 0.5 ML IM: CPT | Performed by: OBSTETRICS & GYNECOLOGY

## 2020-11-09 RX ORDER — ACETAMINOPHEN AND CODEINE PHOSPHATE 120; 12 MG/5ML; MG/5ML
1 SOLUTION ORAL DAILY
Qty: 84 TAB | Refills: 4 | Status: SHIPPED | OUTPATIENT
Start: 2020-11-09 | End: 2022-12-19

## 2020-11-09 RX ORDER — HYDROCODONE BITARTRATE AND ACETAMINOPHEN 5; 325 MG/1; MG/1
1 TABLET ORAL EVERY 4 HOURS PRN
Qty: 20 TAB | Refills: 0 | Status: SHIPPED | OUTPATIENT
Start: 2020-11-09 | End: 2020-11-14

## 2020-11-09 RX ORDER — IBUPROFEN 600 MG/1
600 TABLET ORAL EVERY 8 HOURS
Qty: 30 TAB | Refills: 0 | Status: SHIPPED | OUTPATIENT
Start: 2020-11-09 | End: 2020-12-10

## 2020-11-09 RX ADMIN — INFLUENZA A VIRUS A/GUANGDONG-MAONAN/SWL1536/2019 CNIC-1909 (H1N1) ANTIGEN (FORMALDEHYDE INACTIVATED), INFLUENZA A VIRUS A/HONG KONG/2671/2019 (H3N2) ANTIGEN (FORMALDEHYDE INACTIVATED), INFLUENZA B VIRUS B/PHUKET/3073/2013 ANTIGEN (FORMALDEHYDE INACTIVATED), AND INFLUENZA B VIRUS B/WASHINGTON/02/2019 ANTIGEN (FORMALDEHYDE INACTIVATED) 0.5 ML: 15; 15; 15; 15 INJECTION, SUSPENSION INTRAMUSCULAR at 13:03

## 2020-11-09 RX ADMIN — IBUPROFEN 800 MG: 800 TABLET, FILM COATED ORAL at 05:45

## 2020-11-09 RX ADMIN — IBUPROFEN 800 MG: 800 TABLET, FILM COATED ORAL at 13:47

## 2020-11-09 RX ADMIN — DOCUSATE SODIUM 100 MG: 100 CAPSULE, LIQUID FILLED ORAL at 07:58

## 2020-11-09 RX ADMIN — ACETAMINOPHEN 1000 MG: 500 TABLET ORAL at 07:58

## 2020-11-09 ASSESSMENT — PAIN DESCRIPTION - PAIN TYPE
TYPE: SURGICAL PAIN
TYPE: ACUTE PAIN
TYPE: ACUTE PAIN
TYPE: SURGICAL PAIN

## 2020-11-09 NOTE — DISCHARGE INSTRUCTIONS
POSTPARTUM DISCHARGE INSTRUCTIONS FOR MOM    YOB: 1990   Age: 30 y.o.               Admit Date: 2020     Discharge Date: 2020  Attending Doctor:  Marylou Ruano D.O.                  Allergies:  Patient has no known allergies.    Discharged to home by car. Discharged via wheelchair, hospital escort: Yes.  Special equipment needed: Not Applicable  Belongings with: Personal  Be sure to schedule a follow-up appointment with your primary care doctor or any specialists as instructed.     Discharge Plan:   Diet Plan: Discussed  Activity Level: Discussed  Confirmed Follow up Appointment: Patient to Call and Schedule Appointment  Confirmed Symptoms Management: Discussed  Medication Reconciliation Updated: Yes    REASONS TO CALL YOUR OBSTETRICIAN:  1.   Persistent fever or shaking chills (Temperature higher than 100.4)  2.   Heavy bleeding (soaking more than 1 pad per hour); Passing clots  3.   Foul odor from vagina  4.   Mastitis (Breast infection; breast pain, chills, fever, redness)  5.   Urinary pain, burning or frequency  6.   Episiotomy infection  7.   Abdominal incision infection  8.   Severe depression longer than 24 hours    HAND WASHING  · Prior to handling the baby.  · Before breastfeeding or bottle feeding baby.  · After using the bathroom or changing the baby's diaper.    WOUND CARE  Ask your physician for additional care instructions.  In general:    ·  Incision:      · Keep clean and dry.    · Do NOT lift anything heavier than your baby for up to 6 weeks.    · There should not be any opening or pus.      VAGINAL CARE  · Nothing inside vagina for 6 weeks: no sexual intercourse, tampons or douching.  · Bleeding may continue for 2-4 weeks.  Amount may vary.    · Call your physician for heavy bleeding which means soaking more than 1 pad per hour    BIRTH CONTROL  · It is possible to become pregnant at any time after delivery and while breastfeeding.  · Plan to discuss a method of  "birth control with your physician at your follow up visit. visit.    DIET AND ELIMINATION  · Eating more fiber (bran cereal, fruits, and vegetables) and drinking plenty of fluids will help to avoid constipation.  · Urinary frequency after childbirth is normal.    POSTPARTUM BLUES  During the first few days after birth, you may experience a sense of the \"blues\" which may include impatience, irritability or even crying.  These feeling come and go quickly.  However, as many as 1 in 10 women experience emotional symptoms known as postpartum depression.    Postpartum depression:  May start as early as the second or third day after delivery or take several weeks or months to develop.  Symptoms of \"blues\" are present, but are more intense:  Crying spells; loss of appetite; feelings of hopelessness or loss of control; fear of touching the baby; over concern or no concern at all about the baby; little or no concern about your own appearance/caring for yourself; and/or inability to sleep or excessive sleeping.  Contact your physician if you are experiencing any of these symptoms.    Crisis Hotline:  · [place] Crisis Hotline:  5-254-ULRLQZJ  Or 1-851.678.8867  · [place] Crisis Hotline:  1-821.582.2405  Or 739-809-7594    PREVENTING SHAKEN BABY:  If you are angry or stressed, PUT THE BABY IN THE CRIB, step away, take some deep breaths, and wait until you are calm to care for the baby.  DO NOT SHAKE THE BABY.  You are not alone, call a supporter for help.    · Crisis Call Center 24/7 crisis line 759-655-0719 or 1-882.958.1076  · You can also text them, text \"ANSWER\" to 355232    QUIT SMOKING/TOBACCO USE:  I understand the use of any tobacco products increases my chance of suffering from future heart disease and could cause other illnesses which may shorten my life. Quitting the use of tobacco products is the single most important thing I can do to improve my health. For further information on smoking / tobacco cessation call a " Toll Free Quit Line at 1-521.780.1486 (*National Cancer [place]) or 1-263.816.7974 (American Lung Association) or you can access the web based program at www.lungusa.org.    · [place] Tobacco Users Help Line:  (875) 486-9364       Toll Free: 1-556.740.9650  · Quit Tobacco Program [facility] (650)638-6756    DEPRESSION / SUICIDE RISK:  As you are discharged from this [facility], it is important to learn how to keep safe from harming yourself.    Recognize the warning signs:  · Abrupt changes in personality, positive or negative- including increase in energy   · Giving away possessions  · Change in eating patterns- significant weight changes-  positive or negative  · Change in sleeping patterns- unable to sleep or sleeping all the time   · Unwillingness or inability to communicate  · Depression  · Unusual sadness, discouragement and loneliness  · Talk of wanting to die  · Neglect of personal appearance   · Rebelliousness- reckless behavior  · Withdrawal from people/activities they love  · Confusion- inability to concentrate     If you or a loved one observes any of these behaviors or has concerns about self-harm, here's what you can do:  · Talk about it- your feelings and reasons for harming yourself  · Remove any means that you might use to hurt yourself (examples: pills, rope, extension cords, firearm)  · Get professional help from the community (Mental Health, Substance Abuse, psychological counseling)  · Do not be alone:Call your Safe Contact- someone whom you trust who will be there for you.  · Call your local CRISIS HOTLINE 214-2258 or 151-763-4534  · Call your local Children's Mobile Crisis Response Team [place] (847) 671-1512 or www.SpeedDate  · Call the toll free National Suicide Prevention Hotlines   · National Suicide Prevention Lifeline 629-719-EQFR (9896)  · National Hope Line Network 800-SUICIDE (919-8624)    DISCHARGE SURVEY:  Thank you for choosing  [facility].  We hope we provided you with very good care.  You may be receiving a survey in the mail.  Please fill it out.  Your opinion is valuable to us.    ADDITIONAL EDUCATIONAL MATERIALS GIVEN TO PATIENT:  ACTIVITY: Slow increase as tolerated. No lifting heavier than baby. Strict pelvic rest, no intercourse or any object inserted into vagina x 5 weeks.      My signature on this form indicates that:  1.  I have reviewed and understand the above information  2.  My questions regarding this information have been answered to my satisfaction.  3.  I have formulated a plan with my discharge nurse to obtain my prescribed medication for home.

## 2020-11-09 NOTE — PROGRESS NOTES
Pt discharged at approximately 1352 via wheelchair with hospital escort. Infant placed in car seat by parent, and checked by RN.  Pt discharge instructions and prescriptions given to pt. Checked armbands. Clamp and cuddles removed. No further questions at this time.

## 2020-11-09 NOTE — DISCHARGE PLANNING
Medication reconcilliation completed. Medications delivered to patient at bedside. Patient counseled.       Sue Rao S   Home Medication Instructions ROSS:[id number]    Printed on:11/09/20 1402   Medication Information                      HYDROcodone-acetaminophen (NORCO) 5-325 MG Tab per tablet  Take 1 Tab by mouth every four hours as needed for up to 5 days.             ibuprofen (MOTRIN) 600 MG Tab  Take 1 Tab by mouth every 8 hours.             norethindrone (MICRONOR) 0.35 MG tablet  Take 1 Tab by mouth every day.

## 2020-11-09 NOTE — CARE PLAN
Problem: Alteration in comfort related to surgical incision and/or after birth pains  Goal: Patient is able to ambulate, care for self and infant with acceptable pain level  Outcome: PROGRESSING AS EXPECTED  Intervention: Assess 0-10 pain level with vital signs  Note: Patient reports 0/10 pain. Patient is able to care for self and infant. Patient has no issues ambulating.      Problem: Potential anxiety related to difficulty adapting to parental role  Goal: Patient will verbalize and demonstrate effective bonding and parenting behavior  Outcome: PROGRESSING AS EXPECTED  Intervention: Assess patient for anxiety or apprehension regarding parenting role  Note: Patient has multiple children. Patient seen holding and bonding with infant often. No signs of anxiety present at this time.

## 2020-11-09 NOTE — PROGRESS NOTES
Patient educated on plan of care, including,  screening and testing for infant, safety, mobility, pain management, and medication administration. Patient asked to call for help with next breastfeed. Patient requesting to call for pain medication when needed. Patient has no needs or concerns at this time. Call light within reach.

## 2020-11-09 NOTE — DISCHARGE SUMMARY
SECTION DISCHARGE SUMMARY    PATIENT ID:  NAME:  Sue Fraser  MRN:               [id number]  YOB: 1990  DATE OF ADMISSION: 2020   DATE OF DISCHARGE:2020     DISCHARGE DIAGNOSES:  1. Intrauterine gestation at term.  2. Delivered viable female infant.    PROCEDURES PERFORMED:  1. Repeat low transverse  section.    COMPLICATIONS: None.    HOSPITAL COURSE: This is a 30 y.o. year-old female  5, now para 4014, who was admitted at 39w5d for repeat  section. Pregnancy was complicated by COVID-19 and limited prenatal. Her  course was uncomplicated.  [facility] Informed consent was obtained. Under spinal anesthesia an uncomplicated repeat low transverse  section was performed. A viable female infant with Apgars of 8 and 9 was delivered. The patient's postoperative course was uneventful. She remained afebrile with stable vital signs. Pt has been ambulating, tolerating a regular diet and has had return of normal GI function. Pain has been well controlled with opiates, NSAIDs and APAP. The wound is healing nicely.     LABS:   Recent Labs     20  1527 20  0648   WBC 8.0 10.6   RBC 4.22 3.42*   HEMOGLOBIN 10.7* 9.0*   HEMATOCRIT 34.8* 28.8*   MCV 82.5 84.2   MCH 25.4* 26.3*   RDW 44.2 45.1   PLATELETCT 184 156*   MPV 11.7 11.8   NEUTSPOLYS 73.60*  --    LYMPHOCYTES 20.40*  --    MONOCYTES 4.90  --    EOSINOPHILS 0.50  --    BASOPHILS 0.10  --         Sue Rao   Home Medication Instructions ROSS:[id number]    Printed on:2017   Medication Information                      docusate sodium 100 MG Cap  Take 100 mg by mouth 2 times a day as needed for Constipation.             enoxaparin (LOVENOX) 40 MG/0.4ML Solution inj  Inject 40 mg as instructed every day for 85 days.             ferrous sulfate 325 (65 Fe) MG tablet  Take 1 Tab by mouth every morning with breakfast.              HYDROcodone-acetaminophen (NORCO) 5-325 MG Tab per tablet  Take 1 Tab by mouth every four hours as needed for up to 5 days.             ibuprofen (MOTRIN) 600 MG Tab  Take 1 Tab by mouth every 8 hours.             norethindrone (MICRONOR) 0.35 MG tablet  Take 1 Tab by mouth every day.             Prenatal MV-Min-Fe Fum-FA-DHA (PRENATAL 1 PO)  Take  by mouth.                   CONDITION: Stable.    DISPOSITION: Home.    ACTIVITY: Slow increase as tolerated. No lifting heavier than baby. Strict pelvic rest, no intercourse or any object inserted into vagina x 5 weeks.    DIET:  Regular    FOLLOW UP:   1) [facility] and also in 4-6 weeks.

## 2020-11-19 ENCOUNTER — POST PARTUM (OUTPATIENT)
Dept: OBGYN | Facility: CLINIC | Age: 30
End: 2020-11-19

## 2020-11-19 VITALS — BODY MASS INDEX: 28.38 KG/M2 | DIASTOLIC BLOOD PRESSURE: 68 MMHG | SYSTOLIC BLOOD PRESSURE: 126 MMHG | WEIGHT: 145.3 LBS

## 2020-11-19 PROBLEM — U07.1 COVID-19 VIRUS DETECTED: Status: RESOLVED | Noted: 2020-09-02 | Resolved: 2020-11-19

## 2020-11-19 PROBLEM — O09.299 HISTORY OF MACROSOMIA IN INFANT IN PRIOR PREGNANCY, CURRENTLY PREGNANT: Status: RESOLVED | Noted: 2018-12-19 | Resolved: 2020-11-19

## 2020-11-19 PROBLEM — Z3A.30 30 WEEKS GESTATION OF PREGNANCY: Status: RESOLVED | Noted: 2020-09-01 | Resolved: 2020-11-19

## 2020-11-19 PROCEDURE — 99024 POSTOP FOLLOW-UP VISIT: CPT | Performed by: OBSTETRICS & GYNECOLOGY

## 2020-11-19 ASSESSMENT — FIBROSIS 4 INDEX: FIB4 SCORE: 0.71

## 2020-11-19 NOTE — PROGRESS NOTES
Incision Check    CC: s/p c/s incision check    HPI: 30 y.o.  s/p  delivery on   for repeat here for incision check.   Pt reports doing well with minimal pain.  No fevers.  Denies trouble with urination or BM.  Minimal vaginal bleeding.    BF and bottle feeding which is going well.     Denies concerns about mood.   EDPDS:2    LMP 2020 (Exact Date)   Gen: AAO, NAD  Abd: soft, NT, ND, incision C/D/I, healing well    A/P: 30 y.o.  s/p c/s doing well  - no signs of postop complications  - encouraged BFing, lactation visit prn  - no signs of PP depression  - contraception: POP - has rx, instructed on use  - lifting and pelvic rest discussed      RTC for routine postpartum visit in approx 3-4wks

## 2020-11-19 NOTE — PROGRESS NOTES
Pt. here for C/S check delivered on 11/07/2020  Currently : breast and bottle fedding  Pt. States no complaints or concerns today  Post partum visit on 12/10/2020   Chaperone offered and not indicatede

## 2020-12-10 ENCOUNTER — POST PARTUM (OUTPATIENT)
Dept: OBGYN | Facility: CLINIC | Age: 30
End: 2020-12-10

## 2020-12-10 VITALS — SYSTOLIC BLOOD PRESSURE: 104 MMHG | DIASTOLIC BLOOD PRESSURE: 60 MMHG

## 2020-12-10 PROCEDURE — 0503F POSTPARTUM CARE VISIT: CPT | Performed by: NURSE PRACTITIONER

## 2020-12-10 ASSESSMENT — EDINBURGH POSTNATAL DEPRESSION SCALE (EPDS)
I HAVE FELT SAD OR MISERABLE: NO, NOT AT ALL
THINGS HAVE BEEN GETTING ON TOP OF ME: NO, I HAVE BEEN COPING AS WELL AS EVER
I HAVE BEEN SO UNHAPPY THAT I HAVE HAD DIFFICULTY SLEEPING: NOT AT ALL
I HAVE FELT SCARED OR PANICKY FOR NO GOOD REASON: NO, NOT AT ALL
THE THOUGHT OF HARMING MYSELF HAS OCCURRED TO ME: NEVER
I HAVE BEEN ANXIOUS OR WORRIED FOR NO GOOD REASON: NO, NOT AT ALL
TOTAL SCORE: 0
I HAVE LOOKED FORWARD WITH ENJOYMENT TO THINGS: AS MUCH AS I EVER DID
I HAVE BEEN SO UNHAPPY THAT I HAVE BEEN CRYING: NO, NEVER
I HAVE BLAMED MYSELF UNNECESSARILY WHEN THINGS WENT WRONG: NO, NEVER
I HAVE BEEN ABLE TO LAUGH AND SEE THE FUNNY SIDE OF THINGS: AS MUCH AS I ALWAYS COULD

## 2020-12-10 NOTE — PATIENT INSTRUCTIONS
Plan   Plan:    1. Breastfeeding support. Encourage SBE.  2. Continue medications.  3. Contraceptive counseling - follow up here for contraceptive management, otherwise continue POPs  4. Encouraged condom use.  5. Discussed diet, exercise and resumption of normal activities.  6. Gave copy of pap, f/u 3 yr.  7.  F/u c PCP or [facility] clinic as needed for primary care needs.   8.  Encouraged pt to contact provider for baby to f/u missing labs.

## 2020-12-10 NOTE — PROGRESS NOTES
Subjective:    Sue Fraser is a 30 y.o.  female who presents for her postpartum exam. She had RCS without complication. Her prenatal course was uncomplicated. She denies dysuria, vaginal bleeding, odor, itching or breast problems. She is breast and bottlefeeding. She is presently using POPs for her birth control method. Reports no sex prior to this appointment.  Denies any S/S of PP depression.  Missed her window for lab draw for the baby - wants to know what she can do.     Postpartum care            HPI    Review of Systems   All other systems reviewed and are negative.         Objective:     /60   LMP 02/03/2020 (Exact Date)    EPDS: 0    Physical Exam  Vitals signs and nursing note reviewed.   Constitutional:       Appearance: Normal appearance. She is normal weight.   HENT:      Head: Normocephalic and atraumatic.   Neck:      Musculoskeletal: Normal range of motion and neck supple.   Cardiovascular:      Rate and Rhythm: Normal rate and regular rhythm.      Pulses: Normal pulses.      Heart sounds: Normal heart sounds.   Pulmonary:      Effort: Pulmonary effort is normal.      Breath sounds: Normal breath sounds.   Abdominal:      General: Abdomen is flat. Bowel sounds are normal.      Palpations: Abdomen is soft.   Genitourinary:     Comments: Deferred  Musculoskeletal: Normal range of motion.   Skin:     General: Skin is warm and dry.      Capillary Refill: Capillary refill takes less than 2 seconds.      Comments: Incision well healed   Neurological:      General: No focal deficit present.      Mental Status: She is alert and oriented to person, place, and time.   Psychiatric:         Mood and Affect: Mood normal.         Behavior: Behavior normal.         Thought Content: Thought content normal.         Judgment: Judgment normal.            Assessment   Assessment:    1. PP care of lactating women   2. Exam WNL   3. Pap WNL     Patient Active Problem List    Diagnosis Date  Noted   • Acute biliary pancreatitis without infection or necrosis 2020     Priority: High   • Hypokalemia 2020     Priority: Low   • Postpartum care following  delivery 12/10/2020       Plan   Plan:    1. Breastfeeding support. Encourage SBE.  2. Continue medications.  3. Contraceptive counseling - follow up here for contraceptive management, otherwise continue POPs  4. Encouraged condom use.  5. Discussed diet, exercise and resumption of normal activities.  6. Gave copy of pap, f/u 3 yr.  7.  F/u c PCP or [facility] clinic as needed for primary care needs.   8.  Encouraged pt to contact provider for baby to f/u missing labs.

## 2022-10-12 ENCOUNTER — GYNECOLOGY VISIT (OUTPATIENT)
Dept: OBGYN | Facility: CLINIC | Age: 32
End: 2022-10-12

## 2022-10-12 VITALS
BODY MASS INDEX: 31 KG/M2 | HEIGHT: 59 IN | WEIGHT: 153.8 LBS | DIASTOLIC BLOOD PRESSURE: 54 MMHG | SYSTOLIC BLOOD PRESSURE: 108 MMHG

## 2022-10-12 DIAGNOSIS — O34.219 PREVIOUS CESAREAN DELIVERY, ANTEPARTUM: ICD-10-CM

## 2022-10-12 DIAGNOSIS — N91.1 AMENORRHEA, SECONDARY: ICD-10-CM

## 2022-10-12 DIAGNOSIS — N93.8 DUB (DYSFUNCTIONAL UTERINE BLEEDING): ICD-10-CM

## 2022-10-12 PROBLEM — K85.10 ACUTE BILIARY PANCREATITIS WITHOUT INFECTION OR NECROSIS: Status: RESOLVED | Noted: 2020-09-01 | Resolved: 2022-10-12

## 2022-10-12 PROBLEM — E87.6 HYPOKALEMIA: Status: RESOLVED | Noted: 2020-09-01 | Resolved: 2022-10-12

## 2022-10-12 LAB
INT CON NEG: NEGATIVE
INT CON POS: POSITIVE
POC URINE PREGNANCY TEST: POSITIVE

## 2022-10-12 PROCEDURE — 81025 URINE PREGNANCY TEST: CPT | Performed by: OBSTETRICS & GYNECOLOGY

## 2022-10-12 PROCEDURE — 99214 OFFICE O/P EST MOD 30 MIN: CPT | Performed by: OBSTETRICS & GYNECOLOGY

## 2022-10-12 NOTE — PROGRESS NOTES
DUB visit  +UPT today, done in clinic  LMP: 05/24/2022 (exact) with SUSAN of 02/28/2023 GA: 20W1D  Reports +FM  WT: 153.8 lb  BP: 108/54  Preferred pharmacy verified with pt.  Pt states she gets nausea once in awhile. Denies any other complaints or concerns today.  Good # [phone]

## 2022-10-12 NOTE — PROGRESS NOTES
Sue Fraser is a 32 y.o.  female who presents for confirmation of pregnancy as she has not had a preiod in months and thinks she feels FM       HPI: pt taking PNVs. Has had 4 c/ss and a molar pregnancy in . She desires to have her tubes tied with this next c/s and her  might get a vasectomy as well.  Pt without questions or complaints.    Review of Systems:   Pertinent positives documented in HPI and all other systems reviewed & are negative.     All PMH, PSH, allergies, social history and FH reviewed and updated today:  History reviewed. No pertinent past medical history.    Past Surgical History:   Procedure Laterality Date    REPEAT C SECTION  2020    Procedure:  SECTION, REPEAT;  Surgeon: Marylou Ruano D.O.;  Location: LABOR AND DELIVERY;  Service: Obstetrics    REPEAT C SECTION Bilateral 2019    Procedure:  SECTION, REPEAT;  Surgeon: Eulalio Frazier M.D.;  Location: LABOR AND DELIVERY;  Service: Labor and Delivery    PRIMARY C SECTION  2015    Procedure: PRIMARY C SECTION;  Surgeon: Lindsey Stein M.D.;  Location: LABOR AND DELIVERY;  Service:     DILATION AND CURETTAGE  2013    Performed by Kj Castrejon M.D. at SURGERY [facility]    FOOT SURGERY      right foot surgery age 7         Current Outpatient Medications:     Prenatal MV-Min-Fe Fum-FA-DHA (PRENATAL 1 PO), Take  by mouth.  , Taking    norethindrone, 1 Tablet, Oral, DAILY (Patient not taking: Reported on 10/12/2022), Not Taking    Patient has no known allergies.    Social History     Socioeconomic History    Marital status: Single   Tobacco Use    Smoking status: Never    Smokeless tobacco: Never   Vaping Use    Vaping Use: Never used   Substance and Sexual Activity    Alcohol use: No    Drug use: Never    Sexual activity: Yes     Partners: Male     Birth control/protection: None     Comment: Unplanned pregnancy but welcomed. not        Family History  "  Problem Relation Age of Onset    No Known Problems Mother     Stroke Father     No Known Problems Sister     No Known Problems Brother     No Known Problems Brother           Objective:   Vital measurements:  /54 (BP Location: Right arm, Patient Position: Sitting, BP Cuff Size: Adult)   Ht 4' 11.25\"   Wt 153 lb 12.8 oz   Body mass index is 30.8 kg/m². (Goal BM I>18 <25)    Physical Exam:Physical Exam  Constitutional:       Appearance: Normal appearance.   HENT:      Head: Atraumatic.   Eyes:      Extraocular Movements: Extraocular movements intact.      Pupils: Pupils are equal, round, and reactive to light.   Pulmonary:      Effort: Pulmonary effort is normal.   Abdominal:      Palpations: Abdomen is soft.      Comments: FH at umbilicus  Doppler shows FHTs 140s   Musculoskeletal:      Cervical back: Normal range of motion.   Neurological:      General: No focal deficit present.      Mental Status: She is alert and oriented to person, place, and time.   Psychiatric:         Mood and Affect: Mood normal.         Behavior: Behavior normal.        Assessment:     1. DUB (dysfunctional uterine bleeding)  - POCT Pregnancy  2) amenorrhea  3) prev c/s x 4  4) h/o molar pregnancy 2013  5) satisfied parity        Plan:   Follow up for NOB  Formal US ordered  PNLs ordered.  Prec rev'd       "

## 2022-10-14 ENCOUNTER — APPOINTMENT (OUTPATIENT)
Dept: OBGYN | Facility: CLINIC | Age: 32
End: 2022-10-14

## 2022-10-18 ENCOUNTER — APPOINTMENT (OUTPATIENT)
Dept: OBGYN | Facility: CLINIC | Age: 32
End: 2022-10-18

## 2022-11-07 ENCOUNTER — INITIAL PRENATAL (OUTPATIENT)
Dept: OBGYN | Facility: CLINIC | Age: 32
End: 2022-11-07
Payer: MEDICAID

## 2022-11-07 ENCOUNTER — HOSPITAL ENCOUNTER (OUTPATIENT)
Facility: MEDICAL CENTER | Age: 32
End: 2022-11-07
Payer: MEDICAID

## 2022-11-07 VITALS — DIASTOLIC BLOOD PRESSURE: 62 MMHG | SYSTOLIC BLOOD PRESSURE: 98 MMHG | BODY MASS INDEX: 31.52 KG/M2 | WEIGHT: 157.4 LBS

## 2022-11-07 DIAGNOSIS — O34.219 PREVIOUS CESAREAN DELIVERY, ANTEPARTUM: ICD-10-CM

## 2022-11-07 DIAGNOSIS — O09.92 SUPERVISION OF HIGH-RISK PREGNANCY, SECOND TRIMESTER: ICD-10-CM

## 2022-11-07 PROBLEM — O09.90 SUPERVISION OF HIGH-RISK PREGNANCY: Status: ACTIVE | Noted: 2022-11-07

## 2022-11-07 PROBLEM — N93.8 DUB (DYSFUNCTIONAL UTERINE BLEEDING): Status: RESOLVED | Noted: 2022-10-12 | Resolved: 2022-11-07

## 2022-11-07 PROBLEM — N91.1 AMENORRHEA, SECONDARY: Status: RESOLVED | Noted: 2022-10-12 | Resolved: 2022-11-07

## 2022-11-07 PROCEDURE — 87491 CHLMYD TRACH DNA AMP PROBE: CPT

## 2022-11-07 PROCEDURE — 90686 IIV4 VACC NO PRSV 0.5 ML IM: CPT

## 2022-11-07 PROCEDURE — 90471 IMMUNIZATION ADMIN: CPT

## 2022-11-07 PROCEDURE — 87624 HPV HI-RISK TYP POOLED RSLT: CPT

## 2022-11-07 PROCEDURE — 87591 N.GONORRHOEAE DNA AMP PROB: CPT

## 2022-11-07 PROCEDURE — 0500F INITIAL PRENATAL CARE VISIT: CPT

## 2022-11-07 PROCEDURE — 88175 CYTOPATH C/V AUTO FLUID REDO: CPT

## 2022-11-07 ASSESSMENT — EDINBURGH POSTNATAL DEPRESSION SCALE (EPDS)
I HAVE BEEN ANXIOUS OR WORRIED FOR NO GOOD REASON: NO, NOT AT ALL
I HAVE BLAMED MYSELF UNNECESSARILY WHEN THINGS WENT WRONG: NO, NEVER
TOTAL SCORE: 0
THINGS HAVE BEEN GETTING ON TOP OF ME: NO, I HAVE BEEN COPING AS WELL AS EVER
I HAVE FELT SAD OR MISERABLE: NO, NOT AT ALL
I HAVE BEEN ABLE TO LAUGH AND SEE THE FUNNY SIDE OF THINGS: AS MUCH AS I ALWAYS COULD
THE THOUGHT OF HARMING MYSELF HAS OCCURRED TO ME: NEVER
I HAVE BEEN SO UNHAPPY THAT I HAVE HAD DIFFICULTY SLEEPING: NOT AT ALL
I HAVE BEEN SO UNHAPPY THAT I HAVE BEEN CRYING: NO, NEVER
I HAVE FELT SCARED OR PANICKY FOR NO GOOD REASON: NO, NOT AT ALL
I HAVE LOOKED FORWARD WITH ENJOYMENT TO THINGS: AS MUCH AS I EVER DID

## 2022-11-07 NOTE — PROGRESS NOTES
NOB today  LMP:05/17/2022  Last pap: 2019 WNL  Phone # [phone]  Pharmacy confirmed  On PNV  Flu vaccine Desired.   Pt states prenatals are causing nausea, states she takes capsules x 1wk, pt states she can hold them down.   Pt has no other comliants or concerns.   Interested in AFP and BTL.   EPDS score (0) provider notified.

## 2022-11-07 NOTE — PROGRESS NOTES
CC: New Ob visit     Subjective Sue Fraser  24w6d by sure LMP, has had no ultrasound dating. Presents today for prenatal care. Today is her first prenatal visit at [facility].   I have reviewed the patients' medical, surgical, gynecological, obstetrical, social, and family histories, medications and available lab results and pertinent notes are as follows:   No ER visits  No previous care in this pregnancy.   She has complaints of nausea with PNV, o/w no complaints/concerns.    Genetic screening options: Desires AFP.  Declines carrier screening. Declines NIPTs.    Reports active FM. Denies VB, LOF, or cramping.  Denies dysuria, vaginal DC.    Pt is partnered with FOB and lives with him and older children. FOB is involved and supportive. She is currently working as student (), no heavy lifting, no chemical exposure.    Pregnancy is desired.      OB History    Para Term  AB Living   6 4 4 0 1 4   SAB IAB Ectopic Molar Multiple Live Births   1 0 0 0 0 4       Past Gynecological History:  Denies fibroids, ovarian cysts, abnormal pap smears, STDs. She denies ever having surgery on her cervix, uterus, or ovaries in the past.    Last pap smear was 3 years ago per patient, WNL  Past OB hx includes 3 previous cesareans, first for LGA infant. Hx 1 previous VAVD.   History of HSV I or II in self or partner: no  History of STIs in self or partner: no  History of Thyroid problems: no    History of depression or anxiety no      Past Medical History:   Diagnosis Date    Acute biliary pancreatitis without infection or necrosis 2020    Hypokalemia 2020     Past Surgical History:   Procedure Laterality Date    REPEAT C SECTION  2020    Procedure:  SECTION, REPEAT;  Surgeon: Marylou Ruano D.O.;  Location: LABOR AND DELIVERY;  Service: Obstetrics    REPEAT C SECTION Bilateral 2019    Procedure:  SECTION, REPEAT;  Surgeon: Eulalio AUSTIN  MARY Frazier;  Location: LABOR AND DELIVERY;  Service: Labor and Delivery    PRIMARY C SECTION  12/05/2015    Procedure: PRIMARY C SECTION;  Surgeon: Lindsey Stein M.D.;  Location: LABOR AND DELIVERY;  Service:     DILATION AND CURETTAGE  03/14/2013    Performed by Kj Castrejon M.D. at SURGERY [facility]    FOOT SURGERY      right foot surgery age 7      Social History     Socioeconomic History    Marital status: Single     Spouse name: Not on file    Number of children: Not on file    Years of education: Not on file    Highest education level: Not on file   Occupational History    Not on file   Tobacco Use    Smoking status: Never    Smokeless tobacco: Never   Vaping Use    Vaping Use: Never used   Substance and Sexual Activity    Alcohol use: Not Currently    Drug use: Never    Sexual activity: Yes     Partners: Male     Birth control/protection: None     Comment: Unplanned pregnancy but welcomed not    Other Topics Concern    Not on file   Social History Narrative    Not on file     Social Determinants of Health     Financial Resource Strain: Not on file   Food Insecurity: Not on file   Transportation Needs: Not on file   Physical Activity: Not on file   Stress: Not on file   Social Connections: Not on file   Intimate Partner Violence: Not on file   Housing Stability: Not on file     Family History:   Family History   Problem Relation Age of Onset    No Known Problems Mother     Stroke Father     No Known Problems Sister     No Known Problems Brother     No Known Problems Brother        Genetic Screening/Teratology Counseling- Includes patient, baby's father, or anyone in either family with:  Patient's age 35 years or older as of estimated date of delivery: No     Thalassemia (Italian, Greek, Mediterranean, or  background): MCV less than 80: No     Neural tube defect (Meningomyelocele, Spina bifida, or Anencephaly): No     Congenital heart defect: No     Down syndrome: No      Dru-Sachs (Ashkenazi Temple, Cajun, Beninese Ritchie): No     Canavan disease (Ashkenazi Temple): No     Familial dysautonomia (Ashkenazi Temple): No     Sickle cell disease or trait (): No     Hemophilia or other blood disorders: No     Muscular dystrophy: No    Cystic fibrosis: No     [place]'s chorea: No     Mental retardation/autism: No     Other inherited genetic or chromosomal disorder: No     Maternal metabolic disorder (eg. Type 1 diabetes, PKU): No     Patient or baby's father had child with birth defects not listed above: No     Recurrent pregnancy loss, or a stillbirth: No     Medications (including supplements, vitamins, herbs, or OTC drugs)/illicit/recreational drugs/alcohol since last menstrual period: No             Infection Prevention  1. High Risk For HIV: No 6. Rash Or Illness Since LMP: No     2. High Risk For Hepatitis B or C: No 7. History Of STD, GC, Chlamydia, HPV Syphilis: No     3. Live With Someone With TB Or Exposed To TB: No 8. Have a cat in the home?: No     4. Patient Or Partner Has A History Of Herpes: No      5. History of Chicken Pox: Yes (Comment: As a child)    Comments: FOB involved, supportive and living with pt. 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            Objective:   Allergies: Patient has no known allergies.  Objective:BP (!) 98/62   Wt 157 lb 6.4 oz      Prenatal Physical:  General Exam:  HEENT: normal    Heart: normal    Thyroid: normal    Lungs: normal    Lymph Nodes: normal    Neurological: normal    Abdomen: normal    Skin: normal    Extremities: normal    Pelvic Exam:  Vulva:  Normal  Vagina:  Normal  Cervix:  Normal  Uterus (wks):  26     Lab:   Recent Results (from the past 672 hour(s))   POCT Pregnancy    Collection Time: 10/12/22  4:03 PM   Result Value Ref Range    POC Urine Pregnancy Test POSITIVE Negative    Internal Control Positive Positive     Internal Control Negative Negative      Brief BSUS today for femur  length c/w LMP. Full biometry not completed. See scan      Reviewed initial visit notes and SUSAN is by sure LMP, no dating ultrasound done    Assessment:  --Normal Exam at 24w6d  --Size consistent with dates  --FHR positive  Encounter Diagnoses   Name Primary?    Supervision of high-risk pregnancy, second trimester     Previous  delivery x3         Plan:  Reviewed the patients risk factors for this pregnancy and recommend the need for   Complete OB US in: ASAP  Additional labs/imaging: standard  Antepartum fetal monitoring requirements: n/a  2. Genetic screening was discussed with literature on Prenatal Screening, Cystic Fibrosis, and SMA provided and the patient plans to:  - accepted MSAFP, aware it must be collected today  - declined carrier testing  - declined NIPTs  3. Discuss importance of adequate water intake, taking PNV, healthy nutritional choices, and avoidence of ETOH/Tobacco/drugs  4. Discuss importance of exercise, as well as rest   5. If has nausea, take Vitamin B6 25mg TID with doxylamine/Unisom 25mg at night  6. Prenatal labs and UDS ordered - lab slip given  7. Discussed OB care at [facility] including midwifery care, group practice, and call schedules  8. GC/CT via pap today.    9. Pap collected today, no abnormal paps in hx per patient.  10. Pregnancy guide provided and reviewed safe medications in pregnancy page  11. Follow up in  4 weeks for next visit and PRN      Tana Cortes C.N.M.

## 2022-11-08 ENCOUNTER — APPOINTMENT (OUTPATIENT)
Dept: RADIOLOGY | Facility: IMAGING CENTER | Age: 32
End: 2022-11-08

## 2022-11-08 DIAGNOSIS — O09.92 SUPERVISION OF HIGH-RISK PREGNANCY, SECOND TRIMESTER: ICD-10-CM

## 2022-11-08 LAB
C TRACH DNA GENITAL QL NAA+PROBE: NEGATIVE
CYTOLOGY REG CYTOL: NORMAL
HPV HR 12 DNA CVX QL NAA+PROBE: NEGATIVE
HPV16 DNA SPEC QL NAA+PROBE: NEGATIVE
HPV18 DNA SPEC QL NAA+PROBE: NEGATIVE
N GONORRHOEA DNA GENITAL QL NAA+PROBE: NEGATIVE
SPECIMEN SOURCE: NORMAL
SPECIMEN SOURCE: NORMAL

## 2022-11-08 PROCEDURE — 76805 OB US >/= 14 WKS SNGL FETUS: CPT | Mod: TC | Performed by: RADIOLOGY

## 2022-11-14 ENCOUNTER — TELEPHONE (OUTPATIENT)
Dept: OBGYN | Facility: CLINIC | Age: 32
End: 2022-11-14
Payer: MEDICAID

## 2022-11-14 NOTE — TELEPHONE ENCOUNTER
----- Message from Tana Cortes C.N.M. sent at 11/14/2022 12:19 PM PST -----  Please let patient know her pap was normal, due for repeat in 5 years. Did have yeast on the pap and I recommend a 7 day monistat to treat. Thanks    11/14/22  12:56 PM  Left message for pt to call back regarding pap results.     1537   Patient left voicemail to return call     4:51 PM  Left message for pt to call back regarding pap results.

## 2022-11-15 NOTE — TELEPHONE ENCOUNTER
Message from Tana Cortes C.N.M. sent at 11/14/2022 12:19 PM PST -----  Please let patient know her pap was normal, due for repeat in 5 years. Did have yeast on the pap and I recommend a 7 day monistat to treat. Thanks    11/15/22  1149 Left message for pt to call back regarding pap results.   1250 pt called back and notified as above. Advised to use OTC Monistat [id number], advised to avoid intercourse while using cream. Pt agreed and verbalized understanding.

## 2022-12-01 NOTE — PROGRESS NOTES
Pt here today for postpartum exam,delivery type c/s on 11/07/2020  Currently breast and bottle feeding  BCM: pill[facility]   LMP: not yet  Good ph:[phone]  Last pap smear 12/19/2018  Chaperone offered and not indicated         n/a

## 2022-12-05 ENCOUNTER — ROUTINE PRENATAL (OUTPATIENT)
Dept: OBGYN | Facility: CLINIC | Age: 32
End: 2022-12-05
Payer: MEDICAID

## 2022-12-05 VITALS — WEIGHT: 164 LBS | SYSTOLIC BLOOD PRESSURE: 118 MMHG | BODY MASS INDEX: 32.84 KG/M2 | DIASTOLIC BLOOD PRESSURE: 64 MMHG

## 2022-12-05 DIAGNOSIS — O09.93 SUPERVISION OF HIGH-RISK PREGNANCY, THIRD TRIMESTER: ICD-10-CM

## 2022-12-05 PROCEDURE — 90715 TDAP VACCINE 7 YRS/> IM: CPT

## 2022-12-05 PROCEDURE — 90471 IMMUNIZATION ADMIN: CPT

## 2022-12-05 PROCEDURE — 0502F SUBSEQUENT PRENATAL CARE: CPT

## 2022-12-05 NOTE — LETTER
"Count Your Baby's Movements  Another step to a healthy delivery    Sue Moorehailee Fraser             Dept: [phone]    How Many Weeks Pregnant? 28W6D     Date to Begin Counting: Today 12/05/2022              How to use this chart    One way for your physician to keep track of your baby's health is by knowing how often the baby moves (or \"kicks\") in your womb.  You can help your physician to do this by using this chart every day.    Every day, you should see how many hours it takes for your baby to move 10 times.  Start in the morning, as soon as you get up.    · First, write down the time your baby moves until you get to 10.  · Check off one box every time your baby moves until you get to 10.  · Write down the time you finished counting in the last column.  · Total how long it took to count up all 10 movements.  · Finally, fill in the box that shows how long this took.  After counting 10 movements, you no longer have to count any more that day.  The next morning, just start counting again as soon as you get up.    What should you call a \"movement\"?  It is hard to say, because it will feel different from one mother to another and from one pregnancy to the next.  The important thing is that you count the movements the same way throughout your pregnancy.  If you have more questions, you should ask your physician.    Count carefully every day!  SAMPLE:  Week 28    How many hours did it take to feel 10 movements?       Start  Time     1     2     3     4     5     6     7     8     9     10   Finish Time   Mon 8:20 ·  ·  ·  ·  ·  ·  ·  ·  ·  ·  11:40   Tue Wed Thu Fri               Sat               Sun                 IMPORTANT: You should contact your physician if it takes more than two hours for you to feel 10 movements.  Each morning, write down the time and start to count the movements of your baby.  Keep track by checking off one box every time you feel one movement. " " When you have felt 10 \"kicks\", write down the time you finished counting in the last column.  Then fill in the   box (over the check norris) for the number of hours it took.  Be sure to read the complete instructions on the previous page.            "

## 2022-12-05 NOTE — PROGRESS NOTES
S: Sue here for routine prenatal follow up.  Reports good FM.  Denies VB, LOF, RUCs or vaginal DC. No questions or concerns today. Plans repeat c/s with Bilateral salpingectomy - consents today. Reports she has an appt for labs on .    O:    Vitals:    22 1514   BP: 118/64   Weight: 164 lb     See flow sheet    Third trimester labs:  Has not yet collected    A:    IUP at 28w6d  S=D  Patient Active Problem List    Diagnosis Date Noted    Supervision of high-risk pregnancy 2022    Previous  delivery x3 10/12/2022          P:  1.  PP contraception plan: Bilateral salpingectomy.  Consents signed today.         2.  Instructions and handouts given on FKCs.          3.  Questions answered.          4.  Encourage adequate water intake.        5.  Follow up for third trimester labs: TBD, encouraged to collect ASAP, has lab appt for 22. .         6.   labor precautions reviewed.         7.  F/u 2 wks and PRN, request for MD appt for preop counseling        8.  TDap administered today.  Orders Placed This Encounter    Tdap Vaccine =>6YO IM     KRYSTAL Banks.N.M.

## 2022-12-05 NOTE — PROGRESS NOTES
Pt here today for OB follow up  Reports +FM  WT: 164.0 lb  BP: 118/64  Preferred pharmacy verified with pt.  VINAY sheet given and explained today  Tdap vaccine offered today, pt desires vaccine  Pt desires BTL. Consent to be signed today  Pt states no complaints or concerns today  Pt states she has an ppt on 12/14/22 to get her blood work done.  Lab slips reprinted for pt and instructions given.   Keegan # [phone]

## 2022-12-14 ENCOUNTER — HOSPITAL ENCOUNTER (OUTPATIENT)
Dept: LAB | Facility: MEDICAL CENTER | Age: 32
End: 2022-12-14
Payer: MEDICAID

## 2022-12-14 DIAGNOSIS — O09.92 SUPERVISION OF HIGH-RISK PREGNANCY, SECOND TRIMESTER: ICD-10-CM

## 2022-12-14 LAB
ABO GROUP BLD: NORMAL
BLD GP AB SCN SERPL QL: NORMAL
ERYTHROCYTE [DISTWIDTH] IN BLOOD BY AUTOMATED COUNT: 46.3 FL (ref 35.9–50)
GLUCOSE 1H P 50 G GLC PO SERPL-MCNC: 174 MG/DL (ref 70–139)
HBV SURFACE AG SER QL: ABNORMAL
HCT VFR BLD AUTO: 33.1 % (ref 37–47)
HCV AB SER QL: ABNORMAL
HGB BLD-MCNC: 10.1 G/DL (ref 12–16)
HIV 1+2 AB+HIV1 P24 AG SERPL QL IA: NORMAL
MCH RBC QN AUTO: 26.9 PG (ref 27–33)
MCHC RBC AUTO-ENTMCNC: 30.5 G/DL (ref 33.6–35)
MCV RBC AUTO: 88 FL (ref 81.4–97.8)
PLATELET # BLD AUTO: 234 K/UL (ref 164–446)
PMV BLD AUTO: 11.7 FL (ref 9–12.9)
RBC # BLD AUTO: 3.76 M/UL (ref 4.2–5.4)
RH BLD: NORMAL
RUBV AB SER QL: 65.5 IU/ML
T PALLIDUM AB SER QL IA: ABNORMAL
WBC # BLD AUTO: 8 K/UL (ref 4.8–10.8)

## 2022-12-14 PROCEDURE — 36415 COLL VENOUS BLD VENIPUNCTURE: CPT

## 2022-12-14 PROCEDURE — 87077 CULTURE AEROBIC IDENTIFY: CPT

## 2022-12-14 PROCEDURE — 86780 TREPONEMA PALLIDUM: CPT

## 2022-12-14 PROCEDURE — 82950 GLUCOSE TEST: CPT

## 2022-12-14 PROCEDURE — 86901 BLOOD TYPING SEROLOGIC RH(D): CPT

## 2022-12-14 PROCEDURE — 87491 CHLMYD TRACH DNA AMP PROBE: CPT

## 2022-12-14 PROCEDURE — 86900 BLOOD TYPING SEROLOGIC ABO: CPT

## 2022-12-14 PROCEDURE — 87340 HEPATITIS B SURFACE AG IA: CPT

## 2022-12-14 PROCEDURE — 85027 COMPLETE CBC AUTOMATED: CPT

## 2022-12-14 PROCEDURE — 87591 N.GONORRHOEAE DNA AMP PROB: CPT

## 2022-12-14 PROCEDURE — 86850 RBC ANTIBODY SCREEN: CPT

## 2022-12-14 PROCEDURE — 86803 HEPATITIS C AB TEST: CPT

## 2022-12-14 PROCEDURE — 86762 RUBELLA ANTIBODY: CPT

## 2022-12-14 PROCEDURE — 87389 HIV-1 AG W/HIV-1&-2 AB AG IA: CPT

## 2022-12-14 PROCEDURE — 80307 DRUG TEST PRSMV CHEM ANLYZR: CPT

## 2022-12-14 PROCEDURE — 87086 URINE CULTURE/COLONY COUNT: CPT

## 2022-12-16 LAB
AMPHET CTO UR CFM-MCNC: NEGATIVE NG/ML
BACTERIA UR CULT: ABNORMAL
BACTERIA UR CULT: ABNORMAL
BARBITURATES CTO UR CFM-MCNC: NEGATIVE NG/ML
BENZODIAZ CTO UR CFM-MCNC: NEGATIVE NG/ML
CANNABINOIDS CTO UR CFM-MCNC: NEGATIVE NG/ML
COCAINE CTO UR CFM-MCNC: NEGATIVE NG/ML
DRUG COMMENT 753798: NORMAL
METHADONE CTO UR CFM-MCNC: NEGATIVE NG/ML
OPIATES CTO UR CFM-MCNC: NEGATIVE NG/ML
PCP CTO UR CFM-MCNC: NEGATIVE NG/ML
PROPOXYPH CTO UR CFM-MCNC: NEGATIVE NG/ML
SIGNIFICANT IND 70042: ABNORMAL
SITE SITE: ABNORMAL
SOURCE SOURCE: ABNORMAL

## 2022-12-18 LAB
C TRACH DNA SPEC QL NAA+PROBE: NEGATIVE
N GONORRHOEA DNA SPEC QL NAA+PROBE: NEGATIVE
SPECIMEN SOURCE: NORMAL

## 2022-12-19 ENCOUNTER — ROUTINE PRENATAL (OUTPATIENT)
Dept: OBGYN | Facility: CLINIC | Age: 32
End: 2022-12-19
Payer: MEDICAID

## 2022-12-19 VITALS — BODY MASS INDEX: 32.44 KG/M2 | WEIGHT: 162 LBS | DIASTOLIC BLOOD PRESSURE: 60 MMHG | SYSTOLIC BLOOD PRESSURE: 108 MMHG

## 2022-12-19 DIAGNOSIS — Z3A.30 30 WEEKS GESTATION OF PREGNANCY: ICD-10-CM

## 2022-12-19 DIAGNOSIS — R73.02 GLUCOSE INTOLERANCE (IMPAIRED GLUCOSE TOLERANCE): ICD-10-CM

## 2022-12-19 DIAGNOSIS — Z98.891 HISTORY OF 3 CESAREAN SECTIONS: ICD-10-CM

## 2022-12-19 PROCEDURE — 0502F SUBSEQUENT PRENATAL CARE: CPT | Performed by: OBSTETRICS & GYNECOLOGY

## 2022-12-19 NOTE — PROGRESS NOTES
OB Visit Note - 30w6d     MEDICAL DECISION MAKING:  Sue Fraser is a 32 y.o. female  at 30w6d who presents for a repeat OB visit. She has received her flu shot and her Tdap. She is feeling the baby move. She is not leaking fluid. She is not having vaginal bleeding. She is not having contractions. We discussed  at length.     Today's visit addressed:   1. Intrauterine pregnancy  2. History of three prior low transverse  sections  3. Failed one hour GTT  4. Desire for permanent sterilization     Pregnancy complicated by:  Patient Active Problem List   Diagnosis    Previous  delivery x3    Supervision of high-risk pregnancy       Plan:   Request to schedule RCS+BTL sent   labor precautions discussed  3 hour GTT sent, patient to complete as soon as possible  Kick counts    The patient will follow up in 2 week(s). She was counseled to call or return for vaginal bleeding, regular contractions, leakage of fluid or decreased fetal movement.    Iris Treadwell M.D.

## 2022-12-20 ENCOUNTER — TELEPHONE (OUTPATIENT)
Dept: OBGYN | Facility: CLINIC | Age: 32
End: 2022-12-20
Payer: MEDICAID

## 2022-12-20 DIAGNOSIS — R73.09 ELEVATED GLUCOSE TOLERANCE TEST: ICD-10-CM

## 2022-12-20 RX ORDER — FERROUS SULFATE 325(65) MG
325 TABLET ORAL DAILY
Qty: 30 TABLET | Refills: 5 | Status: SHIPPED | OUTPATIENT
Start: 2022-12-20

## 2022-12-20 RX ORDER — NITROFURANTOIN 25; 75 MG/1; MG/1
100 CAPSULE ORAL 2 TIMES DAILY
Qty: 14 CAPSULE | Refills: 0 | Status: SHIPPED | OUTPATIENT
Start: 2022-12-20 | End: 2022-12-27

## 2022-12-21 NOTE — TELEPHONE ENCOUNTER
----- Message from Tana Cortes C.N.M. sent at 2022 11:23 AM PST -----  Please tell the patient the followin. She had a high 1hr GTT, needs 3 hour. Dr. Treadwell already ordered this. Please collect ASAP  2. She has GBS bacteruria. I recommend abx now and we will want to give her abx at the time of delivery if her water breaks (plans )  3. Anemia - ensure she is taking a PNV with iron. I'd also recommend starting additional iron (ie ferrous sulfate 325mg) once daily in addition to her PNV. This should be taken at a different time from her prenatal.     Pt notified of abnormal 1hr gtt and need to do 3hr gtt this time. Pt instructed to fast 10-12hr prior to testing. Pt informed she is only allow to drink plain water during fasting time. Pt will call lab to schedule an appt. Advised to bring a snack for after the test is done. Pt notified will be staying in the labs for the 3hr. Pt agreed to do it but unsure of date. Pt verbalized understanding.  Pt notified of UTI and needs to start on antibiotics, instructions given. Advised pt to increase water intake to minimum of 4 lt of water a day. Rx sent by provider. Pharmacy verified with pt.   Pt notified of need to start on Iron supplementation to take 325 mg daily. Recommended to take it with orange juice/vit c, to avoid dairy products 1hr before and 2hr after. Not to take with PNV. To increase water intake to decrease side effects of constipation. Pt verbalized understanding.

## 2022-12-23 ENCOUNTER — HOSPITAL ENCOUNTER (OUTPATIENT)
Dept: LAB | Facility: MEDICAL CENTER | Age: 32
End: 2022-12-23
Attending: OBSTETRICS & GYNECOLOGY
Payer: MEDICAID

## 2022-12-23 DIAGNOSIS — Z3A.30 30 WEEKS GESTATION OF PREGNANCY: ICD-10-CM

## 2022-12-23 PROCEDURE — 82952 GTT-ADDED SAMPLES: CPT

## 2022-12-23 PROCEDURE — 36415 COLL VENOUS BLD VENIPUNCTURE: CPT

## 2022-12-23 PROCEDURE — 82951 GLUCOSE TOLERANCE TEST (GTT): CPT

## 2022-12-24 LAB
GLUCOSE 1H P CHAL SERPL-MCNC: 172 MG/DL (ref 65–180)
GLUCOSE 2H P CHAL SERPL-MCNC: 161 MG/DL (ref 65–155)
GLUCOSE 3H P CHAL SERPL-MCNC: 116 MG/DL (ref 65–140)
GLUCOSE BS SERPL-MCNC: 80 MG/DL (ref 65–95)

## 2023-01-04 ENCOUNTER — ROUTINE PRENATAL (OUTPATIENT)
Dept: OBGYN | Facility: CLINIC | Age: 33
End: 2023-01-04
Payer: MEDICAID

## 2023-01-04 VITALS — WEIGHT: 163 LBS | BODY MASS INDEX: 32.64 KG/M2

## 2023-01-04 DIAGNOSIS — O34.219 HISTORY OF CESAREAN DELIVERY, CURRENTLY PREGNANT: ICD-10-CM

## 2023-01-04 DIAGNOSIS — Z3A.33 33 WEEKS GESTATION OF PREGNANCY: ICD-10-CM

## 2023-01-04 PROCEDURE — 0502F SUBSEQUENT PRENATAL CARE: CPT | Performed by: OBSTETRICS & GYNECOLOGY

## 2023-01-04 NOTE — PROGRESS NOTES
OB Visit Note - 30w6d     MEDICAL DECISION MAKING:  Sue Fraser is a 32 y.o. female  at 33w1d who presents for a repeat OB visit. She is feeling the baby move. She is not leaking fluid although she reports some increased vaginal discharge. She declines a speculum exam today. She is not having vaginal bleeding. She is not having contractions. She is scheduled for a  + BTL on 23.     Today's visit addressed:   1. Intrauterine pregnancy  2. History of three prior low transverse  sections  3. Failed one hour GTT, one abnormal value on 3 hr GTT. Patient counseled on sensible diet  4. Desire for permanent sterilization     Pregnancy complicated by:  Patient Active Problem List   Diagnosis    Previous  delivery x3    Supervision of high-risk pregnancy    Glucose intolerance (impaired glucose tolerance)    Elevated glucose tolerance test       Plan:   She is scheduled for RCS +BTL    labor precautions discussed  Kick counts  AROM precautions reviewed    The patient will follow up in 2 week(s). She was counseled to call or return for vaginal bleeding, regular contractions, leakage of fluid or decreased fetal movement.    Iris Treadwell M.D.

## 2023-01-04 NOTE — PROGRESS NOTES
OB follow up   + fetal movement.  No VB, LOF or UC's.  Phone # [phone] (home)   Preferred pharmacy confirmed.  C/s is scheduled for 02/14/2022  C/o some LOF

## 2023-01-18 ENCOUNTER — ROUTINE PRENATAL (OUTPATIENT)
Dept: OBGYN | Facility: CLINIC | Age: 33
End: 2023-01-18
Payer: MEDICAID

## 2023-01-18 VITALS — WEIGHT: 164 LBS | DIASTOLIC BLOOD PRESSURE: 60 MMHG | BODY MASS INDEX: 32.84 KG/M2 | SYSTOLIC BLOOD PRESSURE: 100 MMHG

## 2023-01-18 DIAGNOSIS — Z34.83 ENCOUNTER FOR SUPERVISION OF OTHER NORMAL PREGNANCY, THIRD TRIMESTER: ICD-10-CM

## 2023-01-18 PROCEDURE — 0502F SUBSEQUENT PRENATAL CARE: CPT | Performed by: ADVANCED PRACTICE MIDWIFE

## 2023-01-18 NOTE — PROGRESS NOTES
Patient here for GRICEL visit at 35w1d of pregnancy. She affirms fetal movement, denies vaginal bleeding or cramping/regular contractions. She reports overall today she is feeling well and without complaints. No recent ER visits since last seen. Scheduled for c/s on 2/14 with Andrea. GBS at  next visit.  Adequate hydration reviewed in detail with patient. Precautions and warning signs reviewed with patient.  RTC in 1 week(s) for GRICEL visit.

## 2023-01-18 NOTE — PROGRESS NOTES
Pt here today for OB follow up  Pt states no complaints   Reports +FM  Good # [phone]  Pharmacy Confirmed.  Chaperone offered and not indicated.   Patient is scheduled for C/S on 2/14/23 with Dr. Mendez. Pt given instructions.

## 2023-01-25 ENCOUNTER — ROUTINE PRENATAL (OUTPATIENT)
Dept: OBGYN | Facility: CLINIC | Age: 33
End: 2023-01-25
Payer: MEDICAID

## 2023-01-25 ENCOUNTER — HOSPITAL ENCOUNTER (OUTPATIENT)
Facility: MEDICAL CENTER | Age: 33
End: 2023-01-25
Attending: NURSE PRACTITIONER
Payer: MEDICAID

## 2023-01-25 VITALS — BODY MASS INDEX: 33.52 KG/M2 | DIASTOLIC BLOOD PRESSURE: 52 MMHG | WEIGHT: 167.4 LBS | SYSTOLIC BLOOD PRESSURE: 106 MMHG

## 2023-01-25 DIAGNOSIS — O09.93 SUPERVISION OF HIGH RISK PREGNANCY IN THIRD TRIMESTER: Primary | ICD-10-CM

## 2023-01-25 DIAGNOSIS — O09.93 SUPERVISION OF HIGH RISK PREGNANCY IN THIRD TRIMESTER: ICD-10-CM

## 2023-01-25 PROCEDURE — 87081 CULTURE SCREEN ONLY: CPT

## 2023-01-25 PROCEDURE — 87150 DNA/RNA AMPLIFIED PROBE: CPT

## 2023-01-25 PROCEDURE — 0502F SUBSEQUENT PRENATAL CARE: CPT | Performed by: NURSE PRACTITIONER

## 2023-01-25 NOTE — PROGRESS NOTES
Pt. Here for OB/FU. Reports Good FM.   Pt. Denies VB, LOF, or UC's.  Pt states she is having cramping here and there.   GBS today.

## 2023-01-25 NOTE — PROGRESS NOTES
S) Pt is a 32 y.o.   at 36w1d  gestation. Routine prenatal care today. No concerns today. Has  scheduled. All caught up on labs and US, GBS today. Tdap and flu vaccines done. Labor precautions reviewed, all questions answered.    Fetal movement Normal  Cramping no  VB no  LOF no   Denies dysuria. Generally feels well today. Good self-care activities identified. Denies headaches, swelling, visual changes, or epigastric pain .     O) /52   Wt 167 lb 6.4 oz         Labs:       PNL: WNL       GCT: 174, but normal 3 hour        AFP: Not Examined       GBS: collected today       Pertinent ultrasound -        2022- Survey WNL, RJ 19.06cm, c/w prev dating. EFW 79.1%    A) IUP at 36w1d       S=D         Patient Active Problem List    Diagnosis Date Noted    Elevated glucose tolerance test 2022    Glucose intolerance (impaired glucose tolerance) 2022    Supervision of high-risk pregnancy 2022    Previous  delivery x3 10/12/2022          SVE: deferred       Chaperone offered: n/a         TDAP: yes       FLU: yes        BTL: yes       : no       C/S Consent: yes       IOL or C/S scheduled: yes - 2023       LAST PAP: 2022- negative         P) s/s ptl vs general discomforts. Fetal movements reviewed. General ed and anticipatory guidance. Nutrition/exercise/vitamin. Plans breast Plans pp contraception-  BTL with repeat .   Continue PNV.

## 2023-01-26 PROBLEM — B95.1 POSITIVE GBS TEST: Status: ACTIVE | Noted: 2023-01-26

## 2023-01-26 LAB — GP B STREP DNA SPEC QL NAA+PROBE: POSITIVE

## 2023-02-01 ENCOUNTER — ROUTINE PRENATAL (OUTPATIENT)
Dept: OBGYN | Facility: CLINIC | Age: 33
End: 2023-02-01
Payer: MEDICAID

## 2023-02-01 VITALS — WEIGHT: 166 LBS | DIASTOLIC BLOOD PRESSURE: 58 MMHG | SYSTOLIC BLOOD PRESSURE: 100 MMHG | BODY MASS INDEX: 33.24 KG/M2

## 2023-02-01 DIAGNOSIS — O09.93 SUPERVISION OF HIGH RISK PREGNANCY IN THIRD TRIMESTER: ICD-10-CM

## 2023-02-01 DIAGNOSIS — B95.1 POSITIVE GBS TEST: ICD-10-CM

## 2023-02-01 DIAGNOSIS — O34.219 PREVIOUS CESAREAN DELIVERY, ANTEPARTUM: ICD-10-CM

## 2023-02-01 PROCEDURE — 0501F PRENATAL FLOW SHEET: CPT | Performed by: PHYSICIAN ASSISTANT

## 2023-02-01 NOTE — PROGRESS NOTES
S: 32 y.o.  at 37w1d presents for routine obstetric follow-up. Has  scheduled.   Good fetal movement.  No contractions, vaginal bleeding, or leakage of fluid.    Questions answered.    O: /58   Wt 166 lb   LMP 2022 (Exact Date)   BMI 33.24 kg/m²   Patients' weight gain, fluid intake and exercise level discussed.  Vitals, fundal height , fetal position, and FHR reviewed on flowsheet    Failed 1hGTT but passed 3h   TDaP: 22 UTD  GBS: POSITIVE  Rh: Positive  C-sec:  scheduled 23  BTL: consent signed 23    A/P:  32 y.o.  at 37w1d presents for routine obstetric follow-up.  Size equals dates and/or scan    - Continue prenatal vitamins.  - Fetal kick counts.  - Exercise at least 30 minutes daily. Drink at least 3-4L of water daily  - Labor precautions educated.    Follow-up in 1 week.    Gretta Torres P.A.-C.

## 2023-02-01 NOTE — PROGRESS NOTES
Pt. Here for OB/FU. Reports Good FM.   Pt states no complaints.   GBS +   C/S on 2/14/2023 @0930 am, Pt notified and given instructions today.

## 2023-02-07 ENCOUNTER — ROUTINE PRENATAL (OUTPATIENT)
Dept: OBGYN | Facility: CLINIC | Age: 33
End: 2023-02-07
Payer: MEDICAID

## 2023-02-07 VITALS — BODY MASS INDEX: 32.64 KG/M2 | SYSTOLIC BLOOD PRESSURE: 100 MMHG | WEIGHT: 163 LBS | DIASTOLIC BLOOD PRESSURE: 60 MMHG

## 2023-02-07 DIAGNOSIS — Z30.2 REQUEST FOR STERILIZATION: ICD-10-CM

## 2023-02-07 PROCEDURE — 0502F SUBSEQUENT PRENATAL CARE: CPT | Performed by: OBSTETRICS & GYNECOLOGY

## 2023-02-07 NOTE — PROGRESS NOTES
OB Visit Note - 38w0d     MEDICAL DECISION MAKING:  Sue Fraser is a 32 y.o. female  at 38w0d who presents for a return OB visit. She is doing well. She is feeling fetal movement, denies LOF/VB, and she is not having contractions. She would like her cervix checked: /3. Discussed her continued desire for permanent sterilization and pre-op instructions for .     Today's visit addressed:   1. Prenatal care  2. Desire for sterilization  3. Pre-op instructions     Pregnancy complicated by:  Patient Active Problem List   Diagnosis    Previous  delivery x3    Supervision of high-risk pregnancy    Glucose intolerance (impaired glucose tolerance)    Elevated glucose tolerance test    Positive GBS test    Request for sterilization       The patient will follow up in 1 week(s) for scheduled  section . She was counseled to call or return for vaginal bleeding, regular contractions, leakage of fluid or decreased fetal movement.    Iris Treadwell M.D.

## 2023-02-08 ENCOUNTER — PRE-ADMISSION TESTING (OUTPATIENT)
Dept: ADMISSIONS | Facility: MEDICAL CENTER | Age: 33
End: 2023-02-08
Attending: OBSTETRICS & GYNECOLOGY
Payer: MEDICAID

## 2023-02-14 ENCOUNTER — ANESTHESIA (OUTPATIENT)
Dept: OBGYN | Facility: MEDICAL CENTER | Age: 33
End: 2023-02-14
Payer: MEDICAID

## 2023-02-14 ENCOUNTER — ANESTHESIA EVENT (OUTPATIENT)
Dept: OBGYN | Facility: MEDICAL CENTER | Age: 33
End: 2023-02-14
Payer: MEDICAID

## 2023-02-14 ENCOUNTER — HOSPITAL ENCOUNTER (INPATIENT)
Facility: MEDICAL CENTER | Age: 33
LOS: 3 days | End: 2023-02-17
Attending: OBSTETRICS & GYNECOLOGY | Admitting: OBSTETRICS & GYNECOLOGY
Payer: MEDICAID

## 2023-02-14 PROBLEM — R73.09 ELEVATED GLUCOSE TOLERANCE TEST: Status: RESOLVED | Noted: 2022-12-20 | Resolved: 2023-02-14

## 2023-02-14 LAB
ABO GROUP BLD: NORMAL
BASOPHILS # BLD AUTO: 0.4 % (ref 0–1.8)
BASOPHILS # BLD: 0.03 K/UL (ref 0–0.12)
BLD GP AB SCN SERPL QL: NORMAL
EOSINOPHIL # BLD AUTO: 0.05 K/UL (ref 0–0.51)
EOSINOPHIL NFR BLD: 0.7 % (ref 0–6.9)
ERYTHROCYTE [DISTWIDTH] IN BLOOD BY AUTOMATED COUNT: 50.4 FL (ref 35.9–50)
HCT VFR BLD AUTO: 35.3 % (ref 37–47)
HGB BLD-MCNC: 11.5 G/DL (ref 12–16)
IMM GRANULOCYTES # BLD AUTO: 0.05 K/UL (ref 0–0.11)
IMM GRANULOCYTES NFR BLD AUTO: 0.7 % (ref 0–0.9)
LYMPHOCYTES # BLD AUTO: 1.37 K/UL (ref 1–4.8)
LYMPHOCYTES NFR BLD: 18.6 % (ref 22–41)
MCH RBC QN AUTO: 26.4 PG (ref 27–33)
MCHC RBC AUTO-ENTMCNC: 32.6 G/DL (ref 33.6–35)
MCV RBC AUTO: 81 FL (ref 81.4–97.8)
MONOCYTES # BLD AUTO: 0.33 K/UL (ref 0–0.85)
MONOCYTES NFR BLD AUTO: 4.5 % (ref 0–13.4)
NEUTROPHILS # BLD AUTO: 5.55 K/UL (ref 2–7.15)
NEUTROPHILS NFR BLD: 75.1 % (ref 44–72)
NRBC # BLD AUTO: 0 K/UL
NRBC BLD-RTO: 0 /100 WBC
PATHOLOGY CONSULT NOTE: NORMAL
PLATELET # BLD AUTO: 260 K/UL (ref 164–446)
PMV BLD AUTO: 12.1 FL (ref 9–12.9)
RBC # BLD AUTO: 4.36 M/UL (ref 4.2–5.4)
RH BLD: NORMAL
T PALLIDUM AB SER QL IA: NORMAL
WBC # BLD AUTO: 7.4 K/UL (ref 4.8–10.8)

## 2023-02-14 PROCEDURE — A9270 NON-COVERED ITEM OR SERVICE: HCPCS | Performed by: ANESTHESIOLOGY

## 2023-02-14 PROCEDURE — 700111 HCHG RX REV CODE 636 W/ 250 OVERRIDE (IP): Performed by: STUDENT IN AN ORGANIZED HEALTH CARE EDUCATION/TRAINING PROGRAM

## 2023-02-14 PROCEDURE — 86900 BLOOD TYPING SEROLOGIC ABO: CPT

## 2023-02-14 PROCEDURE — 160035 HCHG PACU - 1ST 60 MINS PHASE I: Performed by: OBSTETRICS & GYNECOLOGY

## 2023-02-14 PROCEDURE — 36415 COLL VENOUS BLD VENIPUNCTURE: CPT

## 2023-02-14 PROCEDURE — 0UB70ZZ EXCISION OF BILATERAL FALLOPIAN TUBES, OPEN APPROACH: ICD-10-PCS | Performed by: OBSTETRICS & GYNECOLOGY

## 2023-02-14 PROCEDURE — 160029 HCHG SURGERY MINUTES - 1ST 30 MINS LEVEL 4: Performed by: OBSTETRICS & GYNECOLOGY

## 2023-02-14 PROCEDURE — 59515 CESAREAN DELIVERY: CPT | Performed by: OBSTETRICS & GYNECOLOGY

## 2023-02-14 PROCEDURE — 86780 TREPONEMA PALLIDUM: CPT

## 2023-02-14 PROCEDURE — 700105 HCHG RX REV CODE 258: Performed by: STUDENT IN AN ORGANIZED HEALTH CARE EDUCATION/TRAINING PROGRAM

## 2023-02-14 PROCEDURE — 01961 ANES CESAREAN DELIVERY ONLY: CPT | Performed by: STUDENT IN AN ORGANIZED HEALTH CARE EDUCATION/TRAINING PROGRAM

## 2023-02-14 PROCEDURE — 770002 HCHG ROOM/CARE - OB PRIVATE (112)

## 2023-02-14 PROCEDURE — 86901 BLOOD TYPING SEROLOGIC RH(D): CPT

## 2023-02-14 PROCEDURE — 86850 RBC ANTIBODY SCREEN: CPT

## 2023-02-14 PROCEDURE — 85025 COMPLETE CBC W/AUTO DIFF WBC: CPT

## 2023-02-14 PROCEDURE — C1755 CATHETER, INTRASPINAL: HCPCS | Performed by: OBSTETRICS & GYNECOLOGY

## 2023-02-14 PROCEDURE — 700102 HCHG RX REV CODE 250 W/ 637 OVERRIDE(OP): Performed by: ANESTHESIOLOGY

## 2023-02-14 PROCEDURE — 700105 HCHG RX REV CODE 258

## 2023-02-14 PROCEDURE — A9270 NON-COVERED ITEM OR SERVICE: HCPCS | Performed by: STUDENT IN AN ORGANIZED HEALTH CARE EDUCATION/TRAINING PROGRAM

## 2023-02-14 PROCEDURE — 700102 HCHG RX REV CODE 250 W/ 637 OVERRIDE(OP): Performed by: STUDENT IN AN ORGANIZED HEALTH CARE EDUCATION/TRAINING PROGRAM

## 2023-02-14 PROCEDURE — 58611 LIGATE OVIDUCT(S) ADD-ON: CPT | Performed by: OBSTETRICS & GYNECOLOGY

## 2023-02-14 PROCEDURE — 160009 HCHG ANES TIME/MIN: Performed by: OBSTETRICS & GYNECOLOGY

## 2023-02-14 PROCEDURE — 160041 HCHG SURGERY MINUTES - EA ADDL 1 MIN LEVEL 4: Performed by: OBSTETRICS & GYNECOLOGY

## 2023-02-14 PROCEDURE — 88302 TISSUE EXAM BY PATHOLOGIST: CPT | Mod: 59

## 2023-02-14 PROCEDURE — 700105 HCHG RX REV CODE 258: Performed by: OBSTETRICS & GYNECOLOGY

## 2023-02-14 PROCEDURE — 160048 HCHG OR STATISTICAL LEVEL 1-5: Performed by: OBSTETRICS & GYNECOLOGY

## 2023-02-14 PROCEDURE — 160002 HCHG RECOVERY MINUTES (STAT): Performed by: OBSTETRICS & GYNECOLOGY

## 2023-02-14 PROCEDURE — 160036 HCHG PACU - EA ADDL 30 MINS PHASE I: Performed by: OBSTETRICS & GYNECOLOGY

## 2023-02-14 RX ORDER — HYDROMORPHONE HYDROCHLORIDE 1 MG/ML
0.2 INJECTION, SOLUTION INTRAMUSCULAR; INTRAVENOUS; SUBCUTANEOUS
Status: DISCONTINUED | OUTPATIENT
Start: 2023-02-14 | End: 2023-02-14 | Stop reason: HOSPADM

## 2023-02-14 RX ORDER — METOCLOPRAMIDE HYDROCHLORIDE 5 MG/ML
10 INJECTION INTRAMUSCULAR; INTRAVENOUS ONCE
Status: COMPLETED | OUTPATIENT
Start: 2023-02-14 | End: 2023-02-14

## 2023-02-14 RX ORDER — SODIUM CHLORIDE, SODIUM LACTATE, POTASSIUM CHLORIDE, CALCIUM CHLORIDE 600; 310; 30; 20 MG/100ML; MG/100ML; MG/100ML; MG/100ML
INJECTION, SOLUTION INTRAVENOUS PRN
Status: DISCONTINUED | OUTPATIENT
Start: 2023-02-14 | End: 2023-02-17 | Stop reason: HOSPADM

## 2023-02-14 RX ORDER — OXYTOCIN 10 [USP'U]/ML
10 INJECTION, SOLUTION INTRAMUSCULAR; INTRAVENOUS
Status: DISCONTINUED | OUTPATIENT
Start: 2023-02-14 | End: 2023-02-17 | Stop reason: HOSPADM

## 2023-02-14 RX ORDER — KETOROLAC TROMETHAMINE 30 MG/ML
30 INJECTION, SOLUTION INTRAMUSCULAR; INTRAVENOUS EVERY 6 HOURS
Status: DISPENSED | OUTPATIENT
Start: 2023-02-14 | End: 2023-02-15

## 2023-02-14 RX ORDER — HALOPERIDOL 5 MG/ML
1 INJECTION INTRAMUSCULAR
Status: DISCONTINUED | OUTPATIENT
Start: 2023-02-14 | End: 2023-02-14 | Stop reason: HOSPADM

## 2023-02-14 RX ORDER — DIPHENHYDRAMINE HCL 25 MG
25 TABLET ORAL EVERY 6 HOURS PRN
Status: DISCONTINUED | OUTPATIENT
Start: 2023-02-15 | End: 2023-02-17 | Stop reason: HOSPADM

## 2023-02-14 RX ORDER — DIPHENHYDRAMINE HYDROCHLORIDE 50 MG/ML
25 INJECTION INTRAMUSCULAR; INTRAVENOUS EVERY 6 HOURS PRN
Status: ACTIVE | OUTPATIENT
Start: 2023-02-14 | End: 2023-02-15

## 2023-02-14 RX ORDER — ONDANSETRON 2 MG/ML
4 INJECTION INTRAMUSCULAR; INTRAVENOUS
Status: DISCONTINUED | OUTPATIENT
Start: 2023-02-14 | End: 2023-02-14 | Stop reason: HOSPADM

## 2023-02-14 RX ORDER — MORPHINE SULFATE 0.5 MG/ML
INJECTION, SOLUTION EPIDURAL; INTRATHECAL; INTRAVENOUS
Status: COMPLETED | OUTPATIENT
Start: 2023-02-14 | End: 2023-02-14

## 2023-02-14 RX ORDER — CITRIC ACID/SODIUM CITRATE 334-500MG
30 SOLUTION, ORAL ORAL ONCE
Status: COMPLETED | OUTPATIENT
Start: 2023-02-14 | End: 2023-02-14

## 2023-02-14 RX ORDER — OXYCODONE HCL 5 MG/5 ML
5 SOLUTION, ORAL ORAL
Status: DISCONTINUED | OUTPATIENT
Start: 2023-02-14 | End: 2023-02-14 | Stop reason: HOSPADM

## 2023-02-14 RX ORDER — SODIUM CHLORIDE, SODIUM GLUCONATE, SODIUM ACETATE, POTASSIUM CHLORIDE AND MAGNESIUM CHLORIDE 526; 502; 368; 37; 30 MG/100ML; MG/100ML; MG/100ML; MG/100ML; MG/100ML
INJECTION, SOLUTION INTRAVENOUS
Status: DISCONTINUED | OUTPATIENT
Start: 2023-02-14 | End: 2023-02-14 | Stop reason: SURG

## 2023-02-14 RX ORDER — CEFAZOLIN SODIUM 1 G/3ML
2 INJECTION, POWDER, FOR SOLUTION INTRAMUSCULAR; INTRAVENOUS ONCE
Status: DISCONTINUED | OUTPATIENT
Start: 2023-02-14 | End: 2023-02-14 | Stop reason: HOSPADM

## 2023-02-14 RX ORDER — IBUPROFEN 800 MG/1
800 TABLET ORAL EVERY 8 HOURS PRN
Status: DISCONTINUED | OUTPATIENT
Start: 2023-02-18 | End: 2023-02-17 | Stop reason: HOSPADM

## 2023-02-14 RX ORDER — SODIUM CHLORIDE, SODIUM LACTATE, POTASSIUM CHLORIDE, CALCIUM CHLORIDE 600; 310; 30; 20 MG/100ML; MG/100ML; MG/100ML; MG/100ML
INJECTION, SOLUTION INTRAVENOUS ONCE
Status: COMPLETED | OUTPATIENT
Start: 2023-02-14 | End: 2023-02-15

## 2023-02-14 RX ORDER — ACETAMINOPHEN 500 MG
1000 TABLET ORAL EVERY 6 HOURS PRN
Status: DISCONTINUED | OUTPATIENT
Start: 2023-02-18 | End: 2023-02-17 | Stop reason: HOSPADM

## 2023-02-14 RX ORDER — OXYTOCIN 10 [USP'U]/ML
10 INJECTION, SOLUTION INTRAMUSCULAR; INTRAVENOUS
Status: DISCONTINUED | OUTPATIENT
Start: 2023-02-14 | End: 2023-02-14

## 2023-02-14 RX ORDER — HYDROMORPHONE HYDROCHLORIDE 1 MG/ML
0.2 INJECTION, SOLUTION INTRAMUSCULAR; INTRAVENOUS; SUBCUTANEOUS
Status: ACTIVE | OUTPATIENT
Start: 2023-02-14 | End: 2023-02-15

## 2023-02-14 RX ORDER — OXYCODONE HYDROCHLORIDE 10 MG/1
10 TABLET ORAL EVERY 4 HOURS PRN
Status: DISCONTINUED | OUTPATIENT
Start: 2023-02-15 | End: 2023-02-17 | Stop reason: HOSPADM

## 2023-02-14 RX ORDER — MEPERIDINE HYDROCHLORIDE 25 MG/ML
12.5 INJECTION INTRAMUSCULAR; INTRAVENOUS; SUBCUTANEOUS
Status: DISCONTINUED | OUTPATIENT
Start: 2023-02-14 | End: 2023-02-14 | Stop reason: HOSPADM

## 2023-02-14 RX ORDER — SODIUM CHLORIDE, SODIUM LACTATE, POTASSIUM CHLORIDE, CALCIUM CHLORIDE 600; 310; 30; 20 MG/100ML; MG/100ML; MG/100ML; MG/100ML
INJECTION, SOLUTION INTRAVENOUS CONTINUOUS
Status: DISCONTINUED | OUTPATIENT
Start: 2023-02-14 | End: 2023-02-14 | Stop reason: HOSPADM

## 2023-02-14 RX ORDER — SODIUM CHLORIDE, SODIUM LACTATE, POTASSIUM CHLORIDE, CALCIUM CHLORIDE 600; 310; 30; 20 MG/100ML; MG/100ML; MG/100ML; MG/100ML
INJECTION, SOLUTION INTRAVENOUS CONTINUOUS
Status: DISCONTINUED | OUTPATIENT
Start: 2023-02-14 | End: 2023-02-14

## 2023-02-14 RX ORDER — DIPHENHYDRAMINE HYDROCHLORIDE 50 MG/ML
12.5 INJECTION INTRAMUSCULAR; INTRAVENOUS EVERY 6 HOURS PRN
Status: ACTIVE | OUTPATIENT
Start: 2023-02-14 | End: 2023-02-15

## 2023-02-14 RX ORDER — ONDANSETRON 2 MG/ML
4 INJECTION INTRAMUSCULAR; INTRAVENOUS EVERY 6 HOURS PRN
Status: DISPENSED | OUTPATIENT
Start: 2023-02-14 | End: 2023-02-15

## 2023-02-14 RX ORDER — HYDROMORPHONE HYDROCHLORIDE 1 MG/ML
0.1 INJECTION, SOLUTION INTRAMUSCULAR; INTRAVENOUS; SUBCUTANEOUS
Status: DISCONTINUED | OUTPATIENT
Start: 2023-02-14 | End: 2023-02-14 | Stop reason: HOSPADM

## 2023-02-14 RX ORDER — SODIUM CHLORIDE, SODIUM GLUCONATE, SODIUM ACETATE, POTASSIUM CHLORIDE AND MAGNESIUM CHLORIDE 526; 502; 368; 37; 30 MG/100ML; MG/100ML; MG/100ML; MG/100ML; MG/100ML
1500 INJECTION, SOLUTION INTRAVENOUS ONCE
Status: COMPLETED | OUTPATIENT
Start: 2023-02-14 | End: 2023-02-14

## 2023-02-14 RX ORDER — DIPHENHYDRAMINE HYDROCHLORIDE 50 MG/ML
25 INJECTION INTRAMUSCULAR; INTRAVENOUS EVERY 6 HOURS PRN
Status: DISCONTINUED | OUTPATIENT
Start: 2023-02-15 | End: 2023-02-17 | Stop reason: HOSPADM

## 2023-02-14 RX ORDER — KETOROLAC TROMETHAMINE 30 MG/ML
15 INJECTION, SOLUTION INTRAMUSCULAR; INTRAVENOUS EVERY 6 HOURS PRN
Status: DISCONTINUED | OUTPATIENT
Start: 2023-02-14 | End: 2023-02-15

## 2023-02-14 RX ORDER — ONDANSETRON 4 MG/1
4 TABLET, ORALLY DISINTEGRATING ORAL EVERY 6 HOURS PRN
Status: DISCONTINUED | OUTPATIENT
Start: 2023-02-15 | End: 2023-02-17 | Stop reason: HOSPADM

## 2023-02-14 RX ORDER — OXYCODONE HYDROCHLORIDE 5 MG/1
5 TABLET ORAL EVERY 4 HOURS PRN
Status: DISCONTINUED | OUTPATIENT
Start: 2023-02-15 | End: 2023-02-17 | Stop reason: HOSPADM

## 2023-02-14 RX ORDER — DIPHENHYDRAMINE HYDROCHLORIDE 50 MG/ML
12.5 INJECTION INTRAMUSCULAR; INTRAVENOUS
Status: DISCONTINUED | OUTPATIENT
Start: 2023-02-14 | End: 2023-02-14 | Stop reason: HOSPADM

## 2023-02-14 RX ORDER — ONDANSETRON 2 MG/ML
4 INJECTION INTRAMUSCULAR; INTRAVENOUS EVERY 6 HOURS PRN
Status: DISCONTINUED | OUTPATIENT
Start: 2023-02-15 | End: 2023-02-17 | Stop reason: HOSPADM

## 2023-02-14 RX ORDER — CEFAZOLIN SODIUM 1 G/3ML
INJECTION, POWDER, FOR SOLUTION INTRAMUSCULAR; INTRAVENOUS PRN
Status: DISCONTINUED | OUTPATIENT
Start: 2023-02-14 | End: 2023-02-14 | Stop reason: SURG

## 2023-02-14 RX ORDER — HYDROMORPHONE HYDROCHLORIDE 1 MG/ML
0.4 INJECTION, SOLUTION INTRAMUSCULAR; INTRAVENOUS; SUBCUTANEOUS
Status: DISCONTINUED | OUTPATIENT
Start: 2023-02-14 | End: 2023-02-14 | Stop reason: HOSPADM

## 2023-02-14 RX ORDER — OXYCODONE HYDROCHLORIDE 5 MG/1
5 TABLET ORAL EVERY 4 HOURS PRN
Status: ACTIVE | OUTPATIENT
Start: 2023-02-14 | End: 2023-02-15

## 2023-02-14 RX ORDER — CEFAZOLIN SODIUM 1 G/3ML
2 INJECTION, POWDER, FOR SOLUTION INTRAMUSCULAR; INTRAVENOUS ONCE
Status: DISCONTINUED | OUTPATIENT
Start: 2023-02-14 | End: 2023-02-14

## 2023-02-14 RX ORDER — DOCUSATE SODIUM 100 MG/1
100 CAPSULE, LIQUID FILLED ORAL 2 TIMES DAILY PRN
Status: DISCONTINUED | OUTPATIENT
Start: 2023-02-14 | End: 2023-02-17 | Stop reason: HOSPADM

## 2023-02-14 RX ORDER — KETOROLAC TROMETHAMINE 30 MG/ML
30 INJECTION, SOLUTION INTRAMUSCULAR; INTRAVENOUS ONCE
Status: COMPLETED | OUTPATIENT
Start: 2023-02-14 | End: 2023-02-14

## 2023-02-14 RX ORDER — SODIUM CHLORIDE, SODIUM LACTATE, POTASSIUM CHLORIDE, CALCIUM CHLORIDE 600; 310; 30; 20 MG/100ML; MG/100ML; MG/100ML; MG/100ML
INJECTION, SOLUTION INTRAVENOUS ONCE
Status: DISCONTINUED | OUTPATIENT
Start: 2023-02-14 | End: 2023-02-14

## 2023-02-14 RX ORDER — OXYCODONE HCL 5 MG/5 ML
10 SOLUTION, ORAL ORAL
Status: DISCONTINUED | OUTPATIENT
Start: 2023-02-14 | End: 2023-02-14 | Stop reason: HOSPADM

## 2023-02-14 RX ORDER — SODIUM CHLORIDE, SODIUM LACTATE, POTASSIUM CHLORIDE, AND CALCIUM CHLORIDE .6; .31; .03; .02 G/100ML; G/100ML; G/100ML; G/100ML
500 INJECTION, SOLUTION INTRAVENOUS ONCE
Status: COMPLETED | OUTPATIENT
Start: 2023-02-14 | End: 2023-02-14

## 2023-02-14 RX ORDER — IBUPROFEN 800 MG/1
800 TABLET ORAL EVERY 8 HOURS
Status: DISCONTINUED | OUTPATIENT
Start: 2023-02-15 | End: 2023-02-17 | Stop reason: HOSPADM

## 2023-02-14 RX ORDER — ACETAMINOPHEN 500 MG
1000 TABLET ORAL EVERY 6 HOURS
Status: COMPLETED | OUTPATIENT
Start: 2023-02-14 | End: 2023-02-15

## 2023-02-14 RX ORDER — ACETAMINOPHEN 500 MG
1000 TABLET ORAL EVERY 6 HOURS
Status: DISCONTINUED | OUTPATIENT
Start: 2023-02-15 | End: 2023-02-17 | Stop reason: HOSPADM

## 2023-02-14 RX ORDER — HYDRALAZINE HYDROCHLORIDE 20 MG/ML
5 INJECTION INTRAMUSCULAR; INTRAVENOUS
Status: DISCONTINUED | OUTPATIENT
Start: 2023-02-14 | End: 2023-02-14 | Stop reason: HOSPADM

## 2023-02-14 RX ORDER — SCOLOPAMINE TRANSDERMAL SYSTEM 1 MG/1
1 PATCH, EXTENDED RELEASE TRANSDERMAL
Status: DISCONTINUED | OUTPATIENT
Start: 2023-02-14 | End: 2023-02-17 | Stop reason: HOSPADM

## 2023-02-14 RX ORDER — OXYTOCIN 10 [USP'U]/ML
INJECTION, SOLUTION INTRAMUSCULAR; INTRAVENOUS PRN
Status: DISCONTINUED | OUTPATIENT
Start: 2023-02-14 | End: 2023-02-14 | Stop reason: SURG

## 2023-02-14 RX ORDER — PHENYLEPHRINE HCL IN 0.9% NACL 0.5 MG/5ML
SYRINGE (ML) INTRAVENOUS PRN
Status: DISCONTINUED | OUTPATIENT
Start: 2023-02-14 | End: 2023-02-14 | Stop reason: SURG

## 2023-02-14 RX ADMIN — SODIUM CHLORIDE, POTASSIUM CHLORIDE, SODIUM LACTATE AND CALCIUM CHLORIDE 500 ML: 600; 310; 30; 20 INJECTION, SOLUTION INTRAVENOUS at 18:53

## 2023-02-14 RX ADMIN — KETOROLAC TROMETHAMINE 30 MG: 30 INJECTION, SOLUTION INTRAMUSCULAR; INTRAVENOUS at 18:42

## 2023-02-14 RX ADMIN — Medication 100 MCG: at 10:04

## 2023-02-14 RX ADMIN — FENTANYL CITRATE 10 MCG: 50 INJECTION, SOLUTION INTRAMUSCULAR; INTRAVENOUS at 09:38

## 2023-02-14 RX ADMIN — OXYTOCIN 20 UNITS: 10 INJECTION, SOLUTION INTRAMUSCULAR; INTRAVENOUS at 10:09

## 2023-02-14 RX ADMIN — SODIUM CHLORIDE, SODIUM GLUCONATE, SODIUM ACETATE, POTASSIUM CHLORIDE AND MAGNESIUM CHLORIDE: 526; 502; 368; 37; 30 INJECTION, SOLUTION INTRAVENOUS at 09:34

## 2023-02-14 RX ADMIN — Medication 200 MCG: at 10:18

## 2023-02-14 RX ADMIN — OXYTOCIN 500 ML: 10 INJECTION, SOLUTION INTRAMUSCULAR; INTRAVENOUS at 10:44

## 2023-02-14 RX ADMIN — Medication 200 MCG: at 09:45

## 2023-02-14 RX ADMIN — SODIUM CHLORIDE, SODIUM GLUCONATE, SODIUM ACETATE, POTASSIUM CHLORIDE AND MAGNESIUM CHLORIDE: 526; 502; 368; 37; 30 INJECTION, SOLUTION INTRAVENOUS at 10:10

## 2023-02-14 RX ADMIN — MORPHINE SULFATE 150 MCG: 0.5 INJECTION, SOLUTION EPIDURAL; INTRATHECAL; INTRAVENOUS at 09:38

## 2023-02-14 RX ADMIN — ACETAMINOPHEN 1000 MG: 500 TABLET, FILM COATED ORAL at 16:13

## 2023-02-14 RX ADMIN — SODIUM CITRATE AND CITRIC ACID MONOHYDRATE 30 ML: 500; 334 SOLUTION ORAL at 09:14

## 2023-02-14 RX ADMIN — SODIUM CHLORIDE, POTASSIUM CHLORIDE, SODIUM LACTATE AND CALCIUM CHLORIDE: 600; 310; 30; 20 INJECTION, SOLUTION INTRAVENOUS at 13:42

## 2023-02-14 RX ADMIN — SCOPOLAMINE 1 PATCH: 1.5 PATCH, EXTENDED RELEASE TRANSDERMAL at 15:17

## 2023-02-14 RX ADMIN — FAMOTIDINE 20 MG: 10 INJECTION, SOLUTION INTRAVENOUS at 09:15

## 2023-02-14 RX ADMIN — ACETAMINOPHEN 1000 MG: 500 TABLET, FILM COATED ORAL at 21:35

## 2023-02-14 RX ADMIN — KETOROLAC TROMETHAMINE 30 MG: 30 INJECTION, SOLUTION INTRAMUSCULAR; INTRAVENOUS at 11:30

## 2023-02-14 RX ADMIN — Medication 200 MCG: at 10:24

## 2023-02-14 RX ADMIN — CEFAZOLIN 2 G: 330 INJECTION, POWDER, FOR SOLUTION INTRAMUSCULAR; INTRAVENOUS at 09:41

## 2023-02-14 RX ADMIN — ONDANSETRON 4 MG: 2 INJECTION INTRAMUSCULAR; INTRAVENOUS at 13:50

## 2023-02-14 RX ADMIN — Medication 100 MCG: at 10:00

## 2023-02-14 RX ADMIN — METOCLOPRAMIDE 10 MG: 5 INJECTION, SOLUTION INTRAMUSCULAR; INTRAVENOUS at 09:14

## 2023-02-14 RX ADMIN — Medication 200 MCG: at 09:53

## 2023-02-14 RX ADMIN — SODIUM CHLORIDE, SODIUM GLUCONATE, SODIUM ACETATE, POTASSIUM CHLORIDE AND MAGNESIUM CHLORIDE 1500 ML: 526; 502; 368; 37; 30 INJECTION, SOLUTION INTRAVENOUS at 09:15

## 2023-02-14 ASSESSMENT — PAIN DESCRIPTION - PAIN TYPE: TYPE: ACUTE PAIN;SURGICAL PAIN

## 2023-02-14 ASSESSMENT — COPD QUESTIONNAIRES
COPD SCREENING SCORE: 0
HAVE YOU SMOKED AT LEAST 100 CIGARETTES IN YOUR ENTIRE LIFE: NO/DON'T KNOW
DO YOU EVER COUGH UP ANY MUCUS OR PHLEGM?: NO/ONLY WITH OCCASIONAL COLDS OR INFECTIONS
DURING THE PAST 4 WEEKS HOW MUCH DID YOU FEEL SHORT OF BREATH: NONE/LITTLE OF THE TIME
IN THE PAST 12 MONTHS DO YOU DO LESS THAN YOU USED TO BECAUSE OF YOUR BREATHING PROBLEMS: DISAGREE/UNSURE

## 2023-02-14 ASSESSMENT — LIFESTYLE VARIABLES
EVER HAD A DRINK FIRST THING IN THE MORNING TO STEADY YOUR NERVES TO GET RID OF A HANGOVER: NO
TOTAL SCORE: 0
CONSUMPTION TOTAL: INCOMPLETE
TOTAL SCORE: 0
HAVE PEOPLE ANNOYED YOU BY CRITICIZING YOUR DRINKING: NO
ALCOHOL_USE: NO
EVER_SMOKED: NEVER
HAVE YOU EVER FELT YOU SHOULD CUT DOWN ON YOUR DRINKING: NO
TOTAL SCORE: 0
EVER FELT BAD OR GUILTY ABOUT YOUR DRINKING: NO

## 2023-02-14 ASSESSMENT — PAIN SCALES - GENERAL
PAINLEVEL: 0 - NO PAIN
PAIN_LEVEL: 0

## 2023-02-14 NOTE — ANESTHESIA TIME REPORT
Anesthesia Start and Stop Event Times     Date Time Event    2/14/2023 0835 Ready for Procedure     0934 Anesthesia Start     1055 Anesthesia Stop        Responsible Staff  02/14/23    Name Role Begin End    Boogie Chavez D.O. Anesth 0934 1055        Overtime Reason:  no overtime (within assigned shift)    Comments:

## 2023-02-14 NOTE — PROGRESS NOTES
0735 32 year old, , 39.4, EDC 23 presents to unit for scheduled repeat  section with bilateral salpingectomy   Pt reports irregular Ucs, +FM, no VB or LOF  No GDM or gHTN    0925 pre-op completed    0934 in OR    1009 C/S delivery of viable male infant, 8/8 apgars, nuchal x1    1011 C/S delivery of intact placenta, fundus firm, 1 below umbilicus, bleeding scant    1054 out of OR    1055 in PACU    1215 out PACU    1230 pt and family transferred via gurney to PP room s331  Report given to Marleni ZEPEDA

## 2023-02-14 NOTE — ANESTHESIA PROCEDURE NOTES
Spinal Block    Date/Time: 2/14/2023 9:38 AM  Performed by: Boogie Chavez D.O.  Authorized by: Boogie Chavez D.O.     Patient Location:  OB  Start Time:  2/14/2023 9:38 AM  End Time:  2/14/2023 9:40 AM  Reason for Block: primary anesthetic    patient identified, IV checked, site marked, risks and benefits discussed, surgical consent, monitors and equipment checked, pre-op evaluation and timeout performed    Patient Position:  Sitting  Prep: ChloraPrep, patient draped and sterile technique    Monitoring:  Blood pressure, continuous pulse oximetry and heart rate  Approach:  Midline  Location:  L3-4  Injection Technique:  Single-shot  Skin infiltration:  Lidocaine  Strength:  1%  Dose:  3ml  Needle Type:  Pencan  Needle Gauge:  25 G  CSF flowing pre/post injection:  Yes  Sensory Level:  T4

## 2023-02-14 NOTE — PROGRESS NOTES
Bedside report received from Clarisa ZEPEDA @ 1230. Verified baby's wrist band with the patient and FOB. Cuddle is active. Oriented patient to the room, introduced the call light. . Discussed plan of care. Assessment done. Encouraged to call if with need. Will check at intervals.     @1335: Showed patient how to use the incentive spirometer. Abdominal binder applied.     @ 1800:Got patient up to the bathroom. She ambulated well. Perineal care done.     @ 1840: Informed Dr Mendez about patient's urine output. She advised to give bolus of 500 of LR and Toradol may be given.

## 2023-02-14 NOTE — H&P
Obstetrics Admission History and Physical      History of Present Illness  Patient is a 32 y.o.  at 39w0d who presents for scheduled repeat  with bilateral salpingectomy.  She states the baby is moving well.  Pregnancy has been uncomplicated.  She had an abnormal GCT with only 1 abnml value on GTT. She denies complications with previous pregnancies or previous c-sections.  Last US was 25wk anatomy US which was normal, placenta is posterior.    Pregnancy dated by: L=25wks    Pregnancy complications/antepartum admissions:   Patient Active Problem List   Diagnosis    Previous  delivery x3    Supervision of high-risk pregnancy    Glucose intolerance (impaired glucose tolerance)    Positive GBS test    Request for sterilization    Labor and delivery indication for care or intervention          OB History    Para Term  AB Living   6 4 4   1 4   SAB IAB Ectopic Molar Multiple Live Births   1       0 4      # Outcome Date GA Lbr Nacho/2nd Weight Sex Delivery Anes PTL Lv   6 Current            5 Term 20 39w5d  3.755 kg (8 lb 4.5 oz) F CS-LTranv Spinal N ISAIAH   4 Term 19 39w3d  3.91 kg (8 lb 9.9 oz) F CS-LTranv Spinal N ISAIAH   3 Term 12/05/15 39w5d  4.111 kg (9 lb 1 oz) M CS-Unspec Spinal N ISAIAH      Birth Comments: Pt states Does not know the reason for the C/Section   2 2013 10w1d    SAB         Birth Comments: Molar pregnancy with D&C      Complications: Molar pregnancy   1 Term 07 39w0d  3.572 kg (7 lb 14 oz) F Vag-Vacuum EPI N ISAIAH      Birth Comments: Pt. states no complications.        Past Medical History:   Diagnosis Date    Acute biliary pancreatitis without infection or necrosis 2020    Hypokalemia 2020    Pregnant        No current facility-administered medications on file prior to encounter.     Current Outpatient Medications on File Prior to Encounter   Medication Sig Dispense Refill    Prenatal MV-Min-Fe Fum-FA-DHA (PRENATAL 1 PO) Take  by mouth  every day.           No Known Allergies    Family History   Problem Relation Age of Onset    No Known Problems Mother     Stroke Father     No Known Problems Sister     No Known Problems Brother     No Known Problems Brother        Past Surgical History:   Procedure Laterality Date    REPEAT C SECTION  2020    Procedure:  SECTION, REPEAT;  Surgeon: Marylou Ruano D.O.;  Location: LABOR AND DELIVERY;  Service: Obstetrics    REPEAT C SECTION Bilateral 2019    Procedure:  SECTION, REPEAT;  Surgeon: Eulalio Frazier M.D.;  Location: LABOR AND DELIVERY;  Service: Labor and Delivery    PRIMARY C SECTION  2015    Procedure: PRIMARY C SECTION;  Surgeon: Lindsey Stein M.D.;  Location: LABOR AND DELIVERY;  Service:     DILATION AND CURETTAGE  2013    Performed by Kj Castrejon M.D. at [facility]    FOOT SURGERY      right foot surgery age 7       Social History  Social History     Tobacco Use    Smoking status: Never    Smokeless tobacco: Never   Vaping Use    Vaping Use: Never used   Substance Use Topics    Alcohol use: Not Currently    Drug use: Never       Review of Systems   General: Fever: Negative  HEENT: Sore Throat: Negative  CV: Chest Pain: Negative  Repiratory: Shortness of Breath: Negative  GI: Abdominal pain: Negative  : Dysuria: Negative    Physical Examination  Vitals:    23 0755   BP: 114/72   Pulse: 93   Resp: 18   Temp: 36.3 °C (97.3 °F)   SpO2: 95%     Appearance/Psychiatric: She does not appear anxious.  Constitutional: The patient is well nourished.  Neck: Neck appears symmetric.  Cardiovascular: No peripheral edema.  Respiratory: Respirations unlabored  Gastrointestinal: Soft, non-tender, gravid.  Extremeties: Legs are symmetric and without tenderness.   Skin: No rash observed.    NST: 120, reactive    Labs:  Recent Labs     22  0935 23  0822   ABOGROUP O O   RUBELLAIGG 65.50  --    HEPBSAG Non-Reactive  --    HEPCAB  Non-Reactive  --    GCBYDNAPR Negative  --      O+  GBS +      Assessment/Plan:   32 y.o.  at 39w0d   Patient Active Problem List    Diagnosis Date Noted    Labor and delivery indication for care or intervention 2023    Request for sterilization 2023    Positive GBS test 2023    Glucose intolerance (impaired glucose tolerance) 2022    Supervision of high-risk pregnancy 2022    Previous  delivery x3 10/12/2022       Repeat  with permanent sterilization   - The risks, benefits and alternatives of  were discussed.  The risks include DVT/pulmonary embolism, pelvic scarring, pelvic pain, infection, bleeding, scarring, injury to bowel, bladder, ureters or blood vessels, anesthesia complications, injury to fetus and death. Future consideration for repeat  section vs trial of labor after  were also mentioned.  I also discussed with the patient the risk of wound infection and wound breakdown. We discussed that these risks are greater in people that have diabetes or obesity. I also discussed the risk of emergency blood transfusion during procedure as well as emergency hysterectomy during procedure. Patient had the opportunity to ask questions regarding procedures. All questions answered to the patient's satisfaction. Pt agrees to proceed with surgery.   - reviewed additional procedure of salpingectomy, risks of bleeding, permanence of procedure      Amy Mendez D.O.

## 2023-02-14 NOTE — ANESTHESIA POSTPROCEDURE EVALUATION
Patient: Sue Fraser    Procedure Summary     Date: 23 Room / Location: [facility] OR 02 / SURGERY LABOR [facility] DELIVERY    Anesthesia Start: 934 Anesthesia Stop:     Procedure: REPEAT  SECTION, BILATERAL SALPINGECTOMY (Abdomen) Diagnosis: ( section delivered)    Surgeons: Amy Mendez D.O. Responsible Provider: Booige Chavez D.O.    Anesthesia Type: spinal ASA Status: 2          Final Anesthesia Type: spinal  Last vitals  BP   Blood Pressure: 114/72    Temp   36.3 °C (97.3 °F)    Pulse   93   Resp   18    SpO2   95 %      Anesthesia Post Evaluation    Patient location during evaluation: PACU  Patient participation: complete - patient participated  Level of consciousness: awake and alert  Pain score: 0    Airway patency: patent  Anesthetic complications: no  Cardiovascular status: hemodynamically stable  Respiratory status: acceptable  Hydration status: euvolemic    PONV: none          No notable events documented.     Nurse Pain Score: 4 (NPRS)

## 2023-02-14 NOTE — OP REPORT
Pre-operative Diagnosis:   1. IUP at 39w0d  2. Prior  x3  3. Desires permanent sterilization  Post-operative Diagnosis: same  Procedure:   1. Repeat  Section via Pfannenstiel incision  2. Bilateral salpingectomy  Surgeon(s): Andrea  Assistant(s): HOME Cuevas  Anesthesia: Spinal  Findings: LV male infant, Apgars 8/8, weight 3710g; normal uterus, tubes and ovaries bilaterally.   Complications: none  Specimens: fallopian tubes  UOP: 75 mL, concentrated  EBL: 400 mL    Indications:   Pt is a 32 y.o.  at 39w0d here for scheduled repeat  and bilateral salpingectomy. The risks, benefits and alternatives of  section were discussed with the patient, including but not limited to infection, severe loss of blood, injury to bowel or bladder, fistula formation, injury to blood vessels, hysterectomy, injury to fetus, possible need for transfusion which carries a small risk for HIV or hepatitis, pelvic pain, adhesive disease or scar tissue. The patient was again counseled on permanent sterilization, noting that the procedure is permanent and there is a very small risk of failure.  All questions were answered and patient consented to the procedure.      Procedure:  The patient was taken to the operating room where spinal anesthesia was placed and found to be adequate. She was prepped and draped in the normal sterile fashion in the dorsal supine position with a leftward tilt.    A Pfanenstiel skin incision was made with the scalpel and carried through to the underlying layer of fascia.  The fascia was then incised in the midline and the incision was extended laterally. The rectus muscles were  and the peritoneum entered. The peritoneal incision was then extended superiorly and inferiorly with good visualization of the bladder.    The Chuck retractor was inserted and the vesicouterine peritoneum identified, grasped with pick ups and entered sharply with Metzenbaum scissors.   The incision was extended laterally and the bladder flap created digitally.  The lower uterine segment incised in a low transverse fashion with the scalpel. The uterine incision was extended bluntly with cephalad-caudad traction.    The infant was delivered atraumatically; the nose and mouth were suctioned and the cord was clamped and cut. The infant was handed off to the waiting staff. The placenta was removed, and the uterus was cleared of all clots and debris.  The uterine incision was repaired with 0-monocryl in a single layer.  A double figure of eight suture was used on the right corner for additional hemostasis    The gutters were cleared of all clots and debris using copious irrigation. The uterus was then re-inspected to ensure hemostasis as were all subfascial tissues.  Attention was then turned to the fallopian tubes. The tube was grasped and elevated with two [place] clamps.  The mini Ligasure was then used across the tubo-ovarian ligament and along the mesosalpinx to the tubal cornua where where the tube was ligated and removed.  The procedure was repeated on the contralateral side and the two  fallopian tubes were passed off and sent to Pathology.  The site was inspected for hemostasis bilaterally.    The rectus muscle was re-approximated with 0-monocryl in a mattress fashion.  The fascia was re-approximated with 0-vicryl in a running fashion. The subcutaneous tissue was re-approximated using 3-0 vicryl in a running fashion. The skin was closed with 4-0 vicryl.    The patient tolerated the procedure well. Sponge, lap and needle counts were correct times three. The patient was taken to recovery in stable condition.    Amy Mendez D.O.

## 2023-02-14 NOTE — ANESTHESIA PREPROCEDURE EVALUATION
Case: [id number] Date/Time: 23    Procedure: REPEAT  SECTION, BILATERAL SALPINGECTOMY (Abdomen)    Pre-op diagnosis: PREVIOUS  SECTION, PERMANENT STERILIZATION- 39 WEEKS    Location: [facility] OR 01 / SURGERY LABOR AND DELIVERY    Surgeons: Amy Mendez D.O.          Relevant Problems   No relevant active problems       Physical Exam    Airway   Mallampati: II  TM distance: >3 FB  Neck ROM: full       Cardiovascular - normal exam  Rhythm: regular  Rate: normal  (-) murmur     Dental - normal exam           Pulmonary - normal exam  Breath sounds clear to auscultation     Abdominal    Neurological - normal exam                 Anesthesia Plan    ASA 2       Plan - spinal   Neuraxial block will be primary anesthetic                Postoperative Plan: Postoperative administration of opioids is intended.    Pertinent diagnostic labs and testing reviewed    Informed Consent:    Anesthetic plan and risks discussed with patient.

## 2023-02-15 LAB
ERYTHROCYTE [DISTWIDTH] IN BLOOD BY AUTOMATED COUNT: 50.4 FL (ref 35.9–50)
FLUAV RNA SPEC QL NAA+PROBE: NEGATIVE
FLUBV RNA SPEC QL NAA+PROBE: NEGATIVE
HCT VFR BLD AUTO: 33.2 % (ref 37–47)
HGB BLD-MCNC: 10.6 G/DL (ref 12–16)
MCH RBC QN AUTO: 26 PG (ref 27–33)
MCHC RBC AUTO-ENTMCNC: 31.9 G/DL (ref 33.6–35)
MCV RBC AUTO: 81.4 FL (ref 81.4–97.8)
PLATELET # BLD AUTO: 260 K/UL (ref 164–446)
PMV BLD AUTO: 11.5 FL (ref 9–12.9)
RBC # BLD AUTO: 4.08 M/UL (ref 4.2–5.4)
RSV RNA SPEC QL NAA+PROBE: NEGATIVE
SARS-COV-2 RNA RESP QL NAA+PROBE: NOTDETECTED
SPECIMEN SOURCE: NORMAL
WBC # BLD AUTO: 8.4 K/UL (ref 4.8–10.8)

## 2023-02-15 PROCEDURE — 85027 COMPLETE CBC AUTOMATED: CPT

## 2023-02-15 PROCEDURE — A9270 NON-COVERED ITEM OR SERVICE: HCPCS

## 2023-02-15 PROCEDURE — 700102 HCHG RX REV CODE 250 W/ 637 OVERRIDE(OP)

## 2023-02-15 PROCEDURE — 700111 HCHG RX REV CODE 636 W/ 250 OVERRIDE (IP): Performed by: STUDENT IN AN ORGANIZED HEALTH CARE EDUCATION/TRAINING PROGRAM

## 2023-02-15 PROCEDURE — 700102 HCHG RX REV CODE 250 W/ 637 OVERRIDE(OP): Performed by: STUDENT IN AN ORGANIZED HEALTH CARE EDUCATION/TRAINING PROGRAM

## 2023-02-15 PROCEDURE — C9803 HOPD COVID-19 SPEC COLLECT: HCPCS

## 2023-02-15 PROCEDURE — 0241U HCHG SARS-COV-2 COVID-19 NFCT DS RESP RNA 4 TRGT MIC: CPT

## 2023-02-15 PROCEDURE — A9270 NON-COVERED ITEM OR SERVICE: HCPCS | Performed by: STUDENT IN AN ORGANIZED HEALTH CARE EDUCATION/TRAINING PROGRAM

## 2023-02-15 PROCEDURE — 770002 HCHG ROOM/CARE - OB PRIVATE (112)

## 2023-02-15 PROCEDURE — 36415 COLL VENOUS BLD VENIPUNCTURE: CPT

## 2023-02-15 RX ORDER — GUAIFENESIN 200 MG/10ML
10 LIQUID ORAL EVERY 4 HOURS PRN
Status: DISCONTINUED | OUTPATIENT
Start: 2023-02-15 | End: 2023-02-17 | Stop reason: HOSPADM

## 2023-02-15 RX ADMIN — ACETAMINOPHEN 1000 MG: 500 TABLET, FILM COATED ORAL at 16:21

## 2023-02-15 RX ADMIN — DOCUSATE SODIUM 100 MG: 100 CAPSULE, LIQUID FILLED ORAL at 22:36

## 2023-02-15 RX ADMIN — GUAIFENESIN 200 MG: 100 SOLUTION ORAL at 13:57

## 2023-02-15 RX ADMIN — ACETAMINOPHEN 1000 MG: 500 TABLET, FILM COATED ORAL at 03:40

## 2023-02-15 RX ADMIN — ACETAMINOPHEN 1000 MG: 500 TABLET, FILM COATED ORAL at 22:36

## 2023-02-15 RX ADMIN — ACETAMINOPHEN 1000 MG: 500 TABLET, FILM COATED ORAL at 10:11

## 2023-02-15 RX ADMIN — DOCUSATE SODIUM 100 MG: 100 CAPSULE, LIQUID FILLED ORAL at 10:11

## 2023-02-15 RX ADMIN — GUAIFENESIN 200 MG: 100 SOLUTION ORAL at 22:36

## 2023-02-15 RX ADMIN — GUAIFENESIN 200 MG: 100 SOLUTION ORAL at 18:24

## 2023-02-15 RX ADMIN — IBUPROFEN 800 MG: 800 TABLET, FILM COATED ORAL at 17:41

## 2023-02-15 RX ADMIN — KETOROLAC TROMETHAMINE 30 MG: 30 INJECTION, SOLUTION INTRAMUSCULAR; INTRAVENOUS at 06:04

## 2023-02-15 ASSESSMENT — PAIN DESCRIPTION - PAIN TYPE
TYPE: ACUTE PAIN
TYPE: ACUTE PAIN;SURGICAL PAIN
TYPE: ACUTE PAIN
TYPE: ACUTE PAIN
TYPE: ACUTE PAIN;SURGICAL PAIN
TYPE: ACUTE PAIN;SURGICAL PAIN

## 2023-02-15 ASSESSMENT — EDINBURGH POSTNATAL DEPRESSION SCALE (EPDS)
I HAVE BEEN SO UNHAPPY THAT I HAVE BEEN CRYING: NO, NEVER
I HAVE FELT SAD OR MISERABLE: NO, NOT AT ALL
I HAVE LOOKED FORWARD WITH ENJOYMENT TO THINGS: AS MUCH AS I EVER DID
I HAVE BLAMED MYSELF UNNECESSARILY WHEN THINGS WENT WRONG: NO, NEVER
I HAVE FELT SCARED OR PANICKY FOR NO GOOD REASON: NO, NOT AT ALL
I HAVE BEEN ABLE TO LAUGH AND SEE THE FUNNY SIDE OF THINGS: AS MUCH AS I ALWAYS COULD
THINGS HAVE BEEN GETTING ON TOP OF ME: NO, I HAVE BEEN COPING AS WELL AS EVER
I HAVE BEEN SO UNHAPPY THAT I HAVE HAD DIFFICULTY SLEEPING: NOT AT ALL
I HAVE BEEN ANXIOUS OR WORRIED FOR NO GOOD REASON: NO, NOT AT ALL
THE THOUGHT OF HARMING MYSELF HAS OCCURRED TO ME: NEVER

## 2023-02-15 NOTE — CARE PLAN
Problem: Knowledge Deficit - Postpartum  Goal: Patient will verbalize and demonstrate understanding of self and infant care  Outcome: Progressing     Problem: Psychosocial - Postpartum  Goal: Patient will verbalize and demonstrate effective bonding and parenting behavior  Outcome: Progressing     Problem: Altered Physiologic Condition  Goal: Patient physiologically stable as evidenced by normal lochia, palpable uterine involution and vitals within normal limits  Outcome: Progressing     Problem: Infection - Postpartum  Goal: Postpartum patient will be free of signs and symptoms of infection  Outcome: Progressing     Problem: Respiratory/Oxygenation Function Post-Surgical  Goal: Patient will achieve/maintain normal respiratory rate/effort  Outcome: Progressing     Problem: Bowel Elimination - Post Surgical  Goal: Patient will resume regular bowel sounds and function with no discomfort or distention  Outcome: Progressing     Problem: Early Mobilization - Post Surgery  Goal: Early mobilization post surgery  Outcome: Progressing     Problem: Pain - Standard  Goal: Alleviation of pain or a reduction in pain to the patient’s comfort goal  Outcome: Progressing   The patient is Watcher - Medium risk of patient condition declining or worsening  D/t cough  Shift Goals  Clinical Goals: vital signs stable, ambulating, deep breathing  Patient Goals: pain control    Progress made toward(s) clinical / shift goals:  vitals stable this shift, patient is able to walk to bathroom independently, good pain control with ordered meds    Patient is not progressing towards the following goals:

## 2023-02-15 NOTE — PROGRESS NOTES
Patient is a CB of 2/14/23 1009 doing well tonight. Vitals stable, voiding without difficulty, pain control is good, support person at bedside and she is doing all infant cares independently.She has had a cough for four days, and she is working on her coughing and deep breathing and she is wearing a mask when holding infant. Since she began ambulating, her lung sounds are improving.

## 2023-02-15 NOTE — CONSULTS
Initial lactation visit:    ROBER delivered her fifth baby yesterday, 02/14/23, at 1009 at 39 weeks gestation.  ROBER reported she has chosen to feed infant both breast milk and formula just as she did with her previous four babies.  ROBER stated she is currently breastfeeding without concern and declined this 's offer for lactation assistance.    ROBER stated she is interested in re-applying for the WIC program.  WIC liaison, Anisha Edouard, in the hospital today and will inform her of ROBER's desire to re-enroll in the program.    MOB provided with the [facility] breastfeeding resource list.    MOB was encouraged to call for lactation support as needed.

## 2023-02-15 NOTE — PROGRESS NOTES
@ 0700: Report received from Francine ZEPEDA. Assumed care. Informed Dr Barahona about the concern of night RN Re: her cough.     @ 0745: Patient is awake and alert. Discussed plan of care. Her pain is well controlled at this time. Assessment done. She sounded clear but encouraged to regularly use her incentive spirometer and to ambulate in the hallway. She will be medicated as ordered. Patient is aware that she can receive Roxicodone if needed and have Robitussin available for her. Will check patient at frequent intervals.     @ 1200: Swabbed patient for Covid and came back negative.

## 2023-02-15 NOTE — PROGRESS NOTES
S: Pt is POD# 1s/p repeat CS with BTL. Pt is doing well. Pt is currently tolerating clears. Pt is passing gas, juarez has been removed and pt is voiding. Pt has been ambulating. Pain is well controlled. Nml lochia.     Yesterday, pt was vomiting and had low urine output.  She received at 500ml bolus of LR.  She is doing better this AM with VSS.      She notes she has had a cough for the past 4 days, she does not feel ill otherwise.      O:   Vitals:    02/15/23 0542   BP: 120/70   Pulse: 73   Resp: 17   Temp: 36.8 °C (98.2 °F)   SpO2: 95%       GEN: NAD  Abd: soft, approriately tender, fundus firm below U  Inicison: c/d/I  Ext: soft, NT, nml edema    Labs:   Recent Labs     23  0822   WBC 7.4   RBC 4.36   HEMOGLOBIN 11.5*   HEMATOCRIT 35.3*   MCV 81.0*   MCH 26.4*   MCHC 32.6*   RDW 50.4*   PLATELETCT 260   MPV 12.1       A/P: 32 y.o.  POD# 1 doing well.  -Robitussin cough syrup prescribed   -Viral resp panel ordered to r/o RSV, Covid, flu  -Continue pain management  -Encourage ambulation and IS use  -Advance diet  -Continue routine PP care/support  -Anticipate D/C to home POD#3

## 2023-02-16 ENCOUNTER — TELEPHONE (OUTPATIENT)
Dept: OBGYN | Facility: CLINIC | Age: 33
End: 2023-02-16

## 2023-02-16 PROCEDURE — A9270 NON-COVERED ITEM OR SERVICE: HCPCS

## 2023-02-16 PROCEDURE — 770002 HCHG ROOM/CARE - OB PRIVATE (112)

## 2023-02-16 PROCEDURE — 700102 HCHG RX REV CODE 250 W/ 637 OVERRIDE(OP)

## 2023-02-16 RX ADMIN — IBUPROFEN 800 MG: 800 TABLET, FILM COATED ORAL at 01:59

## 2023-02-16 RX ADMIN — IBUPROFEN 800 MG: 800 TABLET, FILM COATED ORAL at 14:33

## 2023-02-16 RX ADMIN — ACETAMINOPHEN 1000 MG: 500 TABLET, FILM COATED ORAL at 18:10

## 2023-02-16 RX ADMIN — ACETAMINOPHEN 1000 MG: 500 TABLET, FILM COATED ORAL at 04:33

## 2023-02-16 RX ADMIN — ACETAMINOPHEN 1000 MG: 500 TABLET, FILM COATED ORAL at 12:29

## 2023-02-16 RX ADMIN — IBUPROFEN 800 MG: 800 TABLET, FILM COATED ORAL at 22:33

## 2023-02-16 RX ADMIN — DOCUSATE SODIUM 100 MG: 100 CAPSULE, LIQUID FILLED ORAL at 22:33

## 2023-02-16 ASSESSMENT — PAIN DESCRIPTION - PAIN TYPE
TYPE: ACUTE PAIN
TYPE: ACUTE PAIN;SURGICAL PAIN
TYPE: ACUTE PAIN
TYPE: ACUTE PAIN;SURGICAL PAIN
TYPE: ACUTE PAIN;SURGICAL PAIN
TYPE: ACUTE PAIN
TYPE: SURGICAL PAIN
TYPE: ACUTE PAIN

## 2023-02-16 NOTE — PROGRESS NOTES
Bedside report received from DUANE Yepez. Pt in bed resting comfortably at this time. Pt able to get up to restroom and void independently, denies need for pain medication at this time. Pt states that she will call if additional pain medication is needed. Whiteboard updated. POC discussed. Call light within reach. All questions and concerns answered at this time, advised to call for assistance as needed.

## 2023-02-16 NOTE — CARE PLAN
The patient is Stable - Low risk of patient condition declining or worsening    Shift Goals  Clinical Goals: pain management;  Patient Goals: ambulate in the hallway    Progress made toward(s) clinical / shift goals:      Problem: Early Mobilization - Post Surgery  Goal: Early mobilization post surgery  Outcome: Progressing  Note: She was able to walked in the hallway. Patient is ambulating more inside her room and very independent of her care and baby.     Problem: Pain - Standard  Goal: Alleviation of pain or a reduction in pain to the patient’s comfort goal  Outcome: Progressing  Note: Pain is well controlled.       Patient is not progressing towards the following goals:

## 2023-02-16 NOTE — CARE PLAN
The patient is Stable - Low risk of patient condition declining or worsening    Shift Goals  Clinical Goals: VSS, light lochia, fundus firm, pain control  Patient Goals: ambulate in the hallway    Progress made toward(s) clinical / shift goals:  vital sign stable, light lochia, voiding independently, pain is manageable    Patient is not progressing towards the following goals:

## 2023-02-16 NOTE — CARE PLAN
The patient is Stable - Low risk of patient condition declining or worsening    Shift Goals  Clinical Goals: light lochia vss  Patient Goals: ambulate in the hallway    Progress made toward(s) clinical / shift goals:  vss ambulating and taking care of self and baby independently. He poole denies pain this am    Patient is not progressing towards the following goals:

## 2023-02-16 NOTE — PROGRESS NOTES
Post Partum Progress Note    Name:   Sue Fraser   Date/Time:  2023 - 7:05 AM  Chief Admitting Dx:  Labor and delivery, indication for care [O75.9]  Labor and delivery indication for care or intervention [O75.9]  Delivery Type:   for repeat  Post-Op/Post Partum Days #:  2    Subjective:  Abdominal pain: no  Ambulating:   yes  Tolerating liquids:  yes  Tolerating food:  yes regular  Flatus:   yes  BM:    yes  Bleeding:   Scant PP lochia  Voiding:   yes  Dizziness:   no    Vitals:    02/15/23 0542 02/15/23 0950 02/15/23 1720 23 0547   BP: 120/70 104/60 118/81 104/50   Pulse: 73 69 64 60   Resp: 17 16 16 16   Temp: 36.8 °C (98.2 °F) 36.6 °C (97.9 °F) 36.6 °C (97.8 °F) 36.3 °C (97.3 °F)   TempSrc: Temporal Temporal Temporal Temporal   SpO2: 95% 96% 94% 96%   Weight:       Height:           Exam:  Breast:  declined  Abdomen: Abdomen soft, non-tender. BS normal. No masses,  No organomegaly  Fundal Tenderness:  no  Fundus Firm: yes  Incision: dry and intact  Below umbilicus: yes  Perineum: perineum intact  Lochia: mild  Extremities: Normal extremities, peripheral pulses and reflexes normal    Meds:  Current Facility-Administered Medications   Medication Dose    guaiFENesin (Robitussin) 100 MG/5ML liquid 200 mg  10 mL    oxytocin (Pitocin) infusion (for postpartum)  125 mL/hr    oxytocin (PITOCIN) injection 10 Units  10 Units    lactated ringers infusion      ibuprofen (MOTRIN) tablet 800 mg  800 mg    Followed by    [START ON 2023] ibuprofen (MOTRIN) tablet 800 mg  800 mg    acetaminophen (TYLENOL) tablet 1,000 mg  1,000 mg    Followed by    [START ON 2023] acetaminophen (TYLENOL) tablet 1,000 mg  1,000 mg    oxyCODONE immediate-release (ROXICODONE) tablet 5 mg  5 mg    oxyCODONE immediate release (ROXICODONE) tablet 10 mg  10 mg    ondansetron (ZOFRAN) syringe/vial injection 4 mg  4 mg    Or    ondansetron (ZOFRAN ODT) dispertab 4 mg  4 mg    diphenhydrAMINE (BENADRYL)  tablet/capsule 25 mg  25 mg    Or    diphenhydrAMINE (BENADRYL) injection 25 mg  25 mg    docusate sodium (COLACE) capsule 100 mg  100 mg    scopolamine (TRANSDERM-SCOP) patch 1 Patch  1 Patch       Labs:   Recent Labs     23  0822 02/15/23  0731   WBC 7.4 8.4   RBC 4.36 4.08*   HEMOGLOBIN 11.5* 10.6*   HEMATOCRIT 35.3* 33.2*   MCV 81.0* 81.4   MCH 26.4* 26.0*   MCHC 32.6* 31.9*   RDW 50.4* 50.4*   PLATELETCT 260 260   MPV 12.1 11.5       Assessment:  Chief Admitting Dx:  Labor and delivery, indication for care [O75.9]  Labor and delivery indication for care or intervention [O75.9]  Delivery Type:   for repeat  Tubal Ligation:  Yes  Negative respiratory panel - pt states improvement of cough    Plan:  Continue routine post partum care.  Encourage frequent movement/position changes. Encourage frequent voiding and eating/drinking.  Anticipate discharge on POD 3, if stable    Arielle Schwartz C.N.M.

## 2023-02-17 ENCOUNTER — PHARMACY VISIT (OUTPATIENT)
Dept: PHARMACY | Facility: MEDICAL CENTER | Age: 33
End: 2023-02-17
Payer: COMMERCIAL

## 2023-02-17 VITALS
OXYGEN SATURATION: 98 % | SYSTOLIC BLOOD PRESSURE: 119 MMHG | HEIGHT: 59 IN | DIASTOLIC BLOOD PRESSURE: 70 MMHG | HEART RATE: 63 BPM | TEMPERATURE: 97.6 F | WEIGHT: 163 LBS | BODY MASS INDEX: 32.86 KG/M2 | RESPIRATION RATE: 16 BRPM

## 2023-02-17 PROCEDURE — 700102 HCHG RX REV CODE 250 W/ 637 OVERRIDE(OP)

## 2023-02-17 PROCEDURE — A9270 NON-COVERED ITEM OR SERVICE: HCPCS

## 2023-02-17 PROCEDURE — RXMED WILLOW AMBULATORY MEDICATION CHARGE

## 2023-02-17 RX ORDER — IBUPROFEN 800 MG/1
800 TABLET ORAL EVERY 8 HOURS PRN
Qty: 30 TABLET | Refills: 0 | Status: SHIPPED | OUTPATIENT
Start: 2023-02-17

## 2023-02-17 RX ORDER — OXYCODONE HYDROCHLORIDE 5 MG/1
5 TABLET ORAL EVERY 4 HOURS PRN
Qty: 10 TABLET | Refills: 0 | Status: SHIPPED | OUTPATIENT
Start: 2023-02-17 | End: 2023-02-20

## 2023-02-17 RX ORDER — PSEUDOEPHEDRINE HCL 30 MG
100 TABLET ORAL 2 TIMES DAILY PRN
Qty: 60 CAPSULE | Refills: 0 | Status: SHIPPED | OUTPATIENT
Start: 2023-02-17

## 2023-02-17 RX ORDER — ACETAMINOPHEN 500 MG
1000 TABLET ORAL EVERY 6 HOURS
Qty: 30 TABLET | Refills: 0 | Status: SHIPPED | OUTPATIENT
Start: 2023-02-17

## 2023-02-17 RX ADMIN — DOCUSATE SODIUM 100 MG: 100 CAPSULE, LIQUID FILLED ORAL at 08:16

## 2023-02-17 RX ADMIN — ACETAMINOPHEN 1000 MG: 500 TABLET, FILM COATED ORAL at 01:24

## 2023-02-17 RX ADMIN — ACETAMINOPHEN 1000 MG: 500 TABLET, FILM COATED ORAL at 08:16

## 2023-02-17 RX ADMIN — IBUPROFEN 800 MG: 800 TABLET, FILM COATED ORAL at 06:09

## 2023-02-17 ASSESSMENT — PAIN DESCRIPTION - PAIN TYPE
TYPE: ACUTE PAIN;SURGICAL PAIN
TYPE: ACUTE PAIN;SURGICAL PAIN

## 2023-02-17 NOTE — CARE PLAN
The patient is Stable - Low risk of patient condition declining or worsening    Shift Goals  Clinical Goals: VSS, light lochia, pain control  Patient Goals: ambulate in the hallway    Progress made toward(s) clinical / shift goals:  Pt vital signs stable, pain is manageable, scant bleeding noted    Patient is not progressing towards the following goals:

## 2023-02-17 NOTE — DISCHARGE SUMMARY
Discharge Summary:     Date of Admission: 2023  Date of Discharge: 23      Admitting diagnosis:    1. Pregnancy @ 39w0d  2. Desire for permanent sterilization  3. History of       Discharge Diagnosis:   1. Status post  for repeat.  2. S/p BTL    Past Medical History:   Diagnosis Date    Acute biliary pancreatitis without infection or necrosis 2020    Hypokalemia 2020    Pregnant      OB History    Para Term  AB Living   6 5 5   1 5   SAB IAB Ectopic Molar Multiple Live Births   1       0 5      # Outcome Date GA Lbr Nacho/2nd Weight Sex Delivery Anes PTL Lv   6 Term 23 39w0d  3.71 kg (8 lb 2.9 oz) M CS-LTranv Spinal N ISAIAH   5 Term 20 39w5d  3.755 kg (8 lb 4.5 oz) F CS-LTranv Spinal N ISAIAH   4 Term 19 39w3d  3.91 kg (8 lb 9.9 oz) F CS-LTranv Spinal N ISAIAH   3 Term 12/05/15 39w5d  4.111 kg (9 lb 1 oz) M CS-Unspec Spinal N ISAIAH      Birth Comments: Pt states Does not know the reason for the C/Section   2 SAB  10w1d    SAB         Birth Comments: Molar pregnancy with D&C      Complications: Molar pregnancy   1 Term 07 39w0d  3.572 kg (7 lb 14 oz) F Vag-Vacuum EPI N ISAIAH      Birth Comments: Pt. states no complications.      Past Surgical History:   Procedure Laterality Date    REPEAT C SECTOIN WITH SALPINGECTOMY N/A 2023    Procedure: REPEAT  SECTION, BILATERAL SALPINGECTOMY;  Surgeon: Amy Mendez D.O.;  Location: SURGERY LABOR AND DELIVERY;  Service: Obstetrics    REPEAT C SECTION  2020    Procedure:  SECTION, REPEAT;  Surgeon: Marylou Ruano D.O.;  Location: LABOR AND DELIVERY;  Service: Obstetrics    REPEAT C SECTION Bilateral 2019    Procedure:  SECTION, REPEAT;  Surgeon: Eulalio Frazier M.D.;  Location: LABOR AND DELIVERY;  Service: Labor and Delivery    PRIMARY C SECTION  2015    Procedure: PRIMARY C SECTION;  Surgeon: Lindsey Stein M.D.;  Location: LABOR AND DELIVERY;   Service:     DILATION AND CURETTAGE  2013    Performed by Kj Castrejon M.D. at SURGERY [facility]    FOOT SURGERY      right foot surgery age 7     Patient has no known allergies.    Patient Active Problem List   Diagnosis    Previous  delivery x3    Supervision of high-risk pregnancy    Glucose intolerance (impaired glucose tolerance)    Positive GBS test    Request for sterilization    Labor and delivery indication for care or intervention       Hospital Course:   Pt is a 32 y.o. now  who presented on 2023 for scheduled repeat CS with BTL. Single male infant was delivered via CS.  ml.     Postpartum course notable for early ambulation, well managed pain, tolerance of diet, spontaneous voiding, and appropriate feeding of infant. She has remained afebrile and blood pressure has been well controlled. All maternal questions and concerns addressed.    Physical Exam:  Temp:  [36.3 °C (97.4 °F)-36.4 °C (97.6 °F)] 36.4 °C (97.6 °F)  Pulse:  [63] 63  Resp:  [16] 16  BP: (114-119)/(66-70) 119/70  SpO2:  [98 %] 98 %  Physical Exam  General: well appearing, no apparent distress  Abdomen: soft, nontender, nondistended  Fundus: firm at level below umbilicus  Incision: dressing clean, dry, intact  Perineum: Deferred  Extremities: symmetric, no peripheral edema, calves nontender    Current Facility-Administered Medications   Medication Dose    guaiFENesin (Robitussin) 100 MG/5ML liquid 200 mg  10 mL    oxytocin (Pitocin) infusion (for postpartum)  125 mL/hr    oxytocin (PITOCIN) injection 10 Units  10 Units    lactated ringers infusion      ibuprofen (MOTRIN) tablet 800 mg  800 mg    Followed by    [START ON 2023] ibuprofen (MOTRIN) tablet 800 mg  800 mg    acetaminophen (TYLENOL) tablet 1,000 mg  1,000 mg    Followed by    [START ON 2023] acetaminophen (TYLENOL) tablet 1,000 mg  1,000 mg    oxyCODONE immediate-release (ROXICODONE) tablet 5 mg  5 mg    oxyCODONE immediate  release (ROXICODONE) tablet 10 mg  10 mg    ondansetron (ZOFRAN) syringe/vial injection 4 mg  4 mg    Or    ondansetron (ZOFRAN ODT) dispertab 4 mg  4 mg    diphenhydrAMINE (BENADRYL) tablet/capsule 25 mg  25 mg    Or    diphenhydrAMINE (BENADRYL) injection 25 mg  25 mg    docusate sodium (COLACE) capsule 100 mg  100 mg    scopolamine (TRANSDERM-SCOP) patch 1 Patch  1 Patch       Recent Labs     23  0822 02/15/23  0731   WBC 7.4 8.4   RBC 4.36 4.08*   HEMOGLOBIN 11.5* 10.6*   HEMATOCRIT 35.3* 33.2*   MCV 81.0* 81.4   MCH 26.4* 26.0*   MCHC 32.6* 31.9*   RDW 50.4* 50.4*   PLATELETCT 260 260   MPV 12.1 11.5         Activity/ Discharge Instructions::   Discharge to home  Pelvic Rest x 6 weeks  No heavy lifting x4 weeks  Call or come to ED for: heavy vaginal bleeding, fever >100.4, severe abdominal pain, severe headache, chest pain, shortness of breath,  N/V, incisional drainage, or other concerns.       Follow up:  [facility] in 1 week for incision check for  delivery check-up.     Discharge Meds:   Current Outpatient Medications   Medication Sig Dispense Refill    acetaminophen (TYLENOL) 500 MG Tab Take 2 Tablets by mouth every 6 hours. 30 Tablet 0    docusate sodium 100 MG Cap Take 100 mg by mouth 2 times a day as needed for Constipation. 60 Capsule 0    ibuprofen (MOTRIN) 800 MG Tab Take 1 Tablet by mouth every 8 hours as needed for Moderate Pain. 30 Tablet 0    oxyCODONE immediate-release (ROXICODONE) 5 MG Tab Take 1 Tablet by mouth every four hours as needed for Severe Pain for up to 10 doses. 10 Tablet 0           Sharmin Barahona D.O.  PGY-1

## 2023-02-17 NOTE — CARE PLAN
The patient is Stable - Low risk of patient condition declining or worsening    Shift Goals  Clinical Goals: Patient will maintain stable VS; Lochia WDL; D/c Home today  Patient Goals: ambulate in the hallway    Progress made toward(s) clinical / shift goals:    Problem: Knowledge Deficit - L&D  Goal: Patient and family/caregivers will demonstrate understanding of plan of care, disease process/condition, diagnostic tests and medications  Outcome: Met     Problem: Risk for Excess Fluid Volume  Goal: Patient will demonstrate pulse, blood pressure and neurologic signs within expected ranges and without any respiratory complications  Outcome: Met     Problem: Knowledge Deficit - Postpartum  Goal: Patient will verbalize and demonstrate understanding of self and infant care  Outcome: Met     Problem: Psychosocial - Postpartum  Goal: Patient will verbalize and demonstrate effective bonding and parenting behavior  Outcome: Met     Problem: Altered Physiologic Condition  Goal: Patient physiologically stable as evidenced by normal lochia, palpable uterine involution and vitals within normal limits  Outcome: Met     Problem: Infection - Postpartum  Goal: Postpartum patient will be free of signs and symptoms of infection  Outcome: Met     Problem: Respiratory/Oxygenation Function Post-Surgical  Goal: Patient will achieve/maintain normal respiratory rate/effort  Outcome: Met     Problem: Bowel Elimination - Post Surgical  Goal: Patient will resume regular bowel sounds and function with no discomfort or distention  Outcome: Met     Problem: Early Mobilization - Post Surgery  Goal: Early mobilization post surgery  Outcome: Met     Problem: Pain - Standard  Goal: Alleviation of pain or a reduction in pain to the patient’s comfort goal  Outcome: Met     Problem: Knowledge Deficit - Standard  Goal: Patient and family/care givers will demonstrate understanding of plan of care, disease process/condition, diagnostic tests and  medications  Outcome: Met       Patient is not progressing towards the following goals:

## 2023-02-17 NOTE — PROGRESS NOTES
Bedside report received from DUANE Warner. Pt in bed resting comfortably at this time. Pt able to get up to restroom and void independently, denies need for pain medication at this time. Pt states that she will call if additional pain medication is needed. Whiteboard updated. POC discussed. Call light within reach. All questions and concerns answered at this time, advised to call for assistance as needed.

## 2023-02-17 NOTE — PROGRESS NOTES
0815 Assessment completed. Lochia light, fundus firm. Plan of care reviewed. Declines pain intervention at this time, will call if pain intervention needed.    1115 Discharge instructions and education reviewed with patient/parents, verbalized understanding, papers signed.   1320 Left facility escorted by staff.

## 2023-02-17 NOTE — DISCHARGE INSTRUCTIONS

## 2023-02-18 NOTE — DISCHARGE PLANNING
Meds-to-Beds: Discharge prescription orders listed below delivered to patient's bedside. DUANE Love notified. Patient counseled.      Current Outpatient Medications   Medication Sig Dispense Refill    acetaminophen (TYLENOL) 500 MG Tab Take 2 Tablets by mouth every 6 hours. 30 Tablet 0    docusate sodium 100 MG Cap Take 1 capsule by mouth 2 times a day as needed for Constipation. 60 Capsule 0    ibuprofen (MOTRIN) 800 MG Tab Take 1 Tablet by mouth every 8 hours as needed for Moderate Pain. 30 Tablet 0    oxyCODONE immediate-release (ROXICODONE) 5 MG Tab Take 1 Tablet by mouth every four hours as needed for Severe Pain for up to 10 doses. 10 Tablet 0      Reyna Strong, PharmD

## 2023-02-27 ENCOUNTER — POST PARTUM (OUTPATIENT)
Dept: OBGYN | Facility: CLINIC | Age: 33
End: 2023-02-27
Payer: MEDICAID

## 2023-02-27 VITALS — DIASTOLIC BLOOD PRESSURE: 66 MMHG | BODY MASS INDEX: 28.96 KG/M2 | SYSTOLIC BLOOD PRESSURE: 108 MMHG | WEIGHT: 143.4 LBS

## 2023-02-27 DIAGNOSIS — Z09 SURGERY FOLLOW-UP: ICD-10-CM

## 2023-02-27 DIAGNOSIS — Z90.79 HISTORY OF SALPINGECTOMY: ICD-10-CM

## 2023-02-27 DIAGNOSIS — Z98.891 S/P CESAREAN SECTION: ICD-10-CM

## 2023-02-27 PROCEDURE — 99024 POSTOP FOLLOW-UP VISIT: CPT | Performed by: OBSTETRICS & GYNECOLOGY

## 2023-02-27 NOTE — PROGRESS NOTES
CC: Post-operative check    Date of Surgery: 2023    HPI: Patient is a 32 y.o. year old  who presents for follow up after repeat  section and bilateral tubal ligation. Today she is doing well. She reports minimal pain. She has no concerns for infection. She is both breast and bottle feeding.     ROS:   General: Fever: no  GI: Abdominal pain: no  : Vaginal bleeding: yes    PFSH:  I personally reviewed the past medical and surgical histories.     PHYSICAL EXAMINATION:  Vital Signs:   Vitals:    23 0757   BP: 108/66   Weight: 143 lb 6.4 oz     Appearance/Psychiatric: in no apparent distress and well developed and well nourished  Heart: She does not have edema.  Lungs:Respiratory effort is normal.  Abd: soft, nontender, no rebound or guarding, well healing pfannenstiel incision with subcuticular sutures in place   Pelvic: deferred    EDPS: 0    ASSESSMENT AND PLAN:  32 y.o.  POD #13 s/p repeat  section with bilateral salpingectomy who presentx for a postop check   Diagnoses and all orders for this visit:    Surgery follow-up    S/P  section    History of salpingectomy    Plan:  Patient doing well postoperatively   Counseled to follow up in 4 weeks for postpartum visit. Counseled to continue light activity and pelvic rest before then.  Infection and bleeding precautions reviewed, she'll follow up sooner if a problem arises.     Iris Treadwell M.D.  2023

## 2023-03-28 ENCOUNTER — POST PARTUM (OUTPATIENT)
Dept: OBGYN | Facility: CLINIC | Age: 33
End: 2023-03-28
Payer: MEDICAID

## 2023-03-28 VITALS — WEIGHT: 145 LBS | DIASTOLIC BLOOD PRESSURE: 60 MMHG | BODY MASS INDEX: 29.29 KG/M2 | SYSTOLIC BLOOD PRESSURE: 104 MMHG

## 2023-03-28 PROBLEM — B95.1 POSITIVE GBS TEST: Status: RESOLVED | Noted: 2023-01-26 | Resolved: 2023-03-28

## 2023-03-28 PROBLEM — O34.219 PREVIOUS CESAREAN DELIVERY, ANTEPARTUM: Status: RESOLVED | Noted: 2022-10-12 | Resolved: 2023-03-28

## 2023-03-28 PROBLEM — O09.90 SUPERVISION OF HIGH-RISK PREGNANCY: Status: RESOLVED | Noted: 2022-11-07 | Resolved: 2023-03-28

## 2023-03-28 PROBLEM — Z30.2 REQUEST FOR STERILIZATION: Status: RESOLVED | Noted: 2023-02-07 | Resolved: 2023-03-28

## 2023-03-28 PROBLEM — R73.02 GLUCOSE INTOLERANCE (IMPAIRED GLUCOSE TOLERANCE): Status: RESOLVED | Noted: 2022-12-19 | Resolved: 2023-03-28

## 2023-03-28 PROCEDURE — 0503F POSTPARTUM CARE VISIT: CPT | Performed by: NURSE PRACTITIONER

## 2023-03-28 ASSESSMENT — EDINBURGH POSTNATAL DEPRESSION SCALE (EPDS)
I HAVE BEEN ABLE TO LAUGH AND SEE THE FUNNY SIDE OF THINGS: AS MUCH AS I ALWAYS COULD
I HAVE LOOKED FORWARD WITH ENJOYMENT TO THINGS: AS MUCH AS I EVER DID
I HAVE BEEN SO UNHAPPY THAT I HAVE HAD DIFFICULTY SLEEPING: NOT AT ALL
I HAVE BLAMED MYSELF UNNECESSARILY WHEN THINGS WENT WRONG: NO, NEVER
THINGS HAVE BEEN GETTING ON TOP OF ME: NO, I HAVE BEEN COPING AS WELL AS EVER
I HAVE FELT SCARED OR PANICKY FOR NO GOOD REASON: NO, NOT AT ALL
I HAVE BEEN ANXIOUS OR WORRIED FOR NO GOOD REASON: NO, NOT AT ALL
I HAVE BEEN SO UNHAPPY THAT I HAVE BEEN CRYING: NO, NEVER
TOTAL SCORE: 0
I HAVE FELT SAD OR MISERABLE: NO, NOT AT ALL
THE THOUGHT OF HARMING MYSELF HAS OCCURRED TO ME: NEVER

## 2023-03-28 ASSESSMENT — ENCOUNTER SYMPTOMS
CONSTITUTIONAL NEGATIVE: 1
EYES NEGATIVE: 1
HEARTBURN: 1
NEUROLOGICAL NEGATIVE: 1
MUSCULOSKELETAL NEGATIVE: 1
RESPIRATORY NEGATIVE: 1
PSYCHIATRIC NEGATIVE: 1
CARDIOVASCULAR NEGATIVE: 1

## 2023-03-28 NOTE — PROGRESS NOTES
Pt here today for postpartum exam.  Delivery type: C Section/BTL done 2/14/23  Currently : breast and bottle feeding  Desired BCM: BTL done  LMP:  n/a  Last pap: 11/7/22=negative  Phone # [phone]  EPDS= 0

## 2023-03-28 NOTE — PROGRESS NOTES
Subjective     Sue Fraser is a 32 y.o.  female who presents for her postpartum exam. She had a R C/S with tino salpingectomy without complication. Her prenatal course was complicated by multiple C/S, hx of macrosomia. Eating a regular diet without difficulty. Bowel movement are Normal.  The patient is not having any pain. Vaginal bleeding: has not resumed menses. Patient Denies Incisional pain, drainage or redness.  She is both breast and bottle feeding. Reports no sex prior to this appointment.  Denies any S/S of PP depression.    Assessment   Assessment:    1. PP care of lactating women   2. Exam WNL   3. Pap WNL on 2022  4. Desires contraception   5. EPDS score: 0    Patient Active Problem List    Diagnosis Date Noted    Encounter for postpartum care of lactating mother 2023       Plan   Plan:    1. Breastfeeding support   2. Continue PNV   3. Contraceptive counseling -N/A-s/p BTL  4. Encouraged condom use for STI prevention  5. Discussed diet, exercise and resumption of sexual activity   6. Gave copy of pap   7.  F/u c PCP or [facility] as needed for primary care needs.       HPI    Review of Systems   Constitutional: Negative.    HENT: Negative.     Eyes: Negative.    Respiratory: Negative.     Cardiovascular: Negative.    Gastrointestinal:  Positive for heartburn.   Genitourinary: Negative.    Musculoskeletal: Negative.    Skin: Negative.    Neurological: Negative.    Endo/Heme/Allergies: Negative.    Psychiatric/Behavioral: Negative.              Objective     /60   Wt 145 lb   LMP 2022 (Exact Date)   BMI 29.29 kg/m²      Physical Exam  Constitutional:       Appearance: She is well-developed.   Pulmonary:      Effort: Pulmonary effort is normal.   Abdominal:      Palpations: Abdomen is soft.          Comments: Incision well approximated and healed, no redness, no tenderness   Genitourinary:     Exam position: Supine.      Labia:         Right: No rash,  tenderness, lesion or injury.         Left: No rash, tenderness, lesion or injury.       Vagina: Normal. No signs of injury. No vaginal discharge, erythema, tenderness or bleeding.      Rectum: No tenderness.   Skin:     General: Skin is warm and dry.   Neurological:      Mental Status: She is alert and oriented to person, place, and time.   Psychiatric:         Behavior: Behavior normal.         Thought Content: Thought content normal.         Judgment: Judgment normal.      Comments: EPDS = 0                           Assessment & Plan        1. Encounter for postpartum care of lactating mother

## 2025-03-08 ENCOUNTER — HOSPITAL ENCOUNTER (EMERGENCY)
Facility: MEDICAL CENTER | Age: 35
End: 2025-03-08
Attending: EMERGENCY MEDICINE

## 2025-03-08 ENCOUNTER — APPOINTMENT (OUTPATIENT)
Dept: RADIOLOGY | Facility: MEDICAL CENTER | Age: 35
End: 2025-03-08
Attending: EMERGENCY MEDICINE

## 2025-03-08 VITALS
RESPIRATION RATE: 16 BRPM | HEIGHT: 60 IN | DIASTOLIC BLOOD PRESSURE: 60 MMHG | TEMPERATURE: 97.8 F | OXYGEN SATURATION: 99 % | BODY MASS INDEX: 35.71 KG/M2 | HEART RATE: 68 BPM | WEIGHT: 181.88 LBS | SYSTOLIC BLOOD PRESSURE: 108 MMHG

## 2025-03-08 DIAGNOSIS — K80.50 BILIARY COLIC: ICD-10-CM

## 2025-03-08 LAB
ALBUMIN SERPL BCP-MCNC: 4.4 G/DL (ref 3.2–4.9)
ALBUMIN/GLOB SERPL: 1.3 G/DL
ALP SERPL-CCNC: 124 U/L (ref 30–99)
ALT SERPL-CCNC: 18 U/L (ref 2–50)
ANION GAP SERPL CALC-SCNC: 12 MMOL/L (ref 7–16)
APPEARANCE UR: CLEAR
AST SERPL-CCNC: 19 U/L (ref 12–45)
BASOPHILS # BLD AUTO: 0.4 % (ref 0–1.8)
BASOPHILS # BLD: 0.04 K/UL (ref 0–0.12)
BILIRUB SERPL-MCNC: 0.3 MG/DL (ref 0.1–1.5)
BILIRUB UR QL STRIP.AUTO: NEGATIVE
BUN SERPL-MCNC: 10 MG/DL (ref 8–22)
CALCIUM ALBUM COR SERPL-MCNC: 9 MG/DL (ref 8.5–10.5)
CALCIUM SERPL-MCNC: 9.3 MG/DL (ref 8.5–10.5)
CHLORIDE SERPL-SCNC: 104 MMOL/L (ref 96–112)
CO2 SERPL-SCNC: 20 MMOL/L (ref 20–33)
COLOR UR: YELLOW
CREAT SERPL-MCNC: 0.81 MG/DL (ref 0.5–1.4)
EOSINOPHIL # BLD AUTO: 0.05 K/UL (ref 0–0.51)
EOSINOPHIL NFR BLD: 0.5 % (ref 0–6.9)
ERYTHROCYTE [DISTWIDTH] IN BLOOD BY AUTOMATED COUNT: 42.1 FL (ref 35.9–50)
GFR SERPLBLD CREATININE-BSD FMLA CKD-EPI: 98 ML/MIN/1.73 M 2
GLOBULIN SER CALC-MCNC: 3.3 G/DL (ref 1.9–3.5)
GLUCOSE SERPL-MCNC: 123 MG/DL (ref 65–99)
GLUCOSE UR STRIP.AUTO-MCNC: NEGATIVE MG/DL
HCG SERPL QL: NEGATIVE
HCT VFR BLD AUTO: 40.3 % (ref 37–47)
HGB BLD-MCNC: 12.8 G/DL (ref 12–16)
IMM GRANULOCYTES # BLD AUTO: 0.05 K/UL (ref 0–0.11)
IMM GRANULOCYTES NFR BLD AUTO: 0.5 % (ref 0–0.9)
KETONES UR STRIP.AUTO-MCNC: NEGATIVE MG/DL
LEUKOCYTE ESTERASE UR QL STRIP.AUTO: NEGATIVE
LIPASE SERPL-CCNC: 20 U/L (ref 11–82)
LYMPHOCYTES # BLD AUTO: 1.47 K/UL (ref 1–4.8)
LYMPHOCYTES NFR BLD: 15.3 % (ref 22–41)
MCH RBC QN AUTO: 26 PG (ref 27–33)
MCHC RBC AUTO-ENTMCNC: 31.8 G/DL (ref 32.2–35.5)
MCV RBC AUTO: 81.9 FL (ref 81.4–97.8)
MICRO URNS: NORMAL
MONOCYTES # BLD AUTO: 0.31 K/UL (ref 0–0.85)
MONOCYTES NFR BLD AUTO: 3.2 % (ref 0–13.4)
NEUTROPHILS # BLD AUTO: 7.7 K/UL (ref 1.82–7.42)
NEUTROPHILS NFR BLD: 80.1 % (ref 44–72)
NITRITE UR QL STRIP.AUTO: NEGATIVE
NRBC # BLD AUTO: 0 K/UL
NRBC BLD-RTO: 0 /100 WBC (ref 0–0.2)
PH UR STRIP.AUTO: 5.5 [PH] (ref 5–8)
PLATELET # BLD AUTO: 299 K/UL (ref 164–446)
PMV BLD AUTO: 10.4 FL (ref 9–12.9)
POTASSIUM SERPL-SCNC: 4.7 MMOL/L (ref 3.6–5.5)
PROT SERPL-MCNC: 7.7 G/DL (ref 6–8.2)
PROT UR QL STRIP: NEGATIVE MG/DL
RBC # BLD AUTO: 4.92 M/UL (ref 4.2–5.4)
RBC UR QL AUTO: NEGATIVE
SODIUM SERPL-SCNC: 136 MMOL/L (ref 135–145)
SP GR UR STRIP.AUTO: 1.02
UROBILINOGEN UR STRIP.AUTO-MCNC: 1 EU/DL
WBC # BLD AUTO: 9.6 K/UL (ref 4.8–10.8)

## 2025-03-08 PROCEDURE — 96375 TX/PRO/DX INJ NEW DRUG ADDON: CPT

## 2025-03-08 PROCEDURE — 85025 COMPLETE CBC W/AUTO DIFF WBC: CPT

## 2025-03-08 PROCEDURE — 36415 COLL VENOUS BLD VENIPUNCTURE: CPT

## 2025-03-08 PROCEDURE — 96374 THER/PROPH/DIAG INJ IV PUSH: CPT

## 2025-03-08 PROCEDURE — 80053 COMPREHEN METABOLIC PANEL: CPT

## 2025-03-08 PROCEDURE — 84703 CHORIONIC GONADOTROPIN ASSAY: CPT

## 2025-03-08 PROCEDURE — 700111 HCHG RX REV CODE 636 W/ 250 OVERRIDE (IP): Mod: JZ | Performed by: EMERGENCY MEDICINE

## 2025-03-08 PROCEDURE — 81003 URINALYSIS AUTO W/O SCOPE: CPT

## 2025-03-08 PROCEDURE — 76700 US EXAM ABDOM COMPLETE: CPT

## 2025-03-08 PROCEDURE — 99285 EMERGENCY DEPT VISIT HI MDM: CPT

## 2025-03-08 PROCEDURE — 83690 ASSAY OF LIPASE: CPT

## 2025-03-08 RX ORDER — MORPHINE SULFATE 4 MG/ML
4 INJECTION INTRAVENOUS ONCE
Status: COMPLETED | OUTPATIENT
Start: 2025-03-08 | End: 2025-03-08

## 2025-03-08 RX ORDER — ONDANSETRON 2 MG/ML
4 INJECTION INTRAMUSCULAR; INTRAVENOUS ONCE
Status: COMPLETED | OUTPATIENT
Start: 2025-03-08 | End: 2025-03-08

## 2025-03-08 RX ADMIN — ONDANSETRON 4 MG: 2 INJECTION INTRAMUSCULAR; INTRAVENOUS at 06:56

## 2025-03-08 RX ADMIN — MORPHINE SULFATE 4 MG: 4 INJECTION, SOLUTION INTRAMUSCULAR; INTRAVENOUS at 06:56

## 2025-03-08 NOTE — ED TRIAGE NOTES
"Chief Complaint   Patient presents with    Abdominal Pain     X5 days, worsened today, pain to RUQ. 10/10 burning pain. Pt has h/o gallstones, +gallbladder/appendix. +n/v, states last BM Tuesday/Wednesday.        Pt ambulated with steady gait to triage. Pt A&Ox4, on room air.     Pt to lobby . Pt educated on alerting staff in changes to condition. Pt verbalized understanding. Protocol ordered.     /80   Pulse 100   Temp 36.1 °C (97 °F) (Temporal)   Resp 16   Ht 1.52 m (4' 11.84\")   Wt 82.5 kg (181 lb 14.1 oz)   LMP 02/08/2025   SpO2 100%   BMI 35.71 kg/m²     "

## 2025-03-08 NOTE — ED NOTES
Pt ambulated to YE 64 with a steady gait. C/C is abd pain. Pt is in a gown, on the monitor and call light within reach. Chart up for ERP.

## 2025-03-08 NOTE — ED PROVIDER NOTES
ER Provider Note    Scribed for Dr. Young Mattson M.D. by Chandni Cabrera. 3/8/2025  6:39 AM    Primary Care Provider: Pcp Pt States None    CHIEF COMPLAINT  Chief Complaint   Patient presents with    Abdominal Pain     X5 days, worsened today, pain to RUQ. 10/10 burning pain. Pt has h/o gallstones, +gallbladder/appendix. +n/v, states last BM Tuesday/Wednesday.        EXTERNAL RECORDS REVIEWED  Inpatient Notes History of  2023, salpingectomy at that time.      HPI/ROS    OUTSIDE HISTORIAN(S):  Family present at bedside, provided no history.    Sue Fraser is a 34 y.o. female who presents to the ED for evaluation of abdominal pain onset four days ago. The patient reports her abdominal pain had worsened since onset, which she describes was initially a discomfort. Confirms nausea and vomiting. Denies lower abdominal pain or dysuria. No additional pain or symptoms noted at this time. There are no known alleviating or exacerbating factors.     PAST MEDICAL HISTORY  Past Medical History:   Diagnosis Date    Acute biliary pancreatitis without infection or necrosis 2020    Hypokalemia 2020    Pregnant        SURGICAL HISTORY  Past Surgical History:   Procedure Laterality Date    REPEAT C SECTOIN WITH SALPINGECTOMY N/A 2023    Procedure: REPEAT  SECTION, BILATERAL SALPINGECTOMY;  Surgeon: Amy Mendez D.O.;  Location: SURGERY LABOR AND DELIVERY;  Service: Obstetrics    REPEAT C SECTION  2020    Procedure:  SECTION, REPEAT;  Surgeon: Marylou Ruano D.O.;  Location: LABOR AND DELIVERY;  Service: Obstetrics    REPEAT C SECTION Bilateral 2019    Procedure:  SECTION, REPEAT;  Surgeon: Eulalio Frazier M.D.;  Location: LABOR AND DELIVERY;  Service: Labor and Delivery    PRIMARY C SECTION  2015    Procedure: PRIMARY C SECTION;  Surgeon: Lindsey Stein M.D.;  Location: LABOR AND DELIVERY;  Service:     DILATION AND CURETTAGE  2013     "Performed by Kj Castrejon M.D. at SURGERY [facility]    FOOT SURGERY      right foot surgery age 7       FAMILY HISTORY  Family History   Problem Relation Age of Onset    No Known Problems Mother     Stroke Father     No Known Problems Sister     No Known Problems Brother     No Known Problems Brother        SOCIAL HISTORY   reports that she has never smoked. She has never used smokeless tobacco. She reports that she does not currently use alcohol. She reports that she does not use drugs.    CURRENT MEDICATIONS  Discharge Medication List as of 3/8/2025  9:06 AM        CONTINUE these medications which have NOT CHANGED    Details   acetaminophen (TYLENOL) 500 MG Tab Take 2 Tablets by mouth every 6 hours., Disp-30 Tablet, R-0, Normal      docusate sodium 100 MG Cap Take 1 capsule by mouth 2 times a day as needed for Constipation., Disp-60 Capsule, R-0, Normal      ibuprofen (MOTRIN) 800 MG Tab Take 1 Tablet by mouth every 8 hours as needed for Moderate Pain., Disp-30 Tablet, R-0, Normal      ferrous sulfate 325 (65 Fe) MG tablet Take 1 Tablet by mouth every day., Disp-30 Tablet, R-5, Normal             ALLERGIES  Patient has no known allergies.    PHYSICAL EXAM  /80   Pulse 100   Temp 36.1 °C (97 °F) (Temporal)   Resp 16   Ht 1.52 m (4' 11.84\")   Wt 82.5 kg (181 lb 14.1 oz)   LMP 02/08/2025   SpO2 100%   BMI 35.71 kg/m²   Constitutional: Alert in no apparent distress.  HENT: No signs of trauma, Bilateral external ears normal, Nose normal.   Eyes: Pupils are equal and reactive, Conjunctiva normal, Non-icteric.   Neck: Normal range of motion, No tenderness, Supple,   Cardiovascular: Regular rate and rhythm, no murmurs.   Thorax & Lungs: Normal breath sounds, No respiratory distress, No wheezing, No chest tenderness.   Abdomen: Bowel sounds normal, Soft, right upper quadrant tender to palpation  Skin: Warm, Dry, No erythema, No rash.   Back: No bony tenderness, No CVA tenderness.   Extremities: " No edema, No tenderness, No cyanosis, no tenderness  Psychiatric: Affect normal     DIAGNOSTIC STUDIES & PROCEDURES    Labs:   Labs Reviewed   CBC WITH DIFFERENTIAL - Abnormal; Notable for the following components:       Result Value    MCH 26.0 (*)     MCHC 31.8 (*)     Neutrophils-Polys 80.10 (*)     Lymphocytes 15.30 (*)     Neutrophils (Absolute) 7.70 (*)     All other components within normal limits   COMP METABOLIC PANEL - Abnormal; Notable for the following components:    Glucose 123 (*)     Alkaline Phosphatase 124 (*)     All other components within normal limits   LIPASE   HCG QUAL SERUM   URINALYSIS,CULTURE IF INDICATED   ESTIMATED GFR     All labs reviewed by me.    Radiology:   The attending Emergency Physician has independently interpreted the diagnostic imaging associated with this visit and is awaiting the final reading from the radiologist, which will be displayed below.    Preliminary interpretation is a follows: Gallstones noted  Radiologist interpretation:      US-ABDOMEN COMPLETE SURVEY   Final Result         1. Gallbladder is filled with stones and sludge.   2. Otherwise, negative.              COURSE & MEDICAL DECISION MAKING    INITIAL ASSESSMENT AND PLAN  Care Narrative:       6:39 AM - Patient seen and evaluated at bedside. Patient presents with acute abdominal pain. Discussed plan of care, including obtaining labs and imaging. Patient agrees to plan of care. Patient will be treated with Zofran 4 mg injection and morphine 4 mg injection for her symptoms. Ordered UA, HCG Qual, Lipase, CMP,CBC w/ diff, and US-Abdomen to evaluate.    7:22 AM - I reevaluated the patient at bedside. I discussed the patient's diagnostic study results. I discussed plan for discharge and follow up as outlined below. The patient is stable for discharge at this time and will return for any new or worsening symptoms. Patient verbalizes understanding and support with my plan for discharge.       ADDITIONAL PROBLEM LIST  AND DISPOSITION   Patient with abdominal pain.  No significant LFT abnormalities other than a mild alkaline phosphatase elevation.  No bilirubin elevation.  No significant anemia.  Urinalysis is negative.  Patient is not pregnant.  Ultimately concern for biliary colic given positive ultrasound.  No cholecystitis.  Patient pain-free at this time.  Will discharge home with strict return precautions and follow-up.               DISPOSITION AND DISCUSSIONS  I have discussed management of the patient with the following physicians and CHEIKH's: None    Discussion of management with other [facility] or appropriate source(s): None     Escalation of care considered, and ultimately not performed: acute inpatient care management, however at this time, the patient is most appropriate for outpatient management.    Barriers to care at this time, including but not limited to: Patient does not have established PCP.     Decision tools and prescription drugs considered including, but not limited to: Pain Medications over-the-counter .    The patient will return for new or worsening symptoms and is stable at the time of discharge.    The patient is referred to a primary physician for blood pressure management, diabetic screening, and for all other preventative health concerns.    DISPOSITION:  Patient will be discharged home in stable condition.    FOLLOW UP:  Bird Li M.D.  [address]  [facility] [address]  [facility] [address]  [phone]    In 3 days        FINAL IMPRESSION   1. Biliary colic         Chandni CHAWLA), am scribing for, and in the presence of, Young Mattson M.D..    Electronically signed by: Chnadni Carver), 3/8/2025    Young CHAWLA M.D. personally performed the services described in this documentation, as scribed by Chandni Cabrera in my presence, and it is both accurate and complete.    The note accurately reflects work and decisions made by me.  Young Mattson M.D.  3/8/2025  11:29 AM

## 2025-03-08 NOTE — ED NOTES
Bedside report received from off going RN/tech: Richa Canales, assumed care of patient.  POC discussed with patient. Call light within reach, all needs addressed at this time.       Fall risk interventions in place: Not Applicable (all applicable per [facility] Fall risk assessment)   Continuous monitoring: Pulse Ox or Blood Pressure  IVF/IV medications: Not Applicable   Oxygen: Room Air  Bedside sitter: Not Applicable   Isolation: Not Applicable

## 2025-03-10 ENCOUNTER — APPOINTMENT (OUTPATIENT)
Dept: RADIOLOGY | Facility: MEDICAL CENTER | Age: 35
End: 2025-03-10
Attending: STUDENT IN AN ORGANIZED HEALTH CARE EDUCATION/TRAINING PROGRAM

## 2025-03-10 ENCOUNTER — HOSPITAL ENCOUNTER (EMERGENCY)
Facility: MEDICAL CENTER | Age: 35
End: 2025-03-10
Attending: STUDENT IN AN ORGANIZED HEALTH CARE EDUCATION/TRAINING PROGRAM

## 2025-03-10 VITALS
HEIGHT: 59 IN | WEIGHT: 181.66 LBS | RESPIRATION RATE: 16 BRPM | TEMPERATURE: 97.9 F | DIASTOLIC BLOOD PRESSURE: 55 MMHG | SYSTOLIC BLOOD PRESSURE: 101 MMHG | OXYGEN SATURATION: 92 % | BODY MASS INDEX: 36.62 KG/M2 | HEART RATE: 64 BPM

## 2025-03-10 DIAGNOSIS — K80.50 BILIARY COLIC: ICD-10-CM

## 2025-03-10 LAB
ALBUMIN SERPL BCP-MCNC: 4.5 G/DL (ref 3.2–4.9)
ALBUMIN/GLOB SERPL: 1.4 G/DL
ALP SERPL-CCNC: 124 U/L (ref 30–99)
ALT SERPL-CCNC: 20 U/L (ref 2–50)
ANION GAP SERPL CALC-SCNC: 13 MMOL/L (ref 7–16)
APPEARANCE UR: ABNORMAL
AST SERPL-CCNC: 24 U/L (ref 12–45)
BACTERIA #/AREA URNS HPF: ABNORMAL /HPF
BASOPHILS # BLD AUTO: 0.5 % (ref 0–1.8)
BASOPHILS # BLD: 0.04 K/UL (ref 0–0.12)
BILIRUB SERPL-MCNC: 0.3 MG/DL (ref 0.1–1.5)
BILIRUB UR QL STRIP.AUTO: NEGATIVE
BUN SERPL-MCNC: 13 MG/DL (ref 8–22)
CALCIUM ALBUM COR SERPL-MCNC: 8.7 MG/DL (ref 8.5–10.5)
CALCIUM SERPL-MCNC: 9.1 MG/DL (ref 8.5–10.5)
CASTS URNS QL MICRO: ABNORMAL /LPF (ref 0–2)
CHLORIDE SERPL-SCNC: 105 MMOL/L (ref 96–112)
CO2 SERPL-SCNC: 19 MMOL/L (ref 20–33)
COLOR UR: YELLOW
CREAT SERPL-MCNC: 0.8 MG/DL (ref 0.5–1.4)
EOSINOPHIL # BLD AUTO: 0.12 K/UL (ref 0–0.51)
EOSINOPHIL NFR BLD: 1.5 % (ref 0–6.9)
EPITHELIAL CELLS 1715: >20 /HPF (ref 0–5)
ERYTHROCYTE [DISTWIDTH] IN BLOOD BY AUTOMATED COUNT: 42 FL (ref 35.9–50)
GFR SERPLBLD CREATININE-BSD FMLA CKD-EPI: 99 ML/MIN/1.73 M 2
GLOBULIN SER CALC-MCNC: 3.3 G/DL (ref 1.9–3.5)
GLUCOSE SERPL-MCNC: 120 MG/DL (ref 65–99)
GLUCOSE UR STRIP.AUTO-MCNC: NEGATIVE MG/DL
HCT VFR BLD AUTO: 41.2 % (ref 37–47)
HGB BLD-MCNC: 13.6 G/DL (ref 12–16)
IMM GRANULOCYTES # BLD AUTO: 0.03 K/UL (ref 0–0.11)
IMM GRANULOCYTES NFR BLD AUTO: 0.4 % (ref 0–0.9)
KETONES UR STRIP.AUTO-MCNC: NEGATIVE MG/DL
LEUKOCYTE ESTERASE UR QL STRIP.AUTO: ABNORMAL
LIPASE SERPL-CCNC: 22 U/L (ref 11–82)
LYMPHOCYTES # BLD AUTO: 2.73 K/UL (ref 1–4.8)
LYMPHOCYTES NFR BLD: 35 % (ref 22–41)
MCH RBC QN AUTO: 26.6 PG (ref 27–33)
MCHC RBC AUTO-ENTMCNC: 33 G/DL (ref 32.2–35.5)
MCV RBC AUTO: 80.5 FL (ref 81.4–97.8)
MICRO URNS: ABNORMAL
MONOCYTES # BLD AUTO: 0.31 K/UL (ref 0–0.85)
MONOCYTES NFR BLD AUTO: 4 % (ref 0–13.4)
NEUTROPHILS # BLD AUTO: 4.58 K/UL (ref 1.82–7.42)
NEUTROPHILS NFR BLD: 58.6 % (ref 44–72)
NITRITE UR QL STRIP.AUTO: NEGATIVE
NRBC # BLD AUTO: 0 K/UL
NRBC BLD-RTO: 0 /100 WBC (ref 0–0.2)
PH UR STRIP.AUTO: 5.5 [PH] (ref 5–8)
PLATELET # BLD AUTO: 322 K/UL (ref 164–446)
PMV BLD AUTO: 11.2 FL (ref 9–12.9)
POTASSIUM SERPL-SCNC: 4 MMOL/L (ref 3.6–5.5)
PROT SERPL-MCNC: 7.8 G/DL (ref 6–8.2)
PROT UR QL STRIP: NEGATIVE MG/DL
RBC # BLD AUTO: 5.12 M/UL (ref 4.2–5.4)
RBC # URNS HPF: ABNORMAL /HPF (ref 0–2)
RBC UR QL AUTO: NEGATIVE
SODIUM SERPL-SCNC: 137 MMOL/L (ref 135–145)
SP GR UR STRIP.AUTO: 1.03
UROBILINOGEN UR STRIP.AUTO-MCNC: 1 EU/DL
WBC # BLD AUTO: 7.8 K/UL (ref 4.8–10.8)
WBC #/AREA URNS HPF: ABNORMAL /HPF
YEAST BUDDING URNS QL: PRESENT /HPF

## 2025-03-10 PROCEDURE — 36415 COLL VENOUS BLD VENIPUNCTURE: CPT

## 2025-03-10 PROCEDURE — 80053 COMPREHEN METABOLIC PANEL: CPT

## 2025-03-10 PROCEDURE — 96374 THER/PROPH/DIAG INJ IV PUSH: CPT

## 2025-03-10 PROCEDURE — 85025 COMPLETE CBC W/AUTO DIFF WBC: CPT

## 2025-03-10 PROCEDURE — 96376 TX/PRO/DX INJ SAME DRUG ADON: CPT

## 2025-03-10 PROCEDURE — 700105 HCHG RX REV CODE 258: Performed by: STUDENT IN AN ORGANIZED HEALTH CARE EDUCATION/TRAINING PROGRAM

## 2025-03-10 PROCEDURE — 76705 ECHO EXAM OF ABDOMEN: CPT

## 2025-03-10 PROCEDURE — 700111 HCHG RX REV CODE 636 W/ 250 OVERRIDE (IP): Mod: JZ | Performed by: STUDENT IN AN ORGANIZED HEALTH CARE EDUCATION/TRAINING PROGRAM

## 2025-03-10 PROCEDURE — 700102 HCHG RX REV CODE 250 W/ 637 OVERRIDE(OP): Performed by: STUDENT IN AN ORGANIZED HEALTH CARE EDUCATION/TRAINING PROGRAM

## 2025-03-10 PROCEDURE — A9270 NON-COVERED ITEM OR SERVICE: HCPCS | Performed by: STUDENT IN AN ORGANIZED HEALTH CARE EDUCATION/TRAINING PROGRAM

## 2025-03-10 PROCEDURE — 83690 ASSAY OF LIPASE: CPT

## 2025-03-10 PROCEDURE — 99285 EMERGENCY DEPT VISIT HI MDM: CPT

## 2025-03-10 PROCEDURE — 96375 TX/PRO/DX INJ NEW DRUG ADDON: CPT

## 2025-03-10 PROCEDURE — 81001 URINALYSIS AUTO W/SCOPE: CPT

## 2025-03-10 RX ORDER — MORPHINE SULFATE 4 MG/ML
4 INJECTION INTRAVENOUS ONCE
Status: COMPLETED | OUTPATIENT
Start: 2025-03-10 | End: 2025-03-10

## 2025-03-10 RX ORDER — SODIUM CHLORIDE 9 MG/ML
1000 INJECTION, SOLUTION INTRAVENOUS ONCE
Status: COMPLETED | OUTPATIENT
Start: 2025-03-10 | End: 2025-03-10

## 2025-03-10 RX ORDER — DICYCLOMINE HCL 20 MG
20 TABLET ORAL EVERY 6 HOURS
Qty: 120 TABLET | Refills: 0 | Status: SHIPPED | OUTPATIENT
Start: 2025-03-10

## 2025-03-10 RX ORDER — HALOPERIDOL 5 MG/ML
2.5 INJECTION INTRAMUSCULAR ONCE
Status: COMPLETED | OUTPATIENT
Start: 2025-03-10 | End: 2025-03-10

## 2025-03-10 RX ORDER — ALUMINA, MAGNESIA, AND SIMETHICONE 2400; 2400; 240 MG/30ML; MG/30ML; MG/30ML
10 SUSPENSION ORAL 4 TIMES DAILY PRN
Qty: 560 ML | Refills: 0 | Status: SHIPPED | OUTPATIENT
Start: 2025-03-10

## 2025-03-10 RX ORDER — ONDANSETRON 4 MG/1
4 TABLET, ORALLY DISINTEGRATING ORAL EVERY 6 HOURS PRN
Qty: 10 TABLET | Refills: 0 | Status: SHIPPED | OUTPATIENT
Start: 2025-03-10

## 2025-03-10 RX ORDER — ONDANSETRON 2 MG/ML
4 INJECTION INTRAMUSCULAR; INTRAVENOUS ONCE
Status: COMPLETED | OUTPATIENT
Start: 2025-03-10 | End: 2025-03-10

## 2025-03-10 RX ORDER — DICYCLOMINE HCL 20 MG
20 TABLET ORAL ONCE
Status: COMPLETED | OUTPATIENT
Start: 2025-03-10 | End: 2025-03-10

## 2025-03-10 RX ADMIN — ONDANSETRON 4 MG: 2 INJECTION INTRAMUSCULAR; INTRAVENOUS at 03:08

## 2025-03-10 RX ADMIN — SODIUM CHLORIDE 1000 ML: 9 INJECTION, SOLUTION INTRAVENOUS at 03:08

## 2025-03-10 RX ADMIN — HALOPERIDOL LACTATE 2.5 MG: 5 INJECTION, SOLUTION INTRAMUSCULAR at 04:47

## 2025-03-10 RX ADMIN — DICYCLOMINE HYDROCHLORIDE 20 MG: 20 TABLET ORAL at 03:08

## 2025-03-10 RX ADMIN — MORPHINE SULFATE 4 MG: 4 INJECTION, SOLUTION INTRAMUSCULAR; INTRAVENOUS at 03:08

## 2025-03-10 RX ADMIN — MORPHINE SULFATE 4 MG: 4 INJECTION, SOLUTION INTRAMUSCULAR; INTRAVENOUS at 04:47

## 2025-03-10 ASSESSMENT — FIBROSIS 4 INDEX: FIB4 SCORE: 0.49

## 2025-03-10 NOTE — DISCHARGE INSTRUCTIONS
Please use the medications as needed for your symptoms.  Please follow the diet for gallbladder disease.  If you have fevers please return for reevaluation with this pain.

## 2025-03-10 NOTE — ED PROVIDER NOTES
ER Provider Note    Scribed for Soledad Jacques D.O. by Nicolette Cespedes. 3/10/2025  2:58 AM    Primary Care Provider: Pcp Pt States None    CHIEF COMPLAINT   Chief Complaint   Patient presents with    RUQ Pain     Reports RUQ pain started yesterday all day on and off, worst at 1 am today.  Patient took ibuprofen 250 mg with no relief. Here last Saturday for gallstones. -[place]      EXTERNAL RECORDS REVIEWED  The patient was seen here 2 days ago for the same complaint. Imaging showed that the patient had a gallbladder filled with stones.     HPI/ROS  LIMITATION TO HISTORY   Select: : None  OUTSIDE HISTORIAN(S):  None.    Sue Fraser is a 34 y.o. female who presents to the ED for evaluation of right upper quadrant onset about one week ago. She describes that she was seen here 2 days ago for the same complaint. Since then, her patient has been on and off, but she notes it worsened at 1 AM today. She reports that she has taken ibuprofen 250 mg with no relief. The patient denies any fevers, chills, nausea, vomiting.      PAST MEDICAL HISTORY  Past Medical History:   Diagnosis Date    Acute biliary pancreatitis without infection or necrosis 2020    Hypokalemia 2020    Pregnant        SURGICAL HISTORY  Past Surgical History:   Procedure Laterality Date    REPEAT C SECTOIN WITH SALPINGECTOMY N/A 2023    Procedure: REPEAT  SECTION, BILATERAL SALPINGECTOMY;  Surgeon: Amy Mendez D.O.;  Location: SURGERY LABOR AND DELIVERY;  Service: Obstetrics    REPEAT C SECTION  2020    Procedure:  SECTION, REPEAT;  Surgeon: Marylou Ruano D.O.;  Location: LABOR AND DELIVERY;  Service: Obstetrics    REPEAT C SECTION Bilateral 2019    Procedure:  SECTION, REPEAT;  Surgeon: Eulalio Frazier M.D.;  Location: LABOR AND DELIVERY;  Service: Labor and Delivery    PRIMARY C SECTION  2015    Procedure: PRIMARY C SECTION;  Surgeon: Lindsey Stein M.D.;   "Location: LABOR AND DELIVERY;  Service:     DILATION AND CURETTAGE  03/14/2013    Performed by Kj Castrejon M.D. at SURGERY [facility]    FOOT SURGERY      right foot surgery age 7       FAMILY HISTORY  Family History   Problem Relation Age of Onset    No Known Problems Mother     Stroke Father     No Known Problems Sister     No Known Problems Brother     No Known Problems Brother        SOCIAL HISTORY   reports that she has never smoked. She has never used smokeless tobacco. She reports that she does not currently use alcohol. She reports that she does not use drugs.      CURRENT MEDICATIONS  Discharge Medication List as of 3/10/2025  5:31 AM        CONTINUE these medications which have NOT CHANGED    Details   acetaminophen (TYLENOL) 500 MG Tab Take 2 Tablets by mouth every 6 hours., Disp-30 Tablet, R-0, Normal      docusate sodium 100 MG Cap Take 1 capsule by mouth 2 times a day as needed for Constipation., Disp-60 Capsule, R-0, Normal      ibuprofen (MOTRIN) 800 MG Tab Take 1 Tablet by mouth every 8 hours as needed for Moderate Pain., Disp-30 Tablet, R-0, Normal      ferrous sulfate 325 (65 Fe) MG tablet Take 1 Tablet by mouth every day., Disp-30 Tablet, R-5, Normal             ALLERGIES  Patient has no known allergies.    PHYSICAL EXAM  /76   Pulse 78   Temp 36.5 °C (97.7 °F) (Temporal)   Resp 16   Ht 1.499 m (4' 11\")   Wt 82.4 kg (181 lb 10.5 oz)   LMP 02/08/2025   SpO2 98%   BMI 36.69 kg/m²     Pulse oximetry interpretation: I interpret the pulse oximetry as normal.  Constitutional: Awake and alert. No acute distress.  Head: NCAT.  HEENT: Normal Conjunctiva.  Neck: Grossly normal range of motion. Airway midline.  Cardiovascular: Normal heart rate, Normal rhythm.  Thorax & Lungs: No respiratory distress. Clear to Auscultation bilaterally.  Abdomen: Normal inspection. Epigastric and right upper quadrant tenderness. Nondistended  Skin: No obvious rash.  Back: No tenderness, No CVA " tenderness.   Musculoskeletal: No obvious deformity. Moves all extremities Well.  Neurologic: A&Ox4.   Psychiatric: Mood and affect are appropriate for situation.     DIAGNOSTIC STUDIES    LABS  Results for orders placed or performed during the hospital encounter of 03/10/25   CBC WITH DIFFERENTIAL    Collection Time: 03/10/25  2:54 AM   Result Value Ref Range    WBC 7.8 4.8 - 10.8 K/uL    RBC 5.12 4.20 - 5.40 M/uL    Hemoglobin 13.6 12.0 - 16.0 g/dL    Hematocrit 41.2 37.0 - 47.0 %    MCV 80.5 (L) 81.4 - 97.8 fL    MCH 26.6 (L) 27.0 - 33.0 pg    MCHC 33.0 32.2 - 35.5 g/dL    RDW 42.0 35.9 - 50.0 fL    Platelet Count 322 164 - 446 K/uL    MPV 11.2 9.0 - 12.9 fL    Neutrophils-Polys 58.60 44.00 - 72.00 %    Lymphocytes 35.00 22.00 - 41.00 %    Monocytes 4.00 0.00 - 13.40 %    Eosinophils 1.50 0.00 - 6.90 %    Basophils 0.50 0.00 - 1.80 %    Immature Granulocytes 0.40 0.00 - 0.90 %    Nucleated RBC 0.00 0.00 - 0.20 /100 WBC    Neutrophils (Absolute) 4.58 1.82 - 7.42 K/uL    Lymphs (Absolute) 2.73 1.00 - 4.80 K/uL    Monos (Absolute) 0.31 0.00 - 0.85 K/uL    Eos (Absolute) 0.12 0.00 - 0.51 K/uL    Baso (Absolute) 0.04 0.00 - 0.12 K/uL    Immature Granulocytes (abs) 0.03 0.00 - 0.11 K/uL    NRBC (Absolute) 0.00 K/uL   COMP METABOLIC PANEL    Collection Time: 03/10/25  2:54 AM   Result Value Ref Range    Sodium 137 135 - 145 mmol/L    Potassium 4.0 3.6 - 5.5 mmol/L    Chloride 105 96 - 112 mmol/L    Co2 19 (L) 20 - 33 mmol/L    Anion Gap 13.0 7.0 - 16.0    Glucose 120 (H) 65 - 99 mg/dL    Bun 13 8 - 22 mg/dL    Creatinine 0.80 0.50 - 1.40 mg/dL    Calcium 9.1 8.5 - 10.5 mg/dL    Correct Calcium 8.7 8.5 - 10.5 mg/dL    AST(SGOT) 24 12 - 45 U/L    ALT(SGPT) 20 2 - 50 U/L    Alkaline Phosphatase 124 (H) 30 - 99 U/L    Total Bilirubin 0.3 0.1 - 1.5 mg/dL    Albumin 4.5 3.2 - 4.9 g/dL    Total Protein 7.8 6.0 - 8.2 g/dL    Globulin 3.3 1.9 - 3.5 g/dL    A-G Ratio 1.4 g/dL   LIPASE    Collection Time: 03/10/25  2:54 AM    Result Value Ref Range    Lipase 22 11 - 82 U/L   ESTIMATED GFR    Collection Time: 03/10/25  2:54 AM   Result Value Ref Range    GFR (CKD-EPI) 99 >60 mL/min/1.73 m 2   URINALYSIS,CULTURE IF INDICATED    Collection Time: 03/10/25  3:33 AM    Specimen: Urine   Result Value Ref Range    Color Yellow     Character Cloudy (A)     Specific Gravity 1.028 <1.035    Ph 5.5 5.0 - 8.0    Glucose Negative Negative mg/dL    Ketones Negative Negative mg/dL    Protein Negative Negative mg/dL    Bilirubin Negative Negative    Urobilinogen, Urine 1.0 <=1.0 EU/dL    Nitrite Negative Negative    Leukocyte Esterase Small (A) Negative    Occult Blood Negative Negative    Micro Urine Req Microscopic    URINE MICROSCOPIC (W/UA)    Collection Time: 03/10/25  3:33 AM   Result Value Ref Range    WBC 11-20 (A) /hpf    RBC 0-2 0 - 2 /hpf    Bacteria Many (A) None /hpf    Epithelial Cells >20 (A) 0 - 5 /hpf    Urine Casts 0-2 0 - 2 /lpf    Budding Yeast Present (A) Absent /hpf        RADIOLOGY/PROCEDURES   The attending emergency physician has independently interpreted the diagnostic imaging associated with this visit and am waiting the final reading from the radiologist.   My preliminary interpretation is a follows: cholelithiasis w/o cholecystitis    Radiologist interpretation:  US-RUQ   Final Result         1.  Hepatomegaly   2.  Cholelithiasis and gallbladder sludge without additional sonographic findings of acute cholecystitis.   3.  Mildly atrophic right kidney          COURSE & MEDICAL DECISION MAKING     ASSESSMENT, COURSE AND PLAN  Care Narrative:   34-year-old female recent presentation for biliary colic presents with recurrent symptoms without fever  Afebrile normal vitals  On exam epigastric and right upper quadrant tenderness.  No Rose sign.  Differential includes biliary colic, cholecystitis, PUD, GERD  Treated with IV morphine fluids and IV Zofran.  No leukocytosis, mild evidence of dehydration, no LFT elevations.  Bilirubin  normal.  Ultrasound shows cholelithiasis without cholecystitis.  She received additional pain medicine, Haldol, morphine.  On reassessment after these interventions she is having no pain.  She is p.o. tolerant.  She was previously discharged without medications for her symptoms.  Will discharge with medications for biliary colic.    Hydration: Based on the patient's presentation of Dehydration the patient was given IV fluids. IV Hydration was used because oral hydration was not as rapid as required. Upon recheck following hydration, the patient was improved.    ADDITIONAL PROBLEM LIST    dehydration    DISPOSITION AND DISCUSSIONS  I have discussed management of the patient with the following physicians and CHEIKH's:  none    Discussion of management with other [facility] or appropriate source(s): None     Escalation of care considered, and ultimately not performed: IV fluids, Laboratory analysis, diagnostic imaging, and acute inpatient care management, however at this time, the patient is most appropriate for outpatient management.    Barriers to care at this time, including but not limited to: Patient does not have established PCP.     Decision tools and prescription drugs considered including, but not limited to:  None .    FINAL DIAGNOSIS  1. Biliary colic       Nicolette CHAWLA (Julia), am scribing for, and in the presence of, Soledad Jacques D.O..    Electronically signed by: Nicolette Cespedes (Julia), 3/10/2025    Soledad CHAWLA D.O. personally performed the services described in this documentation, as scribed by Nicolette Cespedes in my presence, and it is both accurate and complete.      The note accurately reflects work and decisions made by me.  Soledad Jacques D.O.  3/10/2025  5:46 AM

## 2025-03-10 NOTE — ED TRIAGE NOTES
"Chief Complaint   Patient presents with    RUQ Pain     Reports RUQ pain started yesterday all day on and off, worst at 1 am today.  Patient took ibuprofen 250 mg with no relief. Here last Saturday for gallstones. -[place]      BP (!) 138/94   Pulse 86   Temp 36.5 °C (97.7 °F) (Temporal)   Resp 14   Ht 1.499 m (4' 11\")   Wt 82.4 kg (181 lb 10.5 oz)   LMP 02/08/2025   SpO2 99%   BMI 36.69 kg/m²     Ambulatory:  Yes  Alert and Oriented: x 4  Pt is speaking in full sentences, follows commands and responds appropriately to questions.     Respirations are even and unlabored.  "

## 2025-03-10 NOTE — ED NOTES
PT up to bathroom independently with steady gait and provided urine sample. UA collected and sent to lab.

## 2025-03-14 NOTE — Clinical Note
REFERRAL APPROVAL NOTICE         Sent on March 13, 2025                   Sue Fraser  [address]  [place] [address]                   Dear Ms. Hal Fraser,    After a careful review of the medical information and benefit coverage, [facility] has processed your referral. See below for additional details.    If applicable, you must be actively enrolled with your insurance for coverage of the authorized service. If you have any questions regarding your coverage, please contact your insurance directly.    REFERRAL INFORMATION   Referral #:  [id number]  Referred-To Department    Referred-By Provider:  Surgery    Young Mattson M.D.   Department Of Surgery      [address] [facility]  [address]  [facility] [address]  [phone] [address], [address]  [place] [id number] [phone]    Referral Start Date:  03/08/2025  Referral End Date:   03/08/2026             SCHEDULING  If you do not already have an appointment, please call [phone] to make an appointment.     MORE INFORMATION  If you do not already have a [employer] account, sign up at: [email or web address]  You can access your medical information, make appointments, see lab results, billing information, and more.  If you have questions regarding this referral, please contact  the [facility] Referrals department at:             [phone]. Monday - Friday 8:00AM - 5:00PM.     Sincerely,    [facility]

## (undated) DEVICE — DETERGENT RENUZYME PLUS 10 OZ PACKET (50/BX)

## (undated) DEVICE — CLOSURE SKIN STRIP 1/2 X 4 IN - (STERI STRIP) (50/BX 4BX/CA)

## (undated) DEVICE — LACTATED RINGERS INJ 1000 ML - (14EA/CA 60CA/PF)

## (undated) DEVICE — TRAY FOLEY CATHETER STATLOCK 16FR SURESTEP  (10EA/CA)

## (undated) DEVICE — SUTURE 3-0 VICRYL PLUS CT-1 - 36 INCH (36/BX)

## (undated) DEVICE — HEAD HOLDER JUNIOR/ADULT

## (undated) DEVICE — ELECTRODE DUAL RETURN W/ CORD - (50/PK)

## (undated) DEVICE — BLANKET UNDERBODY FULL ACCES - (5/CA)

## (undated) DEVICE — TELFA 8 X 10 BIOSEAL - (50EA/CA)

## (undated) DEVICE — WATER IRRIG. STER. 1500 ML - (9/CA)

## (undated) DEVICE — PACK C-SECTION (2EA/CA)

## (undated) DEVICE — TUBE CONNECT SUCTION CLEAR 120 X 1/4" (50EA/CA)"

## (undated) DEVICE — TRAY SPINAL ANESTHESIA NON-SAFETY (10/CA)

## (undated) DEVICE — DRESSING NON ADHERENT 3 X 4 - STERILE (100/BX 12BX/CA)

## (undated) DEVICE — TUBING CLEARLINK DUO-VENT - C-FLO (48EA/CA)

## (undated) DEVICE — PAD LAP STERILE 18 X 18 - (5/PK 40PK/CA)

## (undated) DEVICE — DRESSING INTERCEED ABSORBABLE ADHESION BARRIER TC7 (10EA/CA)

## (undated) DEVICE — LIGASURE SM JAW SEALER CRVD - (6EA/CA)

## (undated) DEVICE — DRESSING ABDOMINAL PAD STERILE 8 X 10" (360EA/CA)"

## (undated) DEVICE — WRAP PROBE OXIMETER INFANT UNIVERSAL DESIGN TO FIT NEONATAL FOOT INFANT TOE OR PED FINGER  (12EA/BX)

## (undated) DEVICE — CATHETER IV NON-SAFETY 18 GA X 1 1/4 (50/BX 4BX/CA)

## (undated) DEVICE — CHLORAPREP 26 ML APPLICATOR - ORANGE TINT(25/CA)

## (undated) DEVICE — WATER IRRIGATION STERILE 1000ML (12EA/CA)

## (undated) DEVICE — CHLORAPREP 3 ML APPLICATOR - (25/BX 4BX/CS 100/CS)

## (undated) DEVICE — CANISTER SUCTION 3000ML MECHANICAL FILTER AUTO SHUTOFF MEDI-VAC NONSTERILE LF DISP  (40EA/CA)

## (undated) DEVICE — GLOVE BIOGEL PI ORTHO SZ 6 SURGICAL PF LF (40PR/BX)

## (undated) DEVICE — KIT  I.V. START (100EA/CA)

## (undated) DEVICE — SUTURE 2-0 CHROMIC GUT CT-1 27 (36PK/BX)"

## (undated) DEVICE — BLANKET STERILE CHICKIE FOR L&D (100/CA)

## (undated) DEVICE — SUTURE ABSORBABLE MONOFILAMENT VIOLET MONOCRYL CT-1 L36 IN (36EA/BX)

## (undated) DEVICE — SUTURE 0 VICRYL PLUS CT-1 - 36 INCH (36/BX)

## (undated) DEVICE — ELECTRODE RADIOLUCENT LF 3PK - RED DOT (3/PK 200PK/CA)

## (undated) DEVICE — SUTURE 0 VICRYL PLUS CT 36 (36PK/BX)"

## (undated) DEVICE — SWABSTICK BENZOIN SINGLE (50EA/BX)

## (undated) DEVICE — SODIUM CHL IRRIGATION 0.9% 1000ML (12EA/CA)

## (undated) DEVICE — SOLUTION PLASMA-LYTE PH 7.4 INJ 1000ML  (14EA/CA)

## (undated) DEVICE — RETRACTOR O C SECTION LRY - (5/BX)

## (undated) DEVICE — PACK ROOM TURNOVER L&D (12/CA)

## (undated) DEVICE — CANNULA O2 COMFORT SOFT EAR ADULT 7 FT TUBING (50/CA)

## (undated) DEVICE — BAG SPONGE COUNT 10.25 X 32 - BLUE (250/CA)

## (undated) DEVICE — GLOVE, BIOGEL ECLIPSE, SZ 7.0, PF LTX (50/BX)

## (undated) DEVICE — PAD GROUNDING PRE-JELLED - (50EA/PK)

## (undated) DEVICE — SUTURE 4-0 27IN VCRL PLUS ANTI (36PK/BX)

## (undated) DEVICE — SET EXTENSION WITH 2 PORTS (48EA/CA) ***PART #2C8610 IS A SUBSTITUTE*****

## (undated) DEVICE — SLEEVE, SEQUENTIAL CALF REG

## (undated) DEVICE — APPLICATOR SURGIFLO - (6EA/CA)

## (undated) DEVICE — SUTURE 1 CHROMIC CTX ETHICON - (36PK/BX)

## (undated) DEVICE — DRESSING POST OP BORDER 4 X 10 (5EA/BX)

## (undated) DEVICE — STAPLER SKIN DISP - (6/BX 10BX/CA) VISISTAT

## (undated) DEVICE — TAPE CLOTH MEDIPORE 6 INCH - (12RL/CA)

## (undated) DEVICE — SUCTION INSTRUMENT YANKAUER BULBOUS TIP W/O VENT (50EA/CA)

## (undated) DEVICE — GLOVE BIOGEL SZ 8 SURGICAL PF LTX - (50PR/BX 4BX/CA)

## (undated) DEVICE — SENSOR SPO2 ADULT LNCS ADTX (20/BX) ORDER ITEM #19593

## (undated) DEVICE — SENSOR SPO2 NEO LNCS ADHESIVE (20/BX) SEE USER NOTES

## (undated) DEVICE — SPONGE GAUZESTER 4 X 4 4PLY - (128PK/CA)

## (undated) DEVICE — PENCIL ELECTSURG 10FT HLSTR - WITH BLADE (50EA/CA)